# Patient Record
Sex: MALE | Race: WHITE | Employment: FULL TIME | URBAN - NONMETROPOLITAN AREA
[De-identification: names, ages, dates, MRNs, and addresses within clinical notes are randomized per-mention and may not be internally consistent; named-entity substitution may affect disease eponyms.]

---

## 2018-11-05 ENCOUNTER — HOSPITAL ENCOUNTER (OUTPATIENT)
Dept: GENERAL RADIOLOGY | Age: 58
Discharge: HOME | End: 2018-11-05
Payer: COMMERCIAL

## 2018-11-05 ENCOUNTER — HOSPITAL ENCOUNTER (OUTPATIENT)
Age: 58
Discharge: HOME | End: 2018-11-05
Payer: COMMERCIAL

## 2018-11-05 DIAGNOSIS — M79.671 RIGHT FOOT PAIN: ICD-10-CM

## 2018-11-05 PROCEDURE — 73630 X-RAY EXAM OF FOOT: CPT

## 2020-02-18 ENCOUNTER — HOSPITAL ENCOUNTER (OUTPATIENT)
Age: 60
Discharge: HOME | End: 2020-02-18
Payer: COMMERCIAL

## 2020-02-18 ENCOUNTER — NURSE ONLY (OUTPATIENT)
Dept: LAB | Age: 60
End: 2020-02-18

## 2020-02-18 ENCOUNTER — HOSPITAL ENCOUNTER (OUTPATIENT)
Dept: GENERAL RADIOLOGY | Age: 60
Discharge: HOME | End: 2020-02-18
Payer: COMMERCIAL

## 2020-02-18 ENCOUNTER — OFFICE VISIT (OUTPATIENT)
Dept: RHEUMATOLOGY | Age: 60
End: 2020-02-18
Payer: COMMERCIAL

## 2020-02-18 VITALS
OXYGEN SATURATION: 93 % | HEART RATE: 93 BPM | DIASTOLIC BLOOD PRESSURE: 72 MMHG | SYSTOLIC BLOOD PRESSURE: 130 MMHG | HEIGHT: 67 IN | WEIGHT: 205.4 LBS | BODY MASS INDEX: 32.24 KG/M2

## 2020-02-18 LAB
ALBUMIN SERPL-MCNC: 4 G/DL (ref 3.5–5.1)
ALP BLD-CCNC: 85 U/L (ref 38–126)
ALT SERPL-CCNC: 30 U/L (ref 11–66)
ANION GAP SERPL CALCULATED.3IONS-SCNC: 14 MEQ/L (ref 8–16)
AST SERPL-CCNC: 25 U/L (ref 5–40)
BASOPHILS # BLD: 0.7 %
BASOPHILS ABSOLUTE: 0.1 THOU/MM3 (ref 0–0.1)
BILIRUB SERPL-MCNC: 0.2 MG/DL (ref 0.3–1.2)
BUN BLDV-MCNC: 17 MG/DL (ref 7–22)
C-REACTIVE PROTEIN: 0.3 MG/DL (ref 0–1)
CALCIUM SERPL-MCNC: 9 MG/DL (ref 8.5–10.5)
CHLORIDE BLD-SCNC: 103 MEQ/L (ref 98–111)
CO2: 25 MEQ/L (ref 23–33)
CREAT SERPL-MCNC: 1.2 MG/DL (ref 0.4–1.2)
EOSINOPHIL # BLD: 2.9 %
EOSINOPHILS ABSOLUTE: 0.2 THOU/MM3 (ref 0–0.4)
ERYTHROCYTE [DISTWIDTH] IN BLOOD BY AUTOMATED COUNT: 12.7 % (ref 11.5–14.5)
ERYTHROCYTE [DISTWIDTH] IN BLOOD BY AUTOMATED COUNT: 42.9 FL (ref 35–45)
FERRITIN: 137 NG/ML (ref 22–322)
GFR SERPL CREATININE-BSD FRML MDRD: 62 ML/MIN/1.73M2
GLUCOSE BLD-MCNC: 132 MG/DL (ref 70–108)
HCT VFR BLD CALC: 44.3 % (ref 42–52)
HEMOGLOBIN: 14.6 GM/DL (ref 14–18)
IMMATURE GRANS (ABS): 0.03 THOU/MM3 (ref 0–0.07)
IMMATURE GRANULOCYTES: 0.4 %
IRON SATURATION: 22 % (ref 20–50)
IRON: 55 UG/DL (ref 65–195)
LYMPHOCYTES # BLD: 23.5 %
LYMPHOCYTES ABSOLUTE: 1.7 THOU/MM3 (ref 1–4.8)
MCH RBC QN AUTO: 30.5 PG (ref 26–33)
MCHC RBC AUTO-ENTMCNC: 33 GM/DL (ref 32.2–35.5)
MCV RBC AUTO: 92.5 FL (ref 80–94)
MONOCYTES # BLD: 6.9 %
MONOCYTES ABSOLUTE: 0.5 THOU/MM3 (ref 0.4–1.3)
NUCLEATED RED BLOOD CELLS: 0 /100 WBC
PLATELET # BLD: 348 THOU/MM3 (ref 130–400)
PMV BLD AUTO: 10.2 FL (ref 9.4–12.4)
POTASSIUM SERPL-SCNC: 3.8 MEQ/L (ref 3.5–5.2)
RBC # BLD: 4.79 MILL/MM3 (ref 4.7–6.1)
RHEUMATOID FACTOR: < 10 IU/ML (ref 0–13)
SEDIMENTATION RATE, ERYTHROCYTE: 5 MM/HR (ref 0–10)
SEG NEUTROPHILS: 65.6 %
SEGMENTED NEUTROPHILS ABSOLUTE COUNT: 4.7 THOU/MM3 (ref 1.8–7.7)
SODIUM BLD-SCNC: 142 MEQ/L (ref 135–145)
TOTAL CK: 341 U/L (ref 55–170)
TOTAL IRON BINDING CAPACITY: 253 UG/DL (ref 171–450)
TOTAL PROTEIN: 7 G/DL (ref 6.1–8)
URIC ACID: 6.3 MG/DL (ref 3.7–7)
WBC # BLD: 7.2 THOU/MM3 (ref 4.8–10.8)

## 2020-02-18 PROCEDURE — 73030 X-RAY EXAM OF SHOULDER: CPT

## 2020-02-18 PROCEDURE — 99244 OFF/OP CNSLTJ NEW/EST MOD 40: CPT | Performed by: INTERNAL MEDICINE

## 2020-02-18 PROCEDURE — 73130 X-RAY EXAM OF HAND: CPT

## 2020-02-18 PROCEDURE — 72100 X-RAY EXAM L-S SPINE 2/3 VWS: CPT

## 2020-02-18 RX ORDER — TAMSULOSIN HYDROCHLORIDE 0.4 MG/1
CAPSULE ORAL
COMMUNITY
Start: 2020-01-19 | End: 2021-02-03

## 2020-02-18 RX ORDER — MELOXICAM 7.5 MG/1
TABLET ORAL
COMMUNITY
Start: 2019-12-04 | End: 2020-07-01 | Stop reason: ALTCHOICE

## 2020-02-18 RX ORDER — AMLODIPINE BESYLATE 5 MG/1
TABLET ORAL EVERY EVENING
COMMUNITY
Start: 2020-02-06 | End: 2021-06-08 | Stop reason: SDUPTHER

## 2020-02-18 SDOH — HEALTH STABILITY: MENTAL HEALTH: HOW OFTEN DO YOU HAVE A DRINK CONTAINING ALCOHOL?: NEVER

## 2020-02-18 ASSESSMENT — ENCOUNTER SYMPTOMS
COUGH: 0
EYE PAIN: 0
DIARRHEA: 0
CONSTIPATION: 0
SHORTNESS OF BREATH: 1
VOMITING: 0
PHOTOPHOBIA: 0
EYE REDNESS: 0
EYES NEGATIVE: 1
WHEEZING: 1
NAUSEA: 0

## 2020-02-18 NOTE — PROGRESS NOTES
Allergen Reactions    Acetaminophen Other (See Comments)     OCCULAR HEADACHES       CURRENT MEDICATIONS      Current Outpatient Medications:     amLODIPine (NORVASC) 5 MG tablet, TAKE 1 TABLET BY MOUTH ONCE DAILY, Disp: , Rfl:     meloxicam (MOBIC) 7.5 MG tablet, TAKE 1 TO 2 TABLETS BY MOUTH ONCE DAILY WITH FOOD, Disp: , Rfl:     tamsulosin (FLOMAX) 0.4 MG capsule, TAKE 1 CAPSULE BY MOUTH ONCE DAILY, Disp: , Rfl:     lisinopril (PRINIVIL;ZESTRIL) 20 MG tablet, Take 20 mg by mouth daily, Disp: , Rfl:     PHYSICAL EXAMINATION / OBJECTIVE     Objective:  /72 (Site: Left Upper Arm, Position: Sitting, Cuff Size: Large Adult)   Pulse 93   Ht 5' 7.01\" (1.702 m)   Wt 205 lb 6.4 oz (93.2 kg)   SpO2 93%   BMI 32.16 kg/m²     Physical Exam  Vitals signs reviewed. Constitutional:       General: He is not in acute distress. Appearance: He is well-developed. He is not diaphoretic. HENT:      Head: Normocephalic and atraumatic. Right Ear: External ear normal.      Left Ear: External ear normal.   Eyes:      Conjunctiva/sclera: Conjunctivae normal.      Pupils: Pupils are equal, round, and reactive to light. Neck:      Musculoskeletal: Neck supple. Cardiovascular:      Rate and Rhythm: Normal rate and regular rhythm. Heart sounds: Normal heart sounds. Pulmonary:      Effort: Pulmonary effort is normal.      Breath sounds: Normal breath sounds. Musculoskeletal: Normal range of motion. Lymphadenopathy:      Cervical: No cervical adenopathy. Skin:     General: Skin is warm and dry. Neurological:      Mental Status: He is alert and oriented to person, place, and time. Psychiatric:         Mood and Affect: Affect normal.         Musculoskeletal:     Upper extremities:   Shoulders: Tender bilat ,  NO swelling ,  B/l scarf, hawking, gagan, left speed.     Elbows:     Tender Rt troch notch, left - lat epicondyle.  ,  NO swelling ,     Wrists        Non-tender ,  NO swelling ,  + tinel bilat. Hands:      Tender MCPS bilat, left 2nd PIP, Rt 2nd PIP. + nodules bilat. PIPs. Triggering b/l thumbs , A1 pulley noted 3rd finger & thumb bilat. Inability to make a composite fist.  + PIP enlartment. Indistinct MCPs b/l hands     Lower extremities:   Strength 5/5   ROM intact   Hips:    Tender lateral hip w/ rotation    Knees :Non-tender ,  NO swelling ,  No draw, compression, + wanda  Left   Ankles:Non-tender ,  NO swelling ,     Feet:   Tender Rt 1st IP joint  ,  NO swelling ,   Deformities: non      Spine:     C-spine: tender muscle and spinous process of neck.  Negative spurling compression ,   T-spine , L-spine, S-spine: non tender , negative jody, occiput to wall testing  , shober testing      RAPID3 Composite Score MDHAQ (0-10) + Patient pain VAS (0-10): + Patient global assessment VAS (0-10):     2/18/2020 --- RAPID 3: 5 + 7.5 + 7.5 = 17     Remission: <3  Low Disease Activity: <6  Moderate Disease Activity: >=6 and <=12  High Disease Activity: >12    LABS        CBC  Lab Results   Component Value Date    WBC 9.0 05/30/2017    RBC 4.40 05/30/2017    HGB 13.2 05/30/2017    HCT 40.6 05/30/2017    MCV 92.3 05/30/2017    MCH 30.1 05/30/2017    MCHC 32.6 05/30/2017    RDW 11.9 05/30/2017     05/30/2017       CMP  Lab Results   Component Value Date    LABALBU 3.4 05/30/2017    PROT 6.5 05/30/2017     05/30/2017    K 3.8 05/30/2017    CO2 25 05/30/2017     05/30/2017    BUN 20 05/30/2017    CREATININE 1.0 05/30/2017    ALKPHOS 60 05/30/2017    ALT 29 05/30/2017    AST 25 05/30/2017       HgBA1c: No components found for: HGBA1C    No results found for: TSHFT4, TSH  No results found for: VITD25      No results found for: ANASCRN  No results found for: SSA  No results found for: SSB  No results found for: ANTI-SMITH  No results found for: DSDNAAB   No results found for: ANTIRNP  No results found for: C3, C4  No results found for: CCPAB  No results found for: RF    No components found for: Ana M Hung  No results found for: SEDRATE  No results found for: CRP    RADIOLOGY:     Xr right foot:      FINDINGS: There is no fracture or dislocation. Joint spaces are preserved. A small osteophyte is seen at the plantar surface of the calcaneus. There is a small adjacent well corticated soft tissue calcification measuring approximately 1 mm. ASSESSMENT/PLAN    Assessment   Plan   1. Polyarthralgia   - neck back s/p inury years ago with persistent back pain and left sided radiculopathy and intermittent instability of the leg leg. Neck pain - constant, prior cervical fusions. Hand / foot pain for the past several years with progressive nodules development intermittent swelling. Father with similar hands  H/o plantar fasciitis    - evaluatino for crystalline arthritis, rheumatoid arthritis and other inflammatory conditions. VS Inflam. OA   - mobic 7.5mg bid prn.     - Comprehensive Metabolic Panel; Future  - Sedimentation Rate; Future  - CBC Auto Differential; Future  - C-Reactive Protein; Future  - Uric Acid; Future  - CK; Future  - Aldolase; Future  - Anti SSB; Future  - Rheumatoid Factor; Future  - Cyclic Citrul Peptide Antibody, IgG; Future  - MAX Screen with Reflex; Future  - XR HAND LEFT (MIN 3 VIEWS); Future  - XR HAND RIGHT (MIN 3 VIEWS); Future  - XR LUMBAR SPINE (2-3 VIEWS); Future    2. Osteoarthritis of fingers of hands, bilateral   - continue mobic 7.5mg bid prn.   - Comprehensive Metabolic Panel; Future  - Sedimentation Rate; Future  - CBC Auto Differential; Future  - C-Reactive Protein; Future  - Uric Acid; Future  - CK; Future  - Aldolase; Future  - Anti SSB; Future  - Rheumatoid Factor; Future  - Cyclic Citrul Peptide Antibody, IgG; Future  - MAX Screen with Reflex; Future  - XR HAND LEFT (MIN 3 VIEWS); Future  - XR HAND RIGHT (MIN 3 VIEWS); Future  - XR LUMBAR SPINE (2-3 VIEWS); Future    3.  Chronic bilateral low back pain with left-sided sciatica  XR lumbar spine

## 2020-02-19 ENCOUNTER — TELEPHONE (OUTPATIENT)
Dept: RHEUMATOLOGY | Age: 60
End: 2020-02-19

## 2020-02-20 LAB
ALDOLASE: 6.8 U/L (ref 1.5–8.1)
ANTI SSB: 0 AU/ML (ref 0–40)
CYCLIC CITRULLINATED PEPTIDE ANTIBODY IGG: < 1.5 U/ML

## 2020-02-21 LAB — ANA SCREEN: NORMAL

## 2020-02-24 ENCOUNTER — TELEPHONE (OUTPATIENT)
Dept: PHYSICAL MEDICINE AND REHAB | Age: 60
End: 2020-02-24

## 2020-02-25 RX ORDER — DULOXETIN HYDROCHLORIDE 30 MG/1
30 CAPSULE, DELAYED RELEASE ORAL DAILY
Qty: 30 CAPSULE | Refills: 3 | Status: SHIPPED | OUTPATIENT
Start: 2020-02-25 | End: 2020-04-09 | Stop reason: ALTCHOICE

## 2020-04-06 ENCOUNTER — TELEPHONE (OUTPATIENT)
Dept: RHEUMATOLOGY | Age: 60
End: 2020-04-06

## 2020-04-06 NOTE — TELEPHONE ENCOUNTER
Patient stated Cymbalta is not helping.  He stated if Celebrex is prescribed he will need trazodone prescribed also to help with sleeping   [address]

## 2020-04-08 ENCOUNTER — TELEPHONE (OUTPATIENT)
Dept: RHEUMATOLOGY | Age: 60
End: 2020-04-08

## 2020-04-08 NOTE — TELEPHONE ENCOUNTER
Patient called in stating that he is out of the Mobic which does help but upsets his stomach, he is taking with food. Was wondering it there is something else to try. Cymbalta is not helping. Wants to know if you could prescribe him Trazodone to help him sleep at night with the pain? He has had this before and it helps. He is only sleeping 3 to 4 hours a night. Please advise.

## 2020-04-09 RX ORDER — AMITRIPTYLINE HYDROCHLORIDE 10 MG/1
10 TABLET, FILM COATED ORAL NIGHTLY PRN
Qty: 30 TABLET | Refills: 2 | Status: SHIPPED | OUTPATIENT
Start: 2020-04-09 | End: 2020-04-23 | Stop reason: ALTCHOICE

## 2020-04-09 RX ORDER — CELECOXIB 100 MG/1
100 CAPSULE ORAL DAILY
Qty: 60 CAPSULE | Refills: 3 | Status: SHIPPED
Start: 2020-04-09 | End: 2020-04-23 | Stop reason: SINTOL

## 2020-04-23 ENCOUNTER — TELEPHONE (OUTPATIENT)
Dept: PHYSICAL MEDICINE AND REHAB | Age: 60
End: 2020-04-23

## 2020-04-23 RX ORDER — TRAZODONE HYDROCHLORIDE 50 MG/1
100 TABLET ORAL NIGHTLY
Qty: 60 TABLET | Refills: 1 | Status: SHIPPED | OUTPATIENT
Start: 2020-04-23 | End: 2021-02-03

## 2020-04-23 RX ORDER — TRAZODONE HYDROCHLORIDE 50 MG/1
50 TABLET ORAL NIGHTLY
Qty: 30 TABLET | Refills: 5 | Status: SHIPPED | OUTPATIENT
Start: 2020-04-23 | End: 2020-04-23 | Stop reason: SDUPTHER

## 2020-04-23 RX ORDER — CELECOXIB 100 MG/1
100 CAPSULE ORAL 2 TIMES DAILY
Qty: 60 CAPSULE | Refills: 3 | Status: SHIPPED | OUTPATIENT
Start: 2020-04-23 | End: 2020-09-02

## 2020-04-23 NOTE — TELEPHONE ENCOUNTER
Nose bleeds --- stop celebrex. Insomnia - stop elavil. Start trazadone 50mg qhs prn     Polyarthrlagia:    - negative CTD evaluation, normal ESR/CRP    - prior relief with prednisone    - could repeat a steroid taper and if significant relief could try a DMARD based upon x-ray revealing DJD, erosion.    - if pain medications needed pt will need to speak with PCP as pt has been seen only on one occasion in the office and opiods are not precribed to new patient at least for the few 2-3 visits. Pt called but there was no answer. A message was left on the patients voicemail asking them to call back.

## 2020-05-28 ENCOUNTER — OFFICE VISIT (OUTPATIENT)
Dept: RHEUMATOLOGY | Age: 60
End: 2020-05-28
Payer: COMMERCIAL

## 2020-05-28 VITALS
TEMPERATURE: 98.6 F | OXYGEN SATURATION: 96 % | SYSTOLIC BLOOD PRESSURE: 162 MMHG | HEART RATE: 82 BPM | WEIGHT: 200 LBS | BODY MASS INDEX: 31.39 KG/M2 | HEIGHT: 67 IN | DIASTOLIC BLOOD PRESSURE: 98 MMHG

## 2020-05-28 PROCEDURE — 99214 OFFICE O/P EST MOD 30 MIN: CPT | Performed by: INTERNAL MEDICINE

## 2020-05-28 ASSESSMENT — ENCOUNTER SYMPTOMS
WHEEZING: 1
COUGH: 0
DIARRHEA: 0
EYE REDNESS: 0
VOMITING: 0
EYE PAIN: 0
CONSTIPATION: 0
EYES NEGATIVE: 1
SHORTNESS OF BREATH: 1
PHOTOPHOBIA: 0
NAUSEA: 0

## 2020-05-28 NOTE — PROGRESS NOTES
[facility]       Date Of Service: 5/28/2020  Provider: Elvia Champion DO    Name: Jing Thomason   MRN: [id number]    Chief Complaint(s)      Chief Complaint   Patient presents with    3 Month Follow-Up     Polyarthralgia        History of Present illness (HPI)    Jing Thomason   is a(n)59 y.o. male  has a past medical history of Hypertension. Hyperlipidemia  , carpal tunnel bilat, h/o b/l plantar fasciitis referred here for the evaluation of his polyarthralgia, osteoarthritis of multiple joints       - cymbalta stopped. - elavil started - caused daytime solmnolence. - trazadone - helping with sleep. - celebrex not proding long term relief. Continued bilatearl upper and lower pain, constant fingers, toes, and lower back. Pain intermittent in the other joints. Up to 7/10 over the past week. Timing: hands/lower back -- morning. Other joints throughout the dya. Aggravating factors: cold , inactivity , bumping joints. Neck - lifting. Back bending, lifting, tying shoes. Alleviating factors: movement,  celebrex , hot shower. + AM stiffness lsating a couple hours. Dry mouth, dry nose, fatigue, weakness --left leg. Previous therapy: prednisone -  Relief, wt gain. Etodolac celebre, naproxen, ibuprofen, tramado -- sig relief. Cancer screening: up to date. Review of Systems    Review of Systems   Constitutional: Positive for fatigue. Negative for diaphoresis and fever. HENT: Negative for congestion, hearing loss and nosebleeds. Eyes: Negative. Negative for photophobia, pain and redness. Respiratory: Positive for shortness of breath and wheezing (intermittent w/ asthma -- worse wtih exertion or dust). Negative for cough. Cardiovascular: Positive for palpitations. Negative for chest pain. Gastrointestinal: Negative for constipation, diarrhea, nausea and vomiting. Endocrine: Negative for polydipsia, polyphagia and polyuria.    Genitourinary: Negative for related vs other. 6. Left shoulder pain   - - impingement testing.    -- xray shoulder --- Moderate AC joint arthritis. 7. Med monitoring    - labs q 4 weeks. With MTX start. No follow-ups on file. Electronically signed by Iesha Cannon DO on 5/28/2020 at 3:37 PM    New Prescriptions    No medications on file         The risks and benefits of my recommendations, as well as other treatment options, benefits and side effects were discussed with the patient today. Questions were answered. Thank you for allowing me to participate in the care of this patient. Please call if there are any questions.

## 2020-05-30 ENCOUNTER — HOSPITAL ENCOUNTER (OUTPATIENT)
Dept: GENERAL RADIOLOGY | Age: 60
Discharge: HOME | End: 2020-05-30
Payer: COMMERCIAL

## 2020-05-30 ENCOUNTER — HOSPITAL ENCOUNTER (OUTPATIENT)
Age: 60
Discharge: HOME | End: 2020-05-30
Payer: COMMERCIAL

## 2020-05-30 DIAGNOSIS — M15.9 OSTEOARTHRITIS OF MULTIPLE JOINTS, UNSPECIFIED OSTEOARTHRITIS TYPE: ICD-10-CM

## 2020-05-30 DIAGNOSIS — M19.90 INFLAMMATORY ARTHRITIS: ICD-10-CM

## 2020-05-30 LAB
HAV IGM SER IA-ACNC: NEGATIVE
HEPATITIS B CORE IGM ANTIBODY: NEGATIVE
HEPATITIS B SURFACE ANTIGEN: NEGATIVE
HEPATITIS C ANTIBODY: NEGATIVE

## 2020-05-30 PROCEDURE — 71046 X-RAY EXAM CHEST 2 VIEWS: CPT

## 2020-05-30 PROCEDURE — 80074 ACUTE HEPATITIS PANEL: CPT

## 2020-05-30 PROCEDURE — 36415 COLL VENOUS BLD VENIPUNCTURE: CPT

## 2020-05-30 PROCEDURE — 72202 X-RAY EXAM SI JOINTS 3/> VWS: CPT

## 2020-06-01 ENCOUNTER — TELEPHONE (OUTPATIENT)
Dept: RHEUMATOLOGY | Age: 60
End: 2020-06-01

## 2020-06-01 RX ORDER — FOLIC ACID 1 MG/1
1 TABLET ORAL DAILY
Qty: 90 TABLET | Refills: 1 | Status: SHIPPED | OUTPATIENT
Start: 2020-06-01 | End: 2020-12-16

## 2020-06-19 ENCOUNTER — HOSPITAL ENCOUNTER (OUTPATIENT)
Dept: MRI IMAGING | Age: 60
Discharge: HOME | End: 2020-06-19
Payer: COMMERCIAL

## 2020-06-19 PROCEDURE — 72148 MRI LUMBAR SPINE W/O DYE: CPT

## 2020-06-25 NOTE — TELEPHONE ENCOUNTER
Richi Proctor called requesting a refill on the following medications:  Requested Prescriptions     Pending Prescriptions Disp Refills    methotrexate (RHEUMATREX) 2.5 MG chemo tablet 24 tablet 0     Sig: Take 6 tablets by mouth once a week     Pharmacy verified: Stephanie Arechiga  ++++++ pt his having labs done 6'26 at [facility], will need this med for Gaurang evening      Date of last visit: 5/28/20  Date of next visit (if applicable): 9/69/7567

## 2020-06-26 ENCOUNTER — HOSPITAL ENCOUNTER (OUTPATIENT)
Age: 60
Discharge: HOME | End: 2020-06-26
Payer: COMMERCIAL

## 2020-06-26 DIAGNOSIS — Z51.81 MEDICATION MONITORING ENCOUNTER: ICD-10-CM

## 2020-06-26 LAB
ALBUMIN SERPL-MCNC: 3.9 G/DL (ref 3.5–5.1)
ALP BLD-CCNC: 77 U/L (ref 38–126)
ALT SERPL-CCNC: 21 U/L (ref 11–66)
ANION GAP SERPL CALCULATED.3IONS-SCNC: 8 MEQ/L (ref 8–16)
AST SERPL-CCNC: 16 U/L (ref 5–40)
BASOPHILS # BLD: 0.6 % (ref 0–3)
BILIRUB SERPL-MCNC: 0.3 MG/DL (ref 0.3–1.2)
BILIRUBIN DIRECT: < 0.2 MG/DL (ref 0–0.3)
BUN BLDV-MCNC: 18 MG/DL (ref 7–22)
C-REACTIVE PROTEIN: 0.33 MG/DL (ref 0–1)
CALCIUM SERPL-MCNC: 8.7 MG/DL (ref 8.5–10.5)
CHLORIDE BLD-SCNC: 104 MEQ/L (ref 98–111)
CHOLESTEROL, FASTING: 206 MG/DL (ref 100–199)
CO2: 26 MEQ/L (ref 23–33)
CREAT SERPL-MCNC: 0.9 MG/DL (ref 0.4–1.2)
EOSINOPHILS RELATIVE PERCENT: 3.7 % (ref 0–4)
GFR SERPL CREATININE-BSD FRML MDRD: 86 ML/MIN/1.73M2
GLUCOSE FASTING: 90 MG/DL (ref 70–108)
HCT VFR BLD CALC: 43 % (ref 42–52)
HDLC SERPL-MCNC: 35 MG/DL
HEMOGLOBIN: 14.3 GM/DL (ref 14–18)
LDL CHOLESTEROL CALCULATED: 136 MG/DL
LYMPHOCYTES # BLD: 29.7 % (ref 15–47)
MCH RBC QN AUTO: 30.1 PG (ref 27–31)
MCHC RBC AUTO-ENTMCNC: 33.2 GM/DL (ref 33–37)
MCV RBC AUTO: 90.7 FL (ref 80–94)
MONOCYTES: 9 % (ref 0–12)
PDW BLD-RTO: 13.9 % (ref 11.5–14.5)
PLATELET # BLD: 324 THOU/MM3 (ref 130–400)
PMV BLD AUTO: 7.6 FL (ref 7.4–10.4)
POTASSIUM SERPL-SCNC: 4.1 MEQ/L (ref 3.5–5.2)
PROSTATE SPECIFIC ANTIGEN: 6.19 NG/ML (ref 0–1)
RBC # BLD: 4.74 MILL/MM3 (ref 4.7–6.1)
SEDIMENTATION RATE, ERYTHROCYTE: 6 MM/HR (ref 0–10)
SEGS: 57 % (ref 43–75)
SODIUM BLD-SCNC: 138 MEQ/L (ref 135–145)
TOTAL PROTEIN: 6.6 G/DL (ref 6.1–8)
TRIGLYCERIDE, FASTING: 173 MG/DL (ref 0–199)
WBC # BLD: 5.9 THOU/MM3 (ref 4.8–10.8)

## 2020-06-26 PROCEDURE — 85025 COMPLETE CBC W/AUTO DIFF WBC: CPT

## 2020-06-26 PROCEDURE — 80053 COMPREHEN METABOLIC PANEL: CPT

## 2020-06-26 PROCEDURE — 80061 LIPID PANEL: CPT

## 2020-06-26 PROCEDURE — 85651 RBC SED RATE NONAUTOMATED: CPT

## 2020-06-26 PROCEDURE — 36415 COLL VENOUS BLD VENIPUNCTURE: CPT

## 2020-06-26 PROCEDURE — 86140 C-REACTIVE PROTEIN: CPT

## 2020-06-26 PROCEDURE — G0103 PSA SCREENING: HCPCS

## 2020-06-29 NOTE — RESULT ENCOUNTER NOTE
Liver, kidneys and blood counts were within normal limits. Prostate-specific antigen (PSA) was elevated. Please follow-up with your PCP in your urologist.    Please fax PSA results to patient's PCP as these were not ordered by me    Repeat labs in 4 weeks.  X 2

## 2020-07-01 ENCOUNTER — TELEPHONE (OUTPATIENT)
Dept: RHEUMATOLOGY | Age: 60
End: 2020-07-01

## 2020-07-01 RX ORDER — PREDNISONE 1 MG/1
5 TABLET ORAL DAILY
Qty: 30 TABLET | Refills: 1 | Status: SHIPPED | OUTPATIENT
Start: 2020-07-01 | End: 2020-09-02

## 2020-07-01 NOTE — TELEPHONE ENCOUNTER
Inflammatory arthritis. - cont. Methotrexate 15mg q wk -- some mild relief noted (started 5 weeks ago)   - start prednisone 5mg daily (bridge while waiting for methotrexate to take effect)     Side effects of prednisone including osteoporosis, avascular necrosis, weight gain, MOOD CHANGES, trouble sleeping, easy bruising cataract, adrenal insufficiency, glucose intolerance were explained in detail    he reported understanding and had no further questions.

## 2020-07-01 NOTE — TELEPHONE ENCOUNTER
----- Message from Tiffanie Rivers DO sent at 6/29/2020  8:45 AM EDT -----  Liver, kidneys and blood counts were within normal limits. Prostate-specific antigen (PSA) was elevated. Please follow-up with your PCP in your urologist.    Please fax PSA results to patient's PCP as these were not ordered by me    Repeat labs in 4 weeks.  X 2

## 2020-07-15 ENCOUNTER — PROCEDURE VISIT (OUTPATIENT)
Dept: NEUROLOGY | Age: 60
End: 2020-07-15
Payer: COMMERCIAL

## 2020-07-15 PROCEDURE — 95910 NRV CNDJ TEST 7-8 STUDIES: CPT | Performed by: PSYCHIATRY & NEUROLOGY

## 2020-07-15 PROCEDURE — 95886 MUSC TEST DONE W/N TEST COMP: CPT | Performed by: PSYCHIATRY & NEUROLOGY

## 2020-07-23 ENCOUNTER — HOSPITAL ENCOUNTER (OUTPATIENT)
Age: 60
Discharge: HOME | End: 2020-07-23
Payer: COMMERCIAL

## 2020-07-23 DIAGNOSIS — Z51.81 MEDICATION MONITORING ENCOUNTER: ICD-10-CM

## 2020-07-23 LAB
ALBUMIN SERPL-MCNC: 3.9 G/DL (ref 3.5–5.1)
ALP BLD-CCNC: 72 U/L (ref 38–126)
ALT SERPL-CCNC: 23 U/L (ref 11–66)
ANION GAP SERPL CALCULATED.3IONS-SCNC: 9 MEQ/L (ref 8–16)
AST SERPL-CCNC: 25 U/L (ref 5–40)
BASOPHILS # BLD: 0.7 %
BASOPHILS ABSOLUTE: 0 THOU/MM3 (ref 0–0.1)
BILIRUB SERPL-MCNC: 0.4 MG/DL (ref 0.3–1.2)
BUN BLDV-MCNC: 16 MG/DL (ref 7–22)
C-REACTIVE PROTEIN: 0.66 MG/DL (ref 0–1)
CALCIUM SERPL-MCNC: 8.9 MG/DL (ref 8.5–10.5)
CHLORIDE BLD-SCNC: 106 MEQ/L (ref 98–111)
CO2: 27 MEQ/L (ref 23–33)
CREAT SERPL-MCNC: 0.8 MG/DL (ref 0.4–1.2)
EOSINOPHIL # BLD: 2.7 %
EOSINOPHILS ABSOLUTE: 0.2 THOU/MM3 (ref 0–0.4)
ERYTHROCYTE [DISTWIDTH] IN BLOOD BY AUTOMATED COUNT: 13.2 % (ref 11.5–14.5)
ERYTHROCYTE [DISTWIDTH] IN BLOOD BY AUTOMATED COUNT: 47.3 FL (ref 35–45)
GFR SERPL CREATININE-BSD FRML MDRD: > 90 ML/MIN/1.73M2
GLUCOSE BLD-MCNC: 98 MG/DL (ref 70–108)
HCT VFR BLD CALC: 43.7 % (ref 42–52)
HEMOGLOBIN: 13.6 GM/DL (ref 14–18)
IMMATURE GRANS (ABS): 0.01 THOU/MM3 (ref 0–0.07)
IMMATURE GRANULOCYTES: 0.2 %
LYMPHOCYTES # BLD: 26.2 %
LYMPHOCYTES ABSOLUTE: 1.5 THOU/MM3 (ref 1–4.8)
MCH RBC QN AUTO: 30.4 PG (ref 26–33)
MCHC RBC AUTO-ENTMCNC: 31.1 GM/DL (ref 32.2–35.5)
MCV RBC AUTO: 97.8 FL (ref 80–94)
MONOCYTES # BLD: 7.8 %
MONOCYTES ABSOLUTE: 0.4 THOU/MM3 (ref 0.4–1.3)
NUCLEATED RED BLOOD CELLS: 0 /100 WBC
PLATELET # BLD: 310 THOU/MM3 (ref 130–400)
PMV BLD AUTO: 9.9 FL (ref 9.4–12.4)
POTASSIUM SERPL-SCNC: 4.3 MEQ/L (ref 3.5–5.2)
RBC # BLD: 4.47 MILL/MM3 (ref 4.7–6.1)
SEDIMENTATION RATE, ERYTHROCYTE: 6 MM/HR (ref 0–10)
SEG NEUTROPHILS: 62.4 %
SEGMENTED NEUTROPHILS ABSOLUTE COUNT: 3.5 THOU/MM3 (ref 1.8–7.7)
SODIUM BLD-SCNC: 142 MEQ/L (ref 135–145)
TOTAL PROTEIN: 6.4 G/DL (ref 6.1–8)
WBC # BLD: 5.6 THOU/MM3 (ref 4.8–10.8)

## 2020-07-23 PROCEDURE — 85025 COMPLETE CBC W/AUTO DIFF WBC: CPT

## 2020-07-23 PROCEDURE — 86140 C-REACTIVE PROTEIN: CPT

## 2020-07-23 PROCEDURE — 80053 COMPREHEN METABOLIC PANEL: CPT

## 2020-07-23 PROCEDURE — 85651 RBC SED RATE NONAUTOMATED: CPT

## 2020-07-23 PROCEDURE — 36415 COLL VENOUS BLD VENIPUNCTURE: CPT

## 2020-08-13 ENCOUNTER — HOSPITAL ENCOUNTER (OUTPATIENT)
Age: 60
Discharge: HOME | End: 2020-08-13
Payer: COMMERCIAL

## 2020-08-13 ENCOUNTER — HOSPITAL ENCOUNTER (OUTPATIENT)
Dept: GENERAL RADIOLOGY | Age: 60
Discharge: HOME | End: 2020-08-13
Payer: COMMERCIAL

## 2020-08-13 DIAGNOSIS — M25.50 POLYARTHRALGIA: ICD-10-CM

## 2020-08-13 DIAGNOSIS — Z51.81 MEDICATION MONITORING ENCOUNTER: ICD-10-CM

## 2020-08-13 LAB
ALBUMIN SERPL-MCNC: 3.9 G/DL (ref 3.5–5.1)
ALP BLD-CCNC: 79 U/L (ref 38–126)
ALT SERPL-CCNC: 35 U/L (ref 11–66)
ANION GAP SERPL CALCULATED.3IONS-SCNC: 10 MEQ/L (ref 8–16)
APTT: 31.9 SECONDS (ref 22–38)
AST SERPL-CCNC: 24 U/L (ref 5–40)
BASOPHILS # BLD: 0.7 %
BASOPHILS ABSOLUTE: 0 THOU/MM3 (ref 0–0.1)
BILIRUB SERPL-MCNC: 0.2 MG/DL (ref 0.3–1.2)
BUN BLDV-MCNC: 16 MG/DL (ref 7–22)
C-REACTIVE PROTEIN: 0.43 MG/DL (ref 0–1)
CALCIUM SERPL-MCNC: 8.9 MG/DL (ref 8.5–10.5)
CHLORIDE BLD-SCNC: 104 MEQ/L (ref 98–111)
CO2: 26 MEQ/L (ref 23–33)
CREAT SERPL-MCNC: 0.9 MG/DL (ref 0.4–1.2)
EKG ATRIAL RATE: 62 BPM
EKG P AXIS: 62 DEGREES
EKG P-R INTERVAL: 136 MS
EKG Q-T INTERVAL: 408 MS
EKG QRS DURATION: 84 MS
EKG QTC CALCULATION (BAZETT): 414 MS
EKG R AXIS: 74 DEGREES
EKG T AXIS: 52 DEGREES
EKG VENTRICULAR RATE: 62 BPM
EOSINOPHIL # BLD: 3.4 %
EOSINOPHILS ABSOLUTE: 0.2 THOU/MM3 (ref 0–0.4)
ERYTHROCYTE [DISTWIDTH] IN BLOOD BY AUTOMATED COUNT: 13.3 % (ref 11.5–14.5)
ERYTHROCYTE [DISTWIDTH] IN BLOOD BY AUTOMATED COUNT: 47.6 FL (ref 35–45)
GFR SERPL CREATININE-BSD FRML MDRD: 86 ML/MIN/1.73M2
GLUCOSE BLD-MCNC: 95 MG/DL (ref 70–108)
HCT VFR BLD CALC: 43.5 % (ref 42–52)
HEMOGLOBIN: 13.8 GM/DL (ref 14–18)
IMMATURE GRANS (ABS): 0.01 THOU/MM3 (ref 0–0.07)
IMMATURE GRANULOCYTES: 0.1 %
INR BLD: 0.99 (ref 0.85–1.13)
LYMPHOCYTES # BLD: 26.8 %
LYMPHOCYTES ABSOLUTE: 1.8 THOU/MM3 (ref 1–4.8)
MCH RBC QN AUTO: 30.8 PG (ref 26–33)
MCHC RBC AUTO-ENTMCNC: 31.7 GM/DL (ref 32.2–35.5)
MCV RBC AUTO: 97.1 FL (ref 80–94)
MONOCYTES # BLD: 8.7 %
MONOCYTES ABSOLUTE: 0.6 THOU/MM3 (ref 0.4–1.3)
NUCLEATED RED BLOOD CELLS: 0 /100 WBC
PLATELET # BLD: 338 THOU/MM3 (ref 130–400)
PMV BLD AUTO: 9.8 FL (ref 9.4–12.4)
POTASSIUM SERPL-SCNC: 3.8 MEQ/L (ref 3.5–5.2)
RBC # BLD: 4.48 MILL/MM3 (ref 4.7–6.1)
SEDIMENTATION RATE, ERYTHROCYTE: 6 MM/HR (ref 0–10)
SEG NEUTROPHILS: 60.3 %
SEGMENTED NEUTROPHILS ABSOLUTE COUNT: 4 THOU/MM3 (ref 1.8–7.7)
SODIUM BLD-SCNC: 140 MEQ/L (ref 135–145)
TOTAL PROTEIN: 6.3 G/DL (ref 6.1–8)
WBC # BLD: 6.7 THOU/MM3 (ref 4.8–10.8)

## 2020-08-13 PROCEDURE — 93010 ELECTROCARDIOGRAM REPORT: CPT | Performed by: INTERNAL MEDICINE

## 2020-08-13 PROCEDURE — 86140 C-REACTIVE PROTEIN: CPT

## 2020-08-13 PROCEDURE — 71046 X-RAY EXAM CHEST 2 VIEWS: CPT

## 2020-08-13 PROCEDURE — 85651 RBC SED RATE NONAUTOMATED: CPT

## 2020-08-13 PROCEDURE — 36415 COLL VENOUS BLD VENIPUNCTURE: CPT

## 2020-08-13 PROCEDURE — 80053 COMPREHEN METABOLIC PANEL: CPT

## 2020-08-13 PROCEDURE — 85025 COMPLETE CBC W/AUTO DIFF WBC: CPT

## 2020-08-13 PROCEDURE — 93005 ELECTROCARDIOGRAM TRACING: CPT | Performed by: FAMILY MEDICINE

## 2020-08-13 PROCEDURE — 85610 PROTHROMBIN TIME: CPT

## 2020-08-13 PROCEDURE — 85730 THROMBOPLASTIN TIME PARTIAL: CPT

## 2020-08-14 ENCOUNTER — TELEPHONE (OUTPATIENT)
Dept: RHEUMATOLOGY | Age: 60
End: 2020-08-14

## 2020-08-17 LAB
BILIRUBIN URINE: NORMAL
BLOOD, URINE: NEGATIVE
CLARITY: NORMAL
COLOR: NORMAL
GLUCOSE URINE: NEGATIVE
KETONES, URINE: NEGATIVE
LEUKOCYTE ESTERASE, URINE: NEGATIVE
NITRITE, URINE: NEGATIVE
PH UA: 7 (ref 4.5–8)
PROTEIN UA: NEGATIVE
SPECIFIC GRAVITY UA: 1.02 (ref 1–1.03)
UROBILINOGEN, URINE: NORMAL

## 2020-09-02 ENCOUNTER — OFFICE VISIT (OUTPATIENT)
Dept: UROLOGY | Age: 60
End: 2020-09-02
Payer: COMMERCIAL

## 2020-09-02 ENCOUNTER — TELEPHONE (OUTPATIENT)
Dept: UROLOGY | Age: 60
End: 2020-09-02

## 2020-09-02 VITALS — WEIGHT: 185 LBS | BODY MASS INDEX: 29.03 KG/M2 | HEIGHT: 67 IN | TEMPERATURE: 97.7 F

## 2020-09-02 LAB
BILIRUBIN URINE: NEGATIVE
BLOOD URINE, POC: NEGATIVE
CHARACTER, URINE: CLEAR
COLOR, URINE: YELLOW
GLUCOSE URINE: NEGATIVE MG/DL
KETONES, URINE: NEGATIVE
LEUKOCYTE CLUMPS, URINE: NEGATIVE
NITRITE, URINE: NEGATIVE
PH, URINE: 5.5 (ref 5–9)
POST VOID RESIDUAL (PVR): 37 ML
PROTEIN, URINE: NEGATIVE MG/DL
SPECIFIC GRAVITY, URINE: 1.02 (ref 1–1.03)
UROBILINOGEN, URINE: 0.2 EU/DL (ref 0–1)

## 2020-09-02 PROCEDURE — 99204 OFFICE O/P NEW MOD 45 MIN: CPT | Performed by: UROLOGY

## 2020-09-02 PROCEDURE — 51798 US URINE CAPACITY MEASURE: CPT | Performed by: UROLOGY

## 2020-09-02 PROCEDURE — 81003 URINALYSIS AUTO W/O SCOPE: CPT | Performed by: UROLOGY

## 2020-09-02 RX ORDER — HYDROCODONE BITARTRATE AND ACETAMINOPHEN 5; 325 MG/1; MG/1
TABLET ORAL
COMMUNITY
Start: 2020-08-25 | End: 2020-09-16

## 2020-09-02 NOTE — PROGRESS NOTES
Dr. Ashley Contreras MD  [facility])  Urology Clinic  New Patient Visit      Patient:  Bisi Garcia  YOB: 1960  Date: 9/2/2020    HISTORY OF PRESENT ILLNESS:   The patient is a 62 y.o. male who presents today for evaluation of the following problem(s): Elevated PSA. No family history of prostate cancer. Severe LUTS from BPH. Worsening. 1 year. No hematuria. Dribbling of urine. Very slow stream.   Overall the problem(s) : are worsening. Associated Symptoms: No dysuria, gross hematuria. Pain Severity: 0/10    Summary of old records:   None    Imaging/Labs reviewed during today's visit:  I have independently reviewed and verified the following films during today's visit.   PSA    Last several PSA's:  Lab Results   Component Value Date    PSA 6.19 (H) 06/26/2020       Last total testosterone:  No results found for: TESTOSTERONE    Urinalysis today:  Results for POC orders placed in visit on 09/02/20   POCT Urinalysis No Micro (Auto)   Result Value Ref Range    Glucose, Ur Negative NEGATIVE mg/dl    Bilirubin Urine Negative     Ketones, Urine Negative NEGATIVE    Specific Gravity, Urine 1.020 1.002 - 1.030    Blood, UA POC Negative NEGATIVE    pH, Urine 5.50 5.0 - 9.0    Protein, Urine Negative NEGATIVE mg/dl    Urobilinogen, Urine 0.20 0.0 - 1.0 eu/dl    Nitrite, Urine Negative NEGATIVE    Leukocyte Clumps, Urine Negative NEGATIVE    Color, Urine Yellow YELLOW-STRAW    Character, Urine Clear CLR-SL.CLOUD   poct post void residual   Result Value Ref Range    post void residual 37 ml         Last BUN and creatinine:  Lab Results   Component Value Date    BUN 16 08/13/2020     Lab Results   Component Value Date    CREATININE 0.9 08/13/2020       Imaging Reviewed during this Office Visit:   (results were independently reviewed by physician and radiology report verified)    PAST MEDICAL, FAMILY AND SOCIAL HISTORY:  Past Medical History:   Diagnosis Date    Asthma     Hypertension     Stomach ulcer normal.  Skin: Normal  Lungs: Respiratory effort normal  Cardiovascular:  Normal peripheral pulses  Abdomen: Soft, non-tender, non-distended with no CVA, flank pain, hepatosplenomegaly or hernia. Kidneys normal.  Bladder non-tender and not distended. Lymphatics: no palpable lymphadenopathy       Assessment and Plan      1. Lower urinary tract symptoms (LUTS)    2. Elevated PSA           Plan:      No follow-ups on file. Severe LUTS resistant to medical management with flomax. Schedule for cystoscopy and TRUS prostate biopsy.          Dr. Rudolph Schwarz MD

## 2020-09-02 NOTE — TELEPHONE ENCOUNTER
DO NOT TAKE ASPIRIN, IBUPROFEN, MOTRIN-LIKE DRUGS AND ANY MULTIVITAMINS OR OVER THE COUNTER SUPPLEMENTS 5 DAYS PRIOR TO SURGERY AND 3 DAYS FOLLOWING     Richi Proctor 1960 Diagnosis: Lower tract urologic symptoms/ Elevated PSA    Surgical Physician: Dr. Skyla Huang have been scheduled for the procedure marked below:      Surgery: Cystoscopy, transrectal ultrasound guided prostate biopsy           Date: 09-     Anesthesia: Anesthesiologist (General/Spinal)     Place of Service: [address] [place] [address] Second Floor Same Day Surgery       Arrive to same day surgery by:  11:30am  (Surgery time is subject to change)      INSTRUCTIONS AS MARKED BELOW:    1. DO NOT eat or drink anything after midnight before surgery. 2. We prefer you shower or bathe with an antibacterial soap (Dial) the morning of surgery. 3.  Please ensure to have a  with you to transport you home. 4.  Please bring a current medication list, photo ID and insurance card(s) with you  5. Okay to take Tylenol  6. If you take Glucophage, Metformin or Janumet, hold 48-hours prior to surgery  7. Take blood pressure or heart medication as directed, if taken in the morning take with a small sip of water  8. You should receive a follow up appointment upon discharge from the hospital.  If you do not the office will call in 1-2 days to schedule. On 09- have your Covid-19 screening and urinalysis done.     (Covid-19 screening is date sensitive and can only be done 5 to 7 days prior to your procedure)    Date: 9/2/2020

## 2020-09-09 NOTE — TELEPHONE ENCOUNTER
Patient scheduled for surgery with Dr. Jesus Chapa on 09- at 1:30pm with an arrival of 11:30am.  Preop orders and instructions given to patient. Surgery consent signed.

## 2020-09-09 NOTE — TELEPHONE ENCOUNTER
SURGERY [address]  [address] [place] [address] [address] [place], [address]      Phone *[phone] *[phone]   Surgical Scheduling Direct Line Phone *[phone] Fax *[phone]      Jyoti Mack 1960 male    [address] [facility]   Marital Status:          Home Phone: [phone]      Cell Phone:    Telephone Information:   Mobile [phone]          Surgeon: Dr. Rick Martins  Surgery Date: 09-   Time: Demetrius Costa    Procedure: Cystoscopy and transrectal ultrasound guided prostate biopsy    Diagnosis: Elevated PSA     Important Medical History: In [facility]    Special Inst/Equip: MATTHEW Saba at Ultrasound notified     CPT Codes:    66972, 60022  Latex Allergy:  No     Cardiac Device:  No     Anesthesia:  MAC          Admission Type:  Same Day                             Admit Prior to Day of Surgery: No    Case Location:  Main OR           Preadmission Testing:Phone Call              PAT Date and Time:______________________________________________________    PAT Confirmation #: ______________________________________________________    Post Op Visit: ___________________________________________________________    Need Preop Cardiac Clearance: No    Does Patient have Cardiologist/physician?      None    Surgery Confirmation #: __________________________________________________    : ________________________   Date: __________________________     [employer] Name: Jerry Sheth

## 2020-09-16 RX ORDER — TRAMADOL HYDROCHLORIDE 50 MG/1
50 TABLET ORAL EVERY 6 HOURS PRN
COMMUNITY
End: 2021-02-03

## 2020-09-16 NOTE — PROGRESS NOTES
Following instructions given to patient, who states understanding:     NPO after midnight  [employer] and 's license  Wear comfortable clean clothing  Do not bring jewelry   Shower night before and morning of surgery with a liquid antibacterial soap  Bring medications in original bottles  Follow all instructions given by your physician   needed at discharge  Call [phone] for any questions  Report to [facility] on 2nd floor  If you would become ill prior to surgery, please call the surgeon  May have only 1 visitor accompany you for surgery  Please bring and wear mask  You will be receiving a phone call one or two days before surgery to review covid screening exposure    Patient states he will have covid test on 9/17 at [facility] oropharyngeal exudate. Neck: Neck supple. No thyromegaly present. Cardiovascular: Normal rate, regular rhythm and normal heart sounds. Exam reveals no gallop and no friction rub. No murmur heard. Pulmonary/Chest: Effort normal and breath sounds normal. No respiratory distress. She has no wheezes. She has no rales. Abdominal: Soft. She exhibits no distension. There is no tenderness. There is no rebound and no guarding. Musculoskeletal: She exhibits no edema. Neurological: She is alert. Oriented to person and place only   Skin: She is not diaphoretic. Vitals:    09/18/19 1158   BP: (!) 140/80   Pulse:    SpO2:          ASSESSMENT/PLAN:  1. Memory loss  Restart aricept. Encouraged placement in assisted living. Order home  Health services  - donepezil (ARICEPT) 5 MG tablet; Take 1 tablet by mouth nightly  Dispense: 90 tablet; Refill: 1    2. Screen for colon cancer  - Cologuard (For External Results Only); Future          Vaccine Information Sheet, \"Influenza - Inactivated\"  given to Santi Dangelo, or parent/legal guardian of  Santi Dangelo and verbalized understanding. Patient responses:    Have you ever had a reaction to a flu vaccine? No  Are you able to eat eggs without adverse effects? Yes  Do you have any current illness? No  Have you ever had Guillian Houston Syndrome? No    Flu vaccine given per order. Please see immunization tab.

## 2020-09-16 NOTE — PROGRESS NOTES
In preparation for their surgical procedure above patient was screened for Obstructive Sleep Apnea (ARIES) using the STOP-Bang Questionnaire by the Pre-Admission Testing department. This is a pre-surgical screening tool for patient safety and serves as a recommendation, this WILL NOT cause cancellation of surgery. STOP-Bang Questionnaire  * Do you currently see a pulmonologist?  No     If yes STOP, do not complete. Patient follows with  .    1. Do you snore loudly (able to be heard in the next room)? No    2. Do you often feel tired or sleepy during the daytime? No       3. Has anyone ever told you that you stop breathing during your sleep? No    4. Do you have or are you being treated for high blood pressure? Yes      5. BMI more than 35? BMI (Calculated): 29        No    6. Age over 48 years? 62 y.o. Yes    7. Neck Circumference greater than 17 inches for male or 16 inches for female? Measured           (visits only)            Not Applicable    8. Gender Male? Yes      TOTAL SCORE: 3    ARIES - Low Risk : Yes to 0 - 2 questions  ARIES - Intermediate Risk : Yes to 3 - 4 questions  ARIES - High Risk : Yes to 5 - 8 questions    Adapted from:   STOP Questionnaire: A Tool to Screen Patients for Obstructive Sleep Apnea   ALLIE Saldaña.P.C., Pito Powers M.B.B.S., Barbara Machado M.D., Kayla Ortiz. Jennifer Olson, Ph.D., JAGDISH Mejia.B.B.S., Renata Simpson, M.Sc., Lilia Carvajal M.D., Nevin Back. [place], F.R.C.P.C.    Anesthesiology 2008; [phone] Copyright 2008, the [employer] of Anesthesiologists, [facility] 37.   ----------------------------------------------------------------------------------------------------------------

## 2020-09-17 ENCOUNTER — HOSPITAL ENCOUNTER (OUTPATIENT)
Age: 60
Discharge: HOME | End: 2020-09-17
Payer: COMMERCIAL

## 2020-09-17 DIAGNOSIS — R97.20 ELEVATED PSA: ICD-10-CM

## 2020-09-17 DIAGNOSIS — R39.9 LOWER URINARY TRACT SYMPTOMS (LUTS): ICD-10-CM

## 2020-09-17 LAB
ALBUMIN SERPL-MCNC: 4.2 G/DL (ref 3.5–5.1)
ALP BLD-CCNC: 71 U/L (ref 38–126)
ALT SERPL-CCNC: 27 U/L (ref 11–66)
ANION GAP SERPL CALCULATED.3IONS-SCNC: 10 MEQ/L (ref 8–16)
AST SERPL-CCNC: 17 U/L (ref 5–40)
BASOPHILS # BLD: 0.3 %
BASOPHILS ABSOLUTE: 0 THOU/MM3 (ref 0–0.1)
BILIRUB SERPL-MCNC: 0.3 MG/DL (ref 0.3–1.2)
BILIRUBIN URINE: NEGATIVE
BLOOD, URINE: NEGATIVE
BUN BLDV-MCNC: 18 MG/DL (ref 7–22)
C-REACTIVE PROTEIN: 0.91 MG/DL (ref 0–1)
CALCIUM SERPL-MCNC: 9 MG/DL (ref 8.5–10.5)
CHARACTER, URINE: CLEAR
CHLORIDE BLD-SCNC: 104 MEQ/L (ref 98–111)
CO2: 26 MEQ/L (ref 23–33)
COLOR: YELLOW
CREAT SERPL-MCNC: 0.9 MG/DL (ref 0.4–1.2)
EOSINOPHIL # BLD: 1.9 %
EOSINOPHILS ABSOLUTE: 0.1 THOU/MM3 (ref 0–0.4)
ERYTHROCYTE [DISTWIDTH] IN BLOOD BY AUTOMATED COUNT: 13.9 % (ref 11.5–14.5)
ERYTHROCYTE [DISTWIDTH] IN BLOOD BY AUTOMATED COUNT: 50.5 FL (ref 35–45)
GFR SERPL CREATININE-BSD FRML MDRD: 86 ML/MIN/1.73M2
GLUCOSE BLD-MCNC: 100 MG/DL (ref 70–108)
GLUCOSE, URINE: NEGATIVE MG/DL
HCT VFR BLD CALC: 42.3 % (ref 42–52)
HEMOGLOBIN: 13.3 GM/DL (ref 14–18)
IMMATURE GRANS (ABS): 0.03 THOU/MM3 (ref 0–0.07)
IMMATURE GRANULOCYTES: 0.4 %
KETONES, URINE: NEGATIVE
LEUKOCYTE ESTERASE, URINE: NEGATIVE
LYMPHOCYTES # BLD: 25.5 %
LYMPHOCYTES ABSOLUTE: 1.9 THOU/MM3 (ref 1–4.8)
MCH RBC QN AUTO: 31.5 PG (ref 26–33)
MCHC RBC AUTO-ENTMCNC: 31.4 GM/DL (ref 32.2–35.5)
MCV RBC AUTO: 100.2 FL (ref 80–94)
MONOCYTES # BLD: 7.8 %
MONOCYTES ABSOLUTE: 0.6 THOU/MM3 (ref 0.4–1.3)
NITRITE, URINE: NEGATIVE
NUCLEATED RED BLOOD CELLS: 0 /100 WBC
PH UA: 5.5 (ref 5–9)
PLATELET # BLD: 342 THOU/MM3 (ref 130–400)
PMV BLD AUTO: 9.6 FL (ref 9.4–12.4)
POTASSIUM SERPL-SCNC: 4.6 MEQ/L (ref 3.5–5.2)
PROTEIN UA: NEGATIVE MG/DL
RBC # BLD: 4.22 MILL/MM3 (ref 4.7–6.1)
REFLEX TO URINE C & S: NORMAL
SEDIMENTATION RATE, ERYTHROCYTE: 8 MM/HR (ref 0–10)
SEG NEUTROPHILS: 64.1 %
SEGMENTED NEUTROPHILS ABSOLUTE COUNT: 4.7 THOU/MM3 (ref 1.8–7.7)
SODIUM BLD-SCNC: 140 MEQ/L (ref 135–145)
SPECIFIC GRAVITY UA: 1.02 (ref 1–1.03)
TOTAL PROTEIN: 6.7 G/DL (ref 6.1–8)
UROBILINOGEN, URINE: 0.2 EU/DL (ref 0–1)
WBC # BLD: 7.4 THOU/MM3 (ref 4.8–10.8)

## 2020-09-17 PROCEDURE — 36415 COLL VENOUS BLD VENIPUNCTURE: CPT

## 2020-09-17 PROCEDURE — 86140 C-REACTIVE PROTEIN: CPT

## 2020-09-17 PROCEDURE — 85651 RBC SED RATE NONAUTOMATED: CPT

## 2020-09-17 PROCEDURE — 81001 URINALYSIS AUTO W/SCOPE: CPT

## 2020-09-17 PROCEDURE — 80053 COMPREHEN METABOLIC PANEL: CPT

## 2020-09-17 PROCEDURE — U0003 INFECTIOUS AGENT DETECTION BY NUCLEIC ACID (DNA OR RNA); SEVERE ACUTE RESPIRATORY SYNDROME CORONAVIRUS 2 (SARS-COV-2) (CORONAVIRUS DISEASE [COVID-19]), AMPLIFIED PROBE TECHNIQUE, MAKING USE OF HIGH THROUGHPUT TECHNOLOGIES AS DESCRIBED BY CMS-2020-01-R: HCPCS

## 2020-09-17 PROCEDURE — 85025 COMPLETE CBC W/AUTO DIFF WBC: CPT

## 2020-09-18 ENCOUNTER — PREP FOR PROCEDURE (OUTPATIENT)
Dept: UROLOGY | Age: 60
End: 2020-09-18

## 2020-09-18 RX ORDER — SODIUM CHLORIDE 9 MG/ML
INJECTION, SOLUTION INTRAVENOUS CONTINUOUS
Status: CANCELLED | OUTPATIENT
Start: 2020-09-23

## 2020-09-19 LAB — SARS-COV-2: NOT DETECTED

## 2020-09-21 ENCOUNTER — TELEPHONE (OUTPATIENT)
Dept: UROLOGY | Age: 60
End: 2020-09-21

## 2020-09-23 ENCOUNTER — ANESTHESIA EVENT (OUTPATIENT)
Dept: OPERATING ROOM | Age: 60
End: 2020-09-23
Payer: COMMERCIAL

## 2020-09-23 ENCOUNTER — HOSPITAL ENCOUNTER (OUTPATIENT)
Age: 60
Setting detail: OUTPATIENT SURGERY
Discharge: HOME | End: 2020-09-23
Attending: UROLOGY | Admitting: UROLOGY
Payer: COMMERCIAL

## 2020-09-23 ENCOUNTER — ANESTHESIA (OUTPATIENT)
Dept: OPERATING ROOM | Age: 60
End: 2020-09-23
Payer: COMMERCIAL

## 2020-09-23 ENCOUNTER — HOSPITAL ENCOUNTER (OUTPATIENT)
Dept: ULTRASOUND IMAGING | Age: 60
Discharge: HOME | End: 2020-09-23
Payer: COMMERCIAL

## 2020-09-23 VITALS
HEART RATE: 67 BPM | HEIGHT: 67 IN | WEIGHT: 187.4 LBS | SYSTOLIC BLOOD PRESSURE: 115 MMHG | TEMPERATURE: 98.3 F | OXYGEN SATURATION: 98 % | DIASTOLIC BLOOD PRESSURE: 76 MMHG | BODY MASS INDEX: 29.41 KG/M2 | RESPIRATION RATE: 18 BRPM

## 2020-09-23 VITALS
SYSTOLIC BLOOD PRESSURE: 85 MMHG | OXYGEN SATURATION: 100 % | RESPIRATION RATE: 22 BRPM | TEMPERATURE: 96.8 F | DIASTOLIC BLOOD PRESSURE: 51 MMHG

## 2020-09-23 PROCEDURE — 3700000001 HC ADD 15 MINUTES (ANESTHESIA): Performed by: UROLOGY

## 2020-09-23 PROCEDURE — 7100000010 HC PHASE II RECOVERY - FIRST 15 MIN: Performed by: UROLOGY

## 2020-09-23 PROCEDURE — 7100000001 HC PACU RECOVERY - ADDTL 15 MIN: Performed by: UROLOGY

## 2020-09-23 PROCEDURE — 6360000002 HC RX W HCPCS

## 2020-09-23 PROCEDURE — 2580000003 HC RX 258

## 2020-09-23 PROCEDURE — 3700000000 HC ANESTHESIA ATTENDED CARE: Performed by: UROLOGY

## 2020-09-23 PROCEDURE — 3600000013 HC SURGERY LEVEL 3 ADDTL 15MIN: Performed by: UROLOGY

## 2020-09-23 PROCEDURE — 7100000000 HC PACU RECOVERY - FIRST 15 MIN: Performed by: UROLOGY

## 2020-09-23 PROCEDURE — 3600000003 HC SURGERY LEVEL 3 BASE: Performed by: UROLOGY

## 2020-09-23 PROCEDURE — 76998 US GUIDE INTRAOP: CPT

## 2020-09-23 PROCEDURE — 7100000011 HC PHASE II RECOVERY - ADDTL 15 MIN: Performed by: UROLOGY

## 2020-09-23 PROCEDURE — 2500000003 HC RX 250 WO HCPCS: Performed by: NURSE ANESTHETIST, CERTIFIED REGISTERED

## 2020-09-23 PROCEDURE — 6360000002 HC RX W HCPCS: Performed by: NURSE ANESTHETIST, CERTIFIED REGISTERED

## 2020-09-23 PROCEDURE — 2709999900 HC NON-CHARGEABLE SUPPLY: Performed by: UROLOGY

## 2020-09-23 PROCEDURE — 88305 TISSUE EXAM BY PATHOLOGIST: CPT

## 2020-09-23 RX ORDER — LIDOCAINE HYDROCHLORIDE 20 MG/ML
INJECTION, SOLUTION INFILTRATION; PERINEURAL PRN
Status: DISCONTINUED | OUTPATIENT
Start: 2020-09-23 | End: 2020-09-23 | Stop reason: SDUPTHER

## 2020-09-23 RX ORDER — PROPOFOL 10 MG/ML
INJECTION, EMULSION INTRAVENOUS PRN
Status: DISCONTINUED | OUTPATIENT
Start: 2020-09-23 | End: 2020-09-23 | Stop reason: SDUPTHER

## 2020-09-23 RX ORDER — GENTAMICIN SULFATE 40 MG/ML
INJECTION, SOLUTION INTRAMUSCULAR; INTRAVENOUS PRN
Status: DISCONTINUED | OUTPATIENT
Start: 2020-09-23 | End: 2020-09-23 | Stop reason: SDUPTHER

## 2020-09-23 RX ORDER — SODIUM CHLORIDE 9 MG/ML
INJECTION, SOLUTION INTRAVENOUS CONTINUOUS
Status: DISCONTINUED | OUTPATIENT
Start: 2020-09-23 | End: 2020-09-23 | Stop reason: HOSPADM

## 2020-09-23 RX ORDER — CIPROFLOXACIN 500 MG/1
500 TABLET, FILM COATED ORAL 2 TIMES DAILY
Qty: 6 TABLET | Refills: 0 | Status: SHIPPED | OUTPATIENT
Start: 2020-09-23 | End: 2020-09-26

## 2020-09-23 RX ADMIN — PROPOFOL 20 MG: 10 INJECTION, EMULSION INTRAVENOUS at 13:33

## 2020-09-23 RX ADMIN — PROPOFOL 20 MG: 10 INJECTION, EMULSION INTRAVENOUS at 13:34

## 2020-09-23 RX ADMIN — PROPOFOL 20 MG: 10 INJECTION, EMULSION INTRAVENOUS at 13:36

## 2020-09-23 RX ADMIN — LIDOCAINE HYDROCHLORIDE 80 MG: 20 INJECTION, SOLUTION INFILTRATION; PERINEURAL at 13:32

## 2020-09-23 RX ADMIN — SODIUM CHLORIDE: 9 INJECTION, SOLUTION INTRAVENOUS at 12:24

## 2020-09-23 RX ADMIN — SODIUM CHLORIDE: 9 INJECTION, SOLUTION INTRAVENOUS at 12:21

## 2020-09-23 RX ADMIN — GENTAMICIN SULFATE 240 MG: 40 INJECTION, SOLUTION INTRAMUSCULAR; INTRAVENOUS at 13:40

## 2020-09-23 RX ADMIN — PROPOFOL 20 MG: 10 INJECTION, EMULSION INTRAVENOUS at 13:32

## 2020-09-23 RX ADMIN — CEFAZOLIN 2 G: 10 INJECTION, POWDER, FOR SOLUTION INTRAVENOUS at 13:32

## 2020-09-23 ASSESSMENT — PULMONARY FUNCTION TESTS
PIF_VALUE: 0
PIF_VALUE: 1
PIF_VALUE: 0

## 2020-09-23 ASSESSMENT — PAIN SCALES - GENERAL
PAINLEVEL_OUTOF10: 3
PAINLEVEL_OUTOF10: 3
PAINLEVEL_OUTOF10: 4
PAINLEVEL_OUTOF10: 5

## 2020-09-23 ASSESSMENT — PAIN - FUNCTIONAL ASSESSMENT: PAIN_FUNCTIONAL_ASSESSMENT: 0-10

## 2020-09-23 NOTE — ANESTHESIA POSTPROCEDURE EVALUATION
Department of Anesthesiology  Postprocedure Note    Patient: Karan Burch  MRN: [id number]  YOB: 1960  Date of evaluation: 9/23/2020  Time:  3:07 PM     Procedure Summary     Date:  09/23/20 Room / Location:  [address]    Anesthesia Start:  1322 Anesthesia Stop:  1359    Procedure:  CYSTOSCOPY AND TRANSRECTAL ULTRASOUND GUIDED PROSTATE BIOPSY (N/A ) Diagnosis:  (ELEVATED PSA)    Surgeon:  Luis Alvares MD Responsible Provider:  Jimi Oleary MD    Anesthesia Type:  MAC ASA Status:  3          Anesthesia Type: MAC    Juvenal Phase I: Juvenal Score: 10    Juvenal Phase II:      Last vitals: Reviewed and per EMR flowsheets.        Anesthesia Post Evaluation    Patient location during evaluation: PACU  Patient participation: complete - patient participated  Level of consciousness: awake  Airway patency: patent  Nausea & Vomiting: no vomiting and no nausea  Complications: no  Cardiovascular status: hemodynamically stable  Respiratory status: acceptable  Hydration status: stable

## 2020-09-23 NOTE — OP NOTE
Dr. Shital Adkins MD  Urologic Surgery      [address] [place] [address]. [place]  09/23/20    Patient:  Checo Ovalle  MRN: [id number]  YOB: 1960    Surgeon: Dr. Shital Adkins MD  Assistant: None    Pre-op Diagnosis: Elevated PSA and Severe BPH  Post-op Diagnosis: Same. Procedure:   1. Trans-Rectal Ultrasound Guided Biopsy of the Prostate  2. Cystoscopy . Findings:        --Gland Size: 68mL       --Prostate Lesions: None    Anesthesia: Monitor Anesthesia Care  Complications: None  OR Blood Loss: Minimal  Fluids: Cystalloids  Specimens:  ID Type Source Tests Collected by Time Destination   A : RIGHT LATERAL BASE- RLB Tissue Prostate SURGICAL PATHOLOGY Shital Adkins MD 9/23/2020 1315    B : LEFT APEX - LA Tissue Prostate SURGICAL PATHOLOGY Shital Adkins MD 9/23/2020 1315    C : RIGHT BASE- RB Tissue Prostate SURGICAL PATHOLOGY Shital Adkins MD 9/23/2020 1315    D : RIGHT LATERAL MID- RLM Tissue Prostate SURGICAL PATHOLOGY Shital Adkins MD 9/23/2020 1315    E : RIGHT MID - RM Tissue Prostate SURGICAL PATHOLOGY Shital Adkins MD 9/23/2020 1315    F : RIGHT LATERAL APE - RLA Tissue Prostate SURGICAL PATHOLOGY Shital Adkins MD 9/23/2020 1315    G : RIGHT APEX - RA Tissue Prostate SURGICAL PATHOLOGY Shital Adkins MD 9/23/2020 1315    H : LEFT LATERAL BASE - LLB Tissue Prostate SURGICAL PATHOLOGY Shital Adkins MD 9/23/2020 1315    I : LEFT MEDIAL BASE - LB Tissue Prostate SURGICAL PATHOLOGY Shital Adkins MD 9/23/2020 1315    J : LEFT LATERAL MID - LLM Tissue Prostate SURGICAL PATHOLOGY Shital Adkins MD 9/23/2020 1315    K : LEFT MID - LM Tissue Prostate SURGICAL PATHOLOGY Shital Adkins MD 9/23/2020 1315    L : LEFT LATERAL APEX - LLA Tissue Prostate SURGICAL PATHOLOGY Shital Adkins MD 9/23/2020 1315        Indication:  Patient is a pleasant 60-year-old male with elevated PSA. He also was noted to have severe lower urinary tract symptoms.   Because this we recommended both a prostate biopsy and cystoscopy at the same time. After risks and benefits were explained he elected to proceed with today's procedure. Narrative of the Procedure:    After informed consent was obtain, the patient was taken to the operative suite. He remained on the [facility]. He was sterilely prepped and draped in a standard fashion after anesthesia was induced. A timeout occurred in which 2 patient identifiers were used. At this time I entered the urethra with a flexible cystoscope. Urethra was normal.  No stricture or stenosis was seen. Within the prostate he was noted to have severe bilateral BPH. I then entered the bladder and did a cystoscopy. He had no mucosal lesions. Mild trabeculation. No cellule or diverticular formation. Retroflexion demonstrated a moderate sized median lobe with a ball valving. The scope was then removed. He was rolled onto the side. The legs were brought toward the chest. The rectal ultrasound probe was inserted and volumetric measurements were taken. The prostate volume was 68 grams. The prostate was assessed in its entirety and no hypoechoic or otherwise abnormal lesions were noted. Starting at the base of the gland and worked apically, sampling was completed. A standard sextant template was employed bilaterally. A total of 1 core was taken within each quadrant for a total of 12 biopsies. Once completed, the rectal ultrasound probe was removed. Inspection of the rectum demonstrated no active bleeding. The patient was rolled back into the supine position. He was then discharged back to the PACU in good and stable condition. Follow-Up: The patient will be discharged home today. Once resulted, the pathology report will be discussed with the patient.     Luis Alvares  Electronically signed on 9/23/2020 at 2:35 PM

## 2020-09-23 NOTE — PROGRESS NOTES
1349 Pt transferred to PACU,  See flowsheet for assessment. 1415 Pt unable to void in urinal and would like to get up and use the bathroom. Pt denies SOB/CP. Tele: NSR.   1420 Pt transferred to [facility], report given to Giovany Robles, visitor already in room.

## 2020-09-23 NOTE — PROGRESS NOTES
Pt returned to [facility] room 15. Vitals and assessment as charted. 0.9 infusing, @550ml to count from PACU. Pt has crackers and water. Family at the bedside. Pt and family verbalized understanding of discharge criteria and call light use. Call light in reach.

## 2020-09-23 NOTE — H&P
difficult to intubate when he had cervical sx    Hypertension     Stomach ulcer      Past Surgical History:   Procedure Laterality Date    [place]     CARPAL TUNNEL RELEASE Left 2020    CERVICAL DISC SURGERY      fusion in 2005    NECK SURGERY  2006    fusion C3 C4 C5     Family History   Problem Relation Age of Onset    Hypertension Mother     Esophageal Cancer Father         METS TO LIVER CA    Cancer Father         esophageal    Arthritis Son      Outpatient Medications Marked as Taking for the 20 encounter [facility] Encounter)   Medication Sig Dispense Refill    Celecoxib (CELEBREX PO) Take by mouth Dose unknown   q tab daily  Has not taken in the past month      methotrexate (RHEUMATREX) 2.5 MG chemo tablet Take 6 tablets by mouth once a week 24 tablet 1    traMADol (ULTRAM) 50 MG tablet Take 50 mg by mouth every 6 hours as needed for Pain.       [DISCONTINUED] methotrexate (RHEUMATREX) 2.5 MG chemo tablet Take 6 tablets by mouth once a week (Patient taking differently: Take 15 mg by mouth once a week sundays) 24 tablet 0    folic acid (FOLVITE) 1 MG tablet Take 1 tablet by mouth daily 90 tablet 1    traZODone (DESYREL) 50 MG tablet Take 2 tablets by mouth nightly (Patient taking differently: Take 50 mg by mouth nightly ) 60 tablet 1    amLODIPine (NORVASC) 5 MG tablet every evening       tamsulosin (FLOMAX) 0.4 MG capsule TAKE 1 CAPSULE BY MOUTH ONCE DAILY      lisinopril (PRINIVIL;ZESTRIL) 20 MG tablet Take 20 mg by mouth daily         Acetaminophen and Codeine  Social History     Tobacco Use   Smoking Status Former Smoker    Years: 15.00    Last attempt to quit: 2008    Years since quittin.6   Smokeless Tobacco Never Used       Social History     Substance and Sexual Activity   Alcohol Use Never    Frequency: Never       REVIEW OF SYSTEMS:  Constitutional: negative  Eyes: negative  Respiratory: negative  Cardiovascular: negative  Gastrointestinal: negative  Musculoskeletal: negative  Genitourinary: negative except for what is in HPI  Skin: negative   Neurological: negative  Hematological/Lymphatic: negative  Psychological: negative    Physical Exam:    This a 62 y.o. male   Vitals:    09/23/20 1150   BP: (!) 156/100   Pulse: 80   Resp: 12   Temp: 99.1 °F (37.3 °C)   SpO2: 95%     Constitutional: Patient in no acute distress; Neuro: alert and oriented to person place and time. Psych: Mood and affect normal.  Skin: Normal  Lungs: Respiratory effort normal  Cardiovascular:  Normal peripheral pulses  Abdomen: Soft, non-tender, non-distended with no CVA, flank pain, hepatosplenomegaly or hernia. Kidneys normal.  Bladder non-tender and not distended. Lymphatics: no palpable lymphadenopathy       Assessment and Plan         Elevated PSA. Severe BPH with LUTS. Plan:      No follow-ups on file. Severe LUTS resistant to medical management with flomax. Cystoscopy and TRUS prostate biopsy.          Dr. Ro Veliz MD

## 2020-09-23 NOTE — PROGRESS NOTES
ADMITTED TO [facility] AND ORIENTED TO UNIT. SCDS ON. FALL AND ALLERGY BANDS ON. PT VERBALIZED APPROVAL FOR FIRST NAME, LAST INITIAL AND PHYSICIAN NAME ON UNIT WHITEBOARD. Wife, Isaiah oMon with the patient.

## 2020-09-23 NOTE — ANESTHESIA PRE PROCEDURE
Department of Anesthesiology  Preprocedure Note       Name:  Gabriela Cantu   Age:  62 y.o.  :  1960                                          MRN:  [id number]         Date:  2020      Surgeon: Blane Estrada):  Amelie Oliver MD    Procedure: Procedure(s):  CYSTOSCOPY AND TRANSRECTAL ULTRASOUND GUIDED PROSTATE BIOPSY    Medications prior to admission:   Prior to Admission medications    Medication Sig Start Date End Date Taking? Authorizing Provider   Albuterol (PROVENTIL IN) Inhale into the lungs as needed   Yes Historical Provider, MD   traMADol (ULTRAM) 50 MG tablet Take 50 mg by mouth every 6 hours as needed for Pain. Yes Historical Provider, MD   folic acid (FOLVITE) 1 MG tablet Take 1 tablet by mouth daily 20  Yes Rosibel Cheatham DO   traZODone (DESYREL) 50 MG tablet Take 2 tablets by mouth nightly  Patient taking differently: Take 50 mg by mouth nightly  20 Yes Rosibel Cheatham DO   amLODIPine (NORVASC) 5 MG tablet every evening  20  Yes Historical Provider, MD   tamsulosin (FLOMAX) 0.4 MG capsule TAKE 1 CAPSULE BY MOUTH ONCE DAILY 20  Yes Historical Provider, MD   lisinopril (PRINIVIL;ZESTRIL) 20 MG tablet Take 20 mg by mouth daily   Yes Historical Provider, MD   methotrexate (RHEUMATREX) 2.5 MG chemo tablet Take 6 tablets by mouth once a week 20   Fuentes Quiroga DO       Current medications:    Current Facility-Administered Medications   Medication Dose Route Frequency Provider Last Rate Last Dose    0.9 % sodium chloride infusion   Intravenous Continuous Kelley Manuel        ceFAZolin (ANCEF) 2 g in dextrose 5 % 50 mL IVPB  2 g Intravenous 30 Min Pre-Op Kelley Manuel           Allergies: Allergies   Allergen Reactions    Acetaminophen Other (See Comments)     OCCULAR HEADACHES    Codeine      Causes headaches       Problem List:  There is no problem list on file for this patient.       Past Medical History:        Diagnosis Date    Asthma     Difficult intubation     in 2020 difficult to intubate when he had cervical sx    Hypertension     Stomach ulcer        Past Surgical History:        Procedure Laterality Date    [place]     CARPAL TUNNEL RELEASE Left 2020    CERVICAL DISC SURGERY      fusion in    [address] [place] [address]  2006    fusion C3 C4 C5       Social History:    Social History     Tobacco Use    Smoking status: Former Smoker     Years: 15.00     Last attempt to quit: 2008     Years since quittin.6    Smokeless tobacco: Never Used   Substance Use Topics    Alcohol use: Never     Frequency: Never                                Counseling given: Not Answered      Vital Signs (Current):   Vitals:    20 1411 20 1150   BP:  (!) 156/100   Pulse:  80   Resp:  12   Temp:  99.1 °F (37.3 °C)   TempSrc:  Temporal   SpO2:  95%   Weight: 185 lb (83.9 kg) 187 lb 6.4 oz (85 kg)   Height: 5' 7\" (1.702 m) 5' 7\" (1.702 m)                                              BP Readings from Last 3 Encounters:   20 (!) 156/100   20 (!) 162/98   20 130/72       NPO Status:                                                                                 BMI:   Wt Readings from Last 3 Encounters:   20 187 lb 6.4 oz (85 kg)   20 185 lb (83.9 kg)   20 200 lb (90.7 kg)     Body mass index is 29.35 kg/m².     CBC:   Lab Results   Component Value Date    WBC 7.4 2020    RBC 4.22 2020    HGB 13.3 2020    HCT 42.3 2020    .2 2020    RDW 13.9 2020     2020       CMP:   Lab Results   Component Value Date     2020    K 4.6 2020     2020    CO2 26 2020    BUN 18 2020    CREATININE 0.9 2020    LABGLOM 86 2020    GLUCOSE 100 2020    PROT 6.7 2020    CALCIUM 9.0 2020    BILITOT 0.3 2020    ALKPHOS 71 2020    AST 17 2020    ALT 27 2020       POC

## 2020-09-28 ENCOUNTER — TELEPHONE (OUTPATIENT)
Dept: UROLOGY | Age: 60
End: 2020-09-28

## 2020-09-29 ENCOUNTER — OFFICE VISIT (OUTPATIENT)
Dept: RHEUMATOLOGY | Age: 60
End: 2020-09-29
Payer: COMMERCIAL

## 2020-09-29 VITALS
BODY MASS INDEX: 29.79 KG/M2 | DIASTOLIC BLOOD PRESSURE: 58 MMHG | OXYGEN SATURATION: 96 % | TEMPERATURE: 97.8 F | WEIGHT: 190.2 LBS | HEART RATE: 83 BPM | SYSTOLIC BLOOD PRESSURE: 130 MMHG

## 2020-09-29 PROCEDURE — 99214 OFFICE O/P EST MOD 30 MIN: CPT | Performed by: INTERNAL MEDICINE

## 2020-09-29 ASSESSMENT — ENCOUNTER SYMPTOMS
EYES NEGATIVE: 1
WHEEZING: 0
DIARRHEA: 1
ABDOMINAL PAIN: 1
SHORTNESS OF BREATH: 0
VOMITING: 0
BLOOD IN STOOL: 0
COUGH: 0
EYE PAIN: 0
NAUSEA: 0
EYE REDNESS: 0
CONSTIPATION: 0

## 2020-09-29 NOTE — PROGRESS NOTES
[facility] RHEUMATOLOGY FOLLOW UP NOTE       Date Of Service: 9/29/2020  Provider: Sandra Ceja DO    Name: Manisha Law   MRN: [id number]    Chief Complaint(s)     Chief Complaint   Patient presents with    Follow-up     4 month inflammatory arthritis        History of Present Illness (HPI)     Manisha Law  is a(n)60 y.o. male with a hx of  Hypertension. Hyperlipidemia  , carpal tunnel bilat, h/o b/l plantar fasciitis referred here for the evaluation of his polyarthralgia, osteoarthritis of multiple joints         S/p lumbar spine surgery --- with improvement of the bilateral sciatica     worsening pain while off celebrex for back and prostate surgery. Ongoing pain in the finger, toes, neck, back, and left shoulder. Pain up to 4-5/10 over the past ewek. Timing: hands nad lower back - morning. Aggravating factors: cold , inactivity , bumping joints. Weather changes. Alleviating factors: movement,  celebrex , hot shower  Reported swelling of toes and hands. AM stiffness lasting a couple hours   Denies weakness,     Dry mouth, dry nose, fatigue, weakness --left leg.       Previous therapy: prednisone -  Relief, wt gain. Etodolac celebre, naproxen, ibuprofen, tramado -- sig relief. REVIEW OF SYSTEMS: (ROS)    Review of Systems   Constitutional: Negative for diaphoresis, fatigue and fever. HENT: Negative for congestion, hearing loss and nosebleeds. Eyes: Negative. Negative for pain and redness. Respiratory: Negative for cough, shortness of breath and wheezing. Cardiovascular: Negative. Negative for chest pain. Gastrointestinal: Positive for abdominal pain (intermittent) and diarrhea (intermittent). Negative for blood in stool, constipation, nausea and vomiting. Genitourinary: Negative for difficulty urinating, frequency and hematuria. Musculoskeletal: Positive for arthralgias. Negative for myalgias. Skin: Negative for rash.    Neurological: Negative for dizziness, mouth nightly (Patient taking differently: Take 50 mg by mouth nightly ), Disp: 60 tablet, Rfl: 1    PHYSICAL EXAMINATION / OBJECTIVE     Objective:  BP (!) 130/58 (Site: Left Upper Arm, Position: Sitting, Cuff Size: Large Adult)   Pulse 83   Temp 97.8 °F (36.6 °C)   Wt 190 lb 3.2 oz (86.3 kg)   SpO2 96%   BMI 29.79 kg/m²     General Appearance: General appearance:  AAO x 3 ,  well-developed and well nourished  Head: normocephalic and atraumatic  Eyes: No gross abnormalities. ,  Sclera non-icteric  Ears / nose:  normal external appearance of left and right ear and nose. No active drainage from nose. mouth:  MMM, ears w/o deformities  Neck: No jugular venous distention, appears symmetric, good ROM    Pulmonary/Chest: CTA bilateral ,  symmetric chest expansion. Cardiovascular: Normal S1 and S2, NO murmur, rub, gallop  : Deferred   Abd/GI: Deferred   Neurologic:  speech normal,   no facial drooping, no asterixis  Skin:  No rash on exposed extremities. Musculoskeletal:  Upper extremities:  Hands:      Tender MCPS bilat, left 2nd PIP, Rt 2nd PIP. + nodules bilat. PIPs. Triggering b/l thumbs , A1 pulley noted 3rd finger & thumb bilat. Inability to make a composite fist.  + PIP enlartment. Indistinct Right 2-3 MCPs. Lower extremities:  Tender left 2nd MTP. Exam KEY:   Tender :  T    Swelling: S,   Deformities: D,   Non-tender : NT  ,  No swelling: NS        LABS      CBC  Lab Results   Component Value Date    WBC 7.4 09/17/2020    WBC 6.7 08/13/2020    WBC 5.6 07/23/2020    RBC 4.22 09/17/2020    HGB 13.3 09/17/2020    HGB 13.8 08/13/2020    HGB 13.6 07/23/2020    HCT 42.3 09/17/2020    .2 09/17/2020    RDW 13.9 06/26/2020     09/17/2020     08/13/2020     07/23/2020       CMP  Lab Results   Component Value Date    CALCIUM 9.0 09/17/2020    LABALBU 4.2 09/17/2020    PROT 6.7 09/17/2020     09/17/2020    K 4.6 09/17/2020    CO2 26 09/17/2020     09/17/2020 BUN 18 09/17/2020    CREATININE 0.9 09/17/2020    CREATININE 0.9 08/13/2020    CREATININE 0.8 07/23/2020    ALKPHOS 71 09/17/2020    ALT 27 09/17/2020    ALT 35 08/13/2020    ALT 23 07/23/2020    AST 17 09/17/2020    AST 24 08/13/2020    AST 25 07/23/2020       HgBA1c: No components found for: HGBA1C    No results found for: VITD25    Lab Results   Component Value Date    ANASCRN None Detected 02/18/2020     No results found for: SSA  Lab Results   Component Value Date    SSB 0 02/18/2020     No results found for: ANTI-SMITH  No results found for: DSDNAAB   No results found for: ANTIRNP  No results found for: C3, C4  No results found for: CCPAB  Lab Results   Component Value Date    RF < 10 02/18/2020       No components found for: CANCASCRN, APANCASCRN  Lab Results   Component Value Date    SEDRATE 8 09/17/2020     Lab Results   Component Value Date    CRP 0.91 09/17/2020         RADIOLOGY / PROCEDURES:         ASSESSMENT/PLAN    Assessment   Plan     No diagnosis found. 1. Polyarthralgia --- ? Seronegative inflammatory arthritis. - neck back s/p inury years ago with persistent back pain and left sided radiculopathy and intermittent instability of the leg leg. Neck pain - constant, prior cervical fusions. Hand / foot pain for the past several years with progressive nodules development intermittent swelling. Father with similar hands  H/o plantar fasciitis                   - prior relief with prednisone. - cont celebrex 200mg daily                - Methotrexate 15 mg po  weekly   + folic acid-  (may 7562)                     2. Osteoarthritis of fingers of hands, bilateral               - celebrex 100mg bid.      3. Chronic bilateral low back pain with left-sided sciatica  4. Moderate severe spinal stenosis  -- s/p lumbar spine surgery Aug 2020. XR lumbar spine with scoliosis, DJD and facet arthritis.   ? SI joint change on right per my read.        5. Bilateral carpal tunnel syndrome  -- intermittent - right. tinel bilat wrist and elbows, right left CTS release.                - request [facility] records     6. Med monitoring:    q 12 weeks. No follow-ups on file. Electronically signed by Jony Daly DO on 9/29/2020 at 3:45 PM    New Prescriptions    No medications on file       Thank you for allowing me to participate in the care of this patient. Please call if there are any questions.

## 2020-09-30 ENCOUNTER — OFFICE VISIT (OUTPATIENT)
Dept: UROLOGY | Age: 60
End: 2020-09-30
Payer: COMMERCIAL

## 2020-09-30 ENCOUNTER — TELEPHONE (OUTPATIENT)
Dept: UROLOGY | Age: 60
End: 2020-09-30

## 2020-09-30 VITALS — BODY MASS INDEX: 29.82 KG/M2 | HEIGHT: 67 IN | WEIGHT: 190 LBS | TEMPERATURE: 98.1 F

## 2020-09-30 PROCEDURE — 99214 OFFICE O/P EST MOD 30 MIN: CPT | Performed by: UROLOGY

## 2020-09-30 NOTE — PROGRESS NOTES
Dr. Glendy Cobb MD  [facility])  Urology Clinic      Patient:  Jyoti Mack  YOB: 1960  Date: 9/30/2020    HISTORY OF PRESENT ILLNESS:   The patient is a 62 y.o. male who presents today for evaluation of the following problem(s): Elevated PSA. No family history of prostate cancer. Severe LUTS from BPH. 1 year. No hematuria. Dribbling of urine. Very slow stream. New today is [place] prostate cancer. We discussed all options with him today. Prostate was 68g on biopsy. Cysto showed severe BPH with median lobe. Overall the problem(s) : are worsening. Associated Symptoms: No dysuria, gross hematuria. Pain Severity: 0/10    Summary of old records:   None    Imaging/Labs reviewed during today's visit:  I have independently reviewed and verified the following films during today's visit. PSA    Last several PSA's:  Lab Results   Component Value Date    PSA 6.19 (H) 06/26/2020       Last total testosterone:  No results found for: TESTOSTERONE    Urinalysis today:  No results found for this visit on 09/30/20.       Last BUN and creatinine:  Lab Results   Component Value Date    BUN 18 09/17/2020     Lab Results   Component Value Date    CREATININE 0.9 09/17/2020       Imaging Reviewed during this Office Visit:   (results were independently reviewed by physician and radiology report verified)    PAST MEDICAL, FAMILY AND SOCIAL HISTORY:  Past Medical History:   Diagnosis Date    Asthma     Difficult intubation     in 8/25/2020 difficult to intubate when he had cervical sx    Hypertension     Stomach ulcer      Past Surgical History:   Procedure Laterality Date    [place]    [place]     CARPAL TUNNEL RELEASE Left 03/05/2020    CERVICAL [address] SURGERY      fusion in 2005   [address] [address]  2006    fusion C3 C4 C5    ULTRASOUND PROSTATE/TRANSRECTAL N/A 9/23/2020    CYSTOSCOPY AND TRANSRECTAL ULTRASOUND GUIDED PROSTATE BIOPSY performed by Glendy Cobb MD at [facility] History   Problem Relation Age of Onset    Hypertension Mother     Esophageal Cancer Father         METS TO LIVER CA    Cancer Father         esophageal    Arthritis Son      Outpatient Medications Marked as Taking for the 20 encounter (Office Visit) with Ashley Contreras MD   Medication Sig Dispense Refill    Celecoxib (CELEBREX PO) Take by mouth Dose unknown   q tab daily  Has not taken in the past month      methotrexate (RHEUMATREX) 2.5 MG chemo tablet Take 6 tablets by mouth once a week 24 tablet 1    Albuterol (PROVENTIL IN) Inhale into the lungs as needed      traMADol (ULTRAM) 50 MG tablet Take 50 mg by mouth every 6 hours as needed for Pain.  folic acid (FOLVITE) 1 MG tablet Take 1 tablet by mouth daily 90 tablet 1    amLODIPine (NORVASC) 5 MG tablet every evening       tamsulosin (FLOMAX) 0.4 MG capsule TAKE 1 CAPSULE BY MOUTH ONCE DAILY      lisinopril (PRINIVIL;ZESTRIL) 20 MG tablet Take 20 mg by mouth daily         Acetaminophen and Codeine  Social History     Tobacco Use   Smoking Status Former Smoker    Years: 15.00    Last attempt to quit: 2008    Years since quittin.6   Smokeless Tobacco Never Used       Social History     Substance and Sexual Activity   Alcohol Use Never    Frequency: Never       REVIEW OF SYSTEMS:  Constitutional: negative  Eyes: negative  Respiratory: negative  Cardiovascular: negative  Gastrointestinal: negative  Musculoskeletal: negative  Genitourinary: negative except for what is in HPI  Skin: negative   Neurological: negative  Hematological/Lymphatic: negative  Psychological: negative    Physical Exam:    This a 62 y.o. male   Vitals:    20 0937   Temp: 98.1 °F (36.7 °C)     Constitutional: Patient in no acute distress; Neuro: alert and oriented to person place and time.     Psych: Mood and affect normal.  Skin: Normal  Lungs: Respiratory effort normal  Cardiovascular:  Normal peripheral pulses  Abdomen: Soft, non-tender, non-distended with no CVA, flank pain, hepatosplenomegaly or hernia. Kidneys normal.  Bladder non-tender and not distended. Lymphatics: no palpable lymphadenopathy       Assessment and Plan      1. Elevated PSA    2. Lower urinary tract symptoms (LUTS)    3. Prostate cancer ([facility] [address]           Plan:      No follow-ups on file. Severe LUTS resistant to medical management with flomax. Cysto showed severe BPH with median lobe. Will schedule for TURP. Will send tissue for oncotype Dx.           Dr. Lorena Martinez MD

## 2020-09-30 NOTE — TELEPHONE ENCOUNTER
DO NOT TAKE ASPIRIN, CELEBREX, IBUPROFEN, MOTRIN-LIKE DRUGS AND ANY MULTIVITAMINS OR OVER THE COUNTER SUPPLEMENTS 5 DAYS PRIOR TO SURGERY AND 3 DAYS FOLLOWING     Lori Proctor 1960 Diagnosis: BPH     Surgical Physician: Dr. Elle García have been scheduled for the procedure marked below:      Surgery: Transurethral resection of prostate           Date: 12-    Anesthesia: Anesthesiologist (General/Spinal)     Place of Service: [facility] Second Floor Same Day Surgery           Arrive to same day surgery by:  9:30am  (Surgery time is subject to change)      INSTRUCTIONS AS MARKED BELOW:    1. DO NOT eat or drink anything after midnight before surgery. 2. We prefer you shower or bathe with an antibacterial soap (Dial) the morning of surgery. 3.  Please ensure to have a  with you to transport you home. 4.  Please bring a current medication list, photo ID and insurance card(s) with you  5. Okay to take Tylenol  6. If you take Glucophage, Metformin or Janumet, hold 48-hours prior to surgery  7. Take blood pressure or heart medication as directed, if taken in the morning take with a small sip of water  8. You should receive a follow up appointment upon discharge from the hospital.  If you do not the office will call in 1-2 days to schedule.     On 12- have your Covid-19 screening and urinalysis    (Covid-19 screening is date sensitive and can only be done 5 to 7 days prior to your procedure)    Date: 9/30/2020

## 2020-10-19 ENCOUNTER — TELEPHONE (OUTPATIENT)
Dept: UROLOGY | Age: 60
End: 2020-10-19

## 2020-10-26 NOTE — TELEPHONE ENCOUNTER
Called Oncotype DX spoke to Fernando and she stated that they faxed the results on 10/12/20. She verified the fax # and they had it down as [phone]. I told Fernando that it is [phone]. She stated that they will refax it.

## 2020-11-18 ENCOUNTER — HOSPITAL ENCOUNTER (OUTPATIENT)
Age: 60
Discharge: HOME | End: 2020-11-18
Payer: COMMERCIAL

## 2020-11-18 DIAGNOSIS — Z51.81 MEDICATION MONITORING ENCOUNTER: ICD-10-CM

## 2020-11-18 LAB
ALBUMIN SERPL-MCNC: 3.7 G/DL (ref 3.5–5.1)
ALP BLD-CCNC: 79 U/L (ref 38–126)
ALT SERPL-CCNC: 27 U/L (ref 11–66)
ANION GAP SERPL CALCULATED.3IONS-SCNC: 7 MEQ/L (ref 8–16)
AST SERPL-CCNC: 20 U/L (ref 5–40)
BASOPHILS # BLD: 0.7 %
BASOPHILS ABSOLUTE: 0 THOU/MM3 (ref 0–0.1)
BILIRUB SERPL-MCNC: < 0.2 MG/DL (ref 0.3–1.2)
BUN BLDV-MCNC: 19 MG/DL (ref 7–22)
C-REACTIVE PROTEIN: 0.53 MG/DL (ref 0–1)
CALCIUM SERPL-MCNC: 9 MG/DL (ref 8.5–10.5)
CHLORIDE BLD-SCNC: 105 MEQ/L (ref 98–111)
CO2: 27 MEQ/L (ref 23–33)
CREAT SERPL-MCNC: 0.9 MG/DL (ref 0.4–1.2)
EOSINOPHIL # BLD: 3 %
EOSINOPHILS ABSOLUTE: 0.2 THOU/MM3 (ref 0–0.4)
ERYTHROCYTE [DISTWIDTH] IN BLOOD BY AUTOMATED COUNT: 12.9 % (ref 11.5–14.5)
ERYTHROCYTE [DISTWIDTH] IN BLOOD BY AUTOMATED COUNT: 45.8 FL (ref 35–45)
GFR SERPL CREATININE-BSD FRML MDRD: 86 ML/MIN/1.73M2
GLUCOSE BLD-MCNC: 105 MG/DL (ref 70–108)
HCT VFR BLD CALC: 43.2 % (ref 42–52)
HEMOGLOBIN: 13.9 GM/DL (ref 14–18)
IMMATURE GRANS (ABS): 0.02 THOU/MM3 (ref 0–0.07)
IMMATURE GRANULOCYTES: 0.3 %
LYMPHOCYTES # BLD: 21.7 %
LYMPHOCYTES ABSOLUTE: 1.3 THOU/MM3 (ref 1–4.8)
MCH RBC QN AUTO: 31.7 PG (ref 26–33)
MCHC RBC AUTO-ENTMCNC: 32.2 GM/DL (ref 32.2–35.5)
MCV RBC AUTO: 98.6 FL (ref 80–94)
MONOCYTES # BLD: 10.9 %
MONOCYTES ABSOLUTE: 0.7 THOU/MM3 (ref 0.4–1.3)
NUCLEATED RED BLOOD CELLS: 0 /100 WBC
PLATELET # BLD: 302 THOU/MM3 (ref 130–400)
PMV BLD AUTO: 10.5 FL (ref 9.4–12.4)
POTASSIUM SERPL-SCNC: 4.1 MEQ/L (ref 3.5–5.2)
RBC # BLD: 4.38 MILL/MM3 (ref 4.7–6.1)
SEDIMENTATION RATE, ERYTHROCYTE: 6 MM/HR (ref 0–10)
SEG NEUTROPHILS: 63.4 %
SEGMENTED NEUTROPHILS ABSOLUTE COUNT: 3.8 THOU/MM3 (ref 1.8–7.7)
SODIUM BLD-SCNC: 139 MEQ/L (ref 135–145)
TOTAL PROTEIN: 6 G/DL (ref 6.1–8)
WBC # BLD: 6 THOU/MM3 (ref 4.8–10.8)

## 2020-11-18 PROCEDURE — 86140 C-REACTIVE PROTEIN: CPT

## 2020-11-18 PROCEDURE — 36415 COLL VENOUS BLD VENIPUNCTURE: CPT

## 2020-11-18 PROCEDURE — 85025 COMPLETE CBC W/AUTO DIFF WBC: CPT

## 2020-11-18 PROCEDURE — 80053 COMPREHEN METABOLIC PANEL: CPT

## 2020-11-18 PROCEDURE — 85651 RBC SED RATE NONAUTOMATED: CPT

## 2020-11-18 NOTE — PROGRESS NOTES
Diagnosis Orders   1. Inflammatory arthritis  methotrexate (RHEUMATREX) 2.5 MG chemo tablet     2. Med monitoring - repeat labs in 12 weeks.

## 2020-11-19 ENCOUNTER — TELEPHONE (OUTPATIENT)
Dept: RHEUMATOLOGY | Age: 60
End: 2020-11-19

## 2020-11-19 NOTE — TELEPHONE ENCOUNTER
----- Message from Luz Arguelles DO sent at 11/18/2020  5:56 PM EST -----  No significant lab abnormalities. Continue current medications. Repeat labs in 12 weeks.

## 2020-11-19 NOTE — TELEPHONE ENCOUNTER
Spoke to pt with results. States understanding. States that pain in the hands is still severe and wanting any suggestions.

## 2020-11-19 NOTE — TELEPHONE ENCOUNTER
Please asked the patient if she would like to have follow-up appointment next Monday at 10:40 AM for the evaluation of ongoing pain in the hands.

## 2020-11-20 NOTE — TELEPHONE ENCOUNTER
Phoned and offered appt. He accepted but unsure if he will be home Monday. He will call and cancel if he is not home.

## 2020-11-23 ENCOUNTER — OFFICE VISIT (OUTPATIENT)
Dept: RHEUMATOLOGY | Age: 60
End: 2020-11-23
Payer: COMMERCIAL

## 2020-11-23 VITALS
TEMPERATURE: 97.9 F | SYSTOLIC BLOOD PRESSURE: 140 MMHG | HEART RATE: 81 BPM | OXYGEN SATURATION: 97 % | BODY MASS INDEX: 29.75 KG/M2 | HEIGHT: 67 IN | DIASTOLIC BLOOD PRESSURE: 78 MMHG

## 2020-11-23 PROCEDURE — 99214 OFFICE O/P EST MOD 30 MIN: CPT | Performed by: INTERNAL MEDICINE

## 2020-11-23 RX ORDER — PREDNISONE 1 MG/1
5 TABLET ORAL DAILY
Qty: 30 TABLET | Refills: 1 | Status: SHIPPED | OUTPATIENT
Start: 2020-11-23 | End: 2021-02-03

## 2020-11-23 RX ORDER — LEFLUNOMIDE 10 MG/1
10 TABLET ORAL DAILY
Qty: 30 TABLET | Refills: 0 | Status: SHIPPED | OUTPATIENT
Start: 2020-11-23 | End: 2021-02-03

## 2020-11-23 ASSESSMENT — ENCOUNTER SYMPTOMS
EYE PAIN: 0
EYE REDNESS: 0
EYES NEGATIVE: 1
DIARRHEA: 1
ABDOMINAL PAIN: 1
CONSTIPATION: 0
COUGH: 0
SHORTNESS OF BREATH: 0
BLOOD IN STOOL: 0
VOMITING: 0
WHEEZING: 0
NAUSEA: 0

## 2020-11-23 NOTE — PROGRESS NOTES
Genitourinary: Negative for difficulty urinating, frequency and hematuria. Musculoskeletal: Positive for arthralgias, joint swelling and neck pain. Negative for myalgias. Skin: Negative for rash. Neurological: Negative for dizziness, weakness and headaches. Hematological: Does not bruise/bleed easily. Psychiatric/Behavioral: Negative for sleep disturbance. The patient is not nervous/anxious. PAST MEDICAL HISTORY     has a past medical history of Asthma, Difficult intubation, Hypertension, and Stomach ulcer. PAST SURGICAL HISTORY     has a past surgical history that includes Neck surgery (2006); Carpal tunnel release (Left, 03/05/2020); back surgery (08/2020); Cervical disc surgery; and Ultrasound Prostate/Transrectal (N/A, 9/23/2020). Aline Bonilla FAMILY HISTORY      Family History   Problem Relation Age of Onset    Hypertension Mother     Esophageal Cancer Father         METS TO LIVER CA    Cancer Father         esophageal    Arthritis Son        SOCIAL HISTORY     reports that he quit smoking about 12 years ago. He quit after 15.00 years of use. He has never used smokeless tobacco. He reports that he does not drink alcohol or use drugs.     ALLERGIES     Allergies   Allergen Reactions    Acetaminophen Other (See Comments)     OCCULAR HEADACHES    Codeine      Causes headaches       CURRENT MEDICATIONS      Current Outpatient Medications:     methotrexate (RHEUMATREX) 2.5 MG chemo tablet, Take 6 tablets by mouth once a week, Disp: 24 tablet, Rfl: 2    celecoxib (CELEBREX) 100 MG capsule, Take 1 capsule by mouth twice daily, Disp: 60 capsule, Rfl: 2    Albuterol (PROVENTIL IN), Inhale into the lungs as needed, Disp: , Rfl:     traMADol (ULTRAM) 50 MG tablet, Take 50 mg by mouth every 6 hours as needed for Pain., Disp: , Rfl:     folic acid (FOLVITE) 1 MG tablet, Take 1 tablet by mouth daily, Disp: 90 tablet, Rfl: 1    amLODIPine (NORVASC) 5 MG tablet, every evening , Disp: , Rfl:    tamsulosin (FLOMAX) 0.4 MG capsule, TAKE 1 CAPSULE BY MOUTH ONCE DAILY, Disp: , Rfl:     lisinopril (PRINIVIL;ZESTRIL) 20 MG tablet, Take 20 mg by mouth daily, Disp: , Rfl:     traZODone (DESYREL) 50 MG tablet, Take 2 tablets by mouth nightly (Patient taking differently: Take 50 mg by mouth nightly ), Disp: 60 tablet, Rfl: 1    PHYSICAL EXAMINATION / OBJECTIVE     Objective:  BP (!) 140/78 (Site: Left Upper Arm, Position: Sitting, Cuff Size: Large Adult)   Pulse 81   Temp 97.9 °F (36.6 °C)   Ht 5' 7.01\" (1.702 m)   SpO2 97%   BMI 29.75 kg/m²     General Appearance: General appearance:  AAO x 3 ,  well-developed and well nourished  Head: normocephalic and atraumatic  Eyes: No gross abnormalities. ,  Sclera non-icteric  Ears / nose:  normal external appearance of left and right ear and nose. No active drainage from nose. mouth:  MMM, ears w/o deformities  Neck: No jugular venous distention, appears symmetric, good ROM    Pulmonary/Chest: CTA bilateral ,  symmetric chest expansion. Cardiovascular: Normal S1 and S2, NO murmur, rub, gallop  : Deferred   Abd/GI: Deferred   Neurologic:  speech normal,   no facial drooping, no asterixis  Skin:  No rash on exposed extremities. Musculoskeletal:  Upper extremities:  Hands:      Tender MCPS 1-3 left 1-5 right,  PIPs - bilat 2-5, DIPs # 1 bilat,. Indistinct 2-3 MCP bilat. Triggering b/l thumbs , A1 pulley noted 3rd finger & thumb bilat. Inability to make a composite fist.  + PIP enlartment. Lower extremities:  Tender MTPs, mid foot, ankles bilat. Right 1st IP        Exam KEY:   Tender :  T    Swelling: S,   Deformities: D,   Non-tender : NT  ,  No swelling: NS        LABS      CBC  Lab Results   Component Value Date    WBC 6.0 11/18/2020    WBC 7.4 09/17/2020    WBC 6.7 08/13/2020    RBC 4.38 11/18/2020    HGB 13.9 11/18/2020    HGB 13.3 09/17/2020    HGB 13.8 08/13/2020    HCT 43.2 11/18/2020    MCV 98.6 11/18/2020    RDW 13.9 06/26/2020     nausea, GI upset. -  Start prednisone 5mg daily x 14 days then 2.5mg daily x 14 days then stops. -  Start arava 10mg daily b/c worsening joint pains. --- ? SSZ or anti-TNF. . (11/23/2020)   --- typical onset of action 4-12 weeks. - Side effects: GI intolerance, diarrhea, alopecia, infection; weight loss, low blood counts, liver injury, ILD, oral sores, rash; RARE: neuropathy, interstitial lung disease. Known teratogen (causes birth defects) - hold medication with infections, avoid Alcohol consumption while on the medication. 2. Osteoarthritis of fingers of hands, bilateral               - celebrex 100mg bid.      3. Chronic bilateral low back pain with left-sided sciatica  4. Moderate severe spinal stenosis  -- s/p lumbar spine surgery Aug 2020. XR lumbar spine with scoliosis, DJD and facet arthritis. ? SI joint change on right per my read. - laminectomy Aug 2020 w/ resoluation of radicular symptoms.        5. Bilateral carpal tunnel syndrome  -- intermittent - right. tinel bilat wrist and elbows, right left CTS release.                - request [facility] records     6. Med monitoring: cbc, cmp q 4 weeks. X 3 with arava started 11/23/2020         No follow-ups on file. Electronically signed by Montrell Lawrence DO on 11/23/2020 at 10:55 AM    New Prescriptions    No medications on file       Thank you for allowing me to participate in the care of this patient. Please call if there are any questions.

## 2020-11-23 NOTE — PATIENT INSTRUCTIONS
Continue celebrex  Stop folic acid and metothrexate  Start arava 10mg daily. Start prednisone 5mg daily x 14 days then 2.5mg daily x 14 days then stops.

## 2020-12-07 NOTE — TELEPHONE ENCOUNTER
SURGERY [address]  [address] [place] [address] [address] DAWIT MCKINNEY AM [place], [address]      Phone *[phone] *[phone]   Surgical Scheduling Direct Line Phone *[phone] Fax *[phone]      Daryl  1960 male    [address]   Marital Status:          Home Phone: [phone]      Cell Phone:    Telephone Information:   Mobile [phone]          Surgeon: Dr. Saundra Garcia  Surgery Date: 12-   Time: Amy Barrera    Procedure: Cystoscopy, transurethral resection of prostate    Diagnosis: BPH with LUTS/ Prostate Cancer     Important Medical History: In Epic    Special Inst/Equip:     CPT Codes:    86318  Latex Allergy:  No     Cardiac Device:  No     Anesthesia:  Anesthesiologist (General/Spinal)          Admission Type:  Same Day                             Admit Prior to Day of Surgery: No    Case Location:  Main OR           Preadmission Testing:Phone Call              PAT Date and Time:______________________________________________________    PAT Confirmation #: ______________________________________________________    Post Op Visit: ___________________________________________________________    Need Preop Cardiac Clearance: No    Does Patient have Cardiologist/physician?      None    Surgery Confirmation #: __________________________________________________    : ________________________   Date: __________________________     [employer] Name: Simone guevara

## 2020-12-16 ENCOUNTER — HOSPITAL ENCOUNTER (OUTPATIENT)
Age: 60
Discharge: HOME | End: 2020-12-16
Payer: COMMERCIAL

## 2020-12-16 ENCOUNTER — HOSPITAL ENCOUNTER (OUTPATIENT)
Age: 60
End: 2020-12-16

## 2020-12-16 DIAGNOSIS — N40.1 BENIGN PROSTATIC HYPERPLASIA WITH URINARY OBSTRUCTION: ICD-10-CM

## 2020-12-16 DIAGNOSIS — Z01.818 PREOPERATIVE TESTING: ICD-10-CM

## 2020-12-16 DIAGNOSIS — N13.8 BENIGN PROSTATIC HYPERPLASIA WITH URINARY OBSTRUCTION: ICD-10-CM

## 2020-12-16 PROCEDURE — 81001 URINALYSIS AUTO W/SCOPE: CPT

## 2020-12-16 PROCEDURE — U0003 INFECTIOUS AGENT DETECTION BY NUCLEIC ACID (DNA OR RNA); SEVERE ACUTE RESPIRATORY SYNDROME CORONAVIRUS 2 (SARS-COV-2) (CORONAVIRUS DISEASE [COVID-19]), AMPLIFIED PROBE TECHNIQUE, MAKING USE OF HIGH THROUGHPUT TECHNOLOGIES AS DESCRIBED BY CMS-2020-01-R: HCPCS

## 2020-12-16 NOTE — PROGRESS NOTES
NPO after midnight  Bring insurance info and 's license  Wear comfortable clean clothing  Do not bring jewelry   Shower night before and morning of surgery with a liquid antibacterial soap  Bring medications in original bottles  Follow all instructions given by your physician   needed at discharge  Call [phone] for any questions  Report to hospitals on 2nd floor  If you would become ill prior to surgery, please call the surgeon  May have only 1 visitor accompany you for surgery  Please bring and wear mask  You will be receiving a phone call one or two days before surgery to review covid screening exposure    covid test will be done today at [facility]

## 2020-12-16 NOTE — PROGRESS NOTES
PAT call attempted, patient unavailable, left message to please call us back at your earliest convenience; [phone]

## 2020-12-17 LAB
BACTERIA: ABNORMAL /HPF
BILIRUBIN URINE: NEGATIVE
BLOOD, URINE: ABNORMAL
CASTS 2: ABNORMAL /LPF
CASTS UA: ABNORMAL /LPF
CHARACTER, URINE: CLEAR
COLOR: YELLOW
CRYSTALS, UA: ABNORMAL
EPITHELIAL CELLS, UA: ABNORMAL /HPF
GLUCOSE URINE: NEGATIVE MG/DL
KETONES, URINE: NEGATIVE
LEUKOCYTE ESTERASE, URINE: NEGATIVE
MISCELLANEOUS 2: ABNORMAL
NITRITE, URINE: NEGATIVE
PH UA: 6 (ref 5–9)
PROTEIN UA: NEGATIVE
RBC URINE: ABNORMAL /HPF
RENAL EPITHELIAL, UA: ABNORMAL
SPECIFIC GRAVITY, URINE: 1.01 (ref 1–1.03)
UROBILINOGEN, URINE: 0.2 EU/DL (ref 0–1)
WBC UA: ABNORMAL /HPF
YEAST: ABNORMAL

## 2020-12-18 ENCOUNTER — TELEPHONE (OUTPATIENT)
Dept: UROLOGY | Age: 60
End: 2020-12-18

## 2020-12-18 ENCOUNTER — PREP FOR PROCEDURE (OUTPATIENT)
Dept: UROLOGY | Age: 60
End: 2020-12-18

## 2020-12-18 LAB — SARS-COV-2: NOT DETECTED

## 2020-12-18 RX ORDER — SODIUM CHLORIDE 9 MG/ML
INJECTION, SOLUTION INTRAVENOUS CONTINUOUS
Status: CANCELLED | OUTPATIENT
Start: 2020-12-23

## 2020-12-18 NOTE — TELEPHONE ENCOUNTER
Per [place] Saavedra at [facility] no prior authorization for CPT code 77362.   Call Ref# [id number]

## 2020-12-18 NOTE — TELEPHONE ENCOUNTER
[address] [facility] Surgery Preop Check Off    Preop testing- done 2 weeks prior and covid testing 1 week prior to surgery date. Continue to give arrival times and inform patients time is subject to change that day as it gets closer to the day of surgery. PREOP check list      Surgeon Dr. Natalie Alicia       Patient Name  Henrique Moseley     1960   MRN   [id number]     DOS   2020  Diagnosis/Indication for Surgery  BPH w/ Luts and prostate cancer   Procedure Cystoscopy, TURP    Allergies     Allergies   Allergen Reactions    Acetaminophen Other (See Comments)     OCCULAR HEADACHES    Codeine      Causes headaches      UA  2020 Blood trace  Urine Culture  Not indicated  Blood thinners  N/A     EKG. 2020  Normal sinus rhythm  Normal ECG  When compared with ECG of 30-MAY-2017 16:47,  No significant change was found    CXR- 2020  1.  Negative exam for acute pathology of the chest    COVID  2020 In Process    Clearance:  Cardiac-   Medical -       Pre-Admission to surgery-

## 2020-12-21 ENCOUNTER — TELEPHONE (OUTPATIENT)
Dept: UROLOGY | Age: 60
End: 2020-12-21

## 2020-12-21 NOTE — TELEPHONE ENCOUNTER
I dont have any suggestions, unless PCP does.    Needs to have motrin, and celebrex on hold due to surgery

## 2020-12-21 NOTE — TELEPHONE ENCOUNTER
Patient has some prednisone 5 mg left. He has couple doses left. Can he take those? Please advise. Thank you.

## 2020-12-21 NOTE — TELEPHONE ENCOUNTER
Patient stated he stopped the celebrex for surgery. He has a lot of pain in his hands from RA. Tylenol is not helping and he does not like to take to much of the ultram because it causes his blood pressure to raise. Is there anything else he can take? Please advise. Thank you.

## 2020-12-23 ENCOUNTER — ANESTHESIA (OUTPATIENT)
Dept: OPERATING ROOM | Age: 60
End: 2020-12-23
Payer: COMMERCIAL

## 2020-12-23 ENCOUNTER — ANESTHESIA EVENT (OUTPATIENT)
Dept: OPERATING ROOM | Age: 60
End: 2020-12-23
Payer: COMMERCIAL

## 2020-12-23 ENCOUNTER — HOSPITAL ENCOUNTER (OUTPATIENT)
Age: 60
Setting detail: OUTPATIENT SURGERY
Discharge: HOME | End: 2020-12-23
Attending: UROLOGY | Admitting: UROLOGY
Payer: COMMERCIAL

## 2020-12-23 VITALS
SYSTOLIC BLOOD PRESSURE: 136 MMHG | HEART RATE: 72 BPM | HEIGHT: 67 IN | RESPIRATION RATE: 16 BRPM | DIASTOLIC BLOOD PRESSURE: 80 MMHG | TEMPERATURE: 98 F | BODY MASS INDEX: 29.91 KG/M2 | WEIGHT: 190.6 LBS | OXYGEN SATURATION: 94 %

## 2020-12-23 VITALS
OXYGEN SATURATION: 86 % | RESPIRATION RATE: 2 BRPM | SYSTOLIC BLOOD PRESSURE: 115 MMHG | DIASTOLIC BLOOD PRESSURE: 68 MMHG | TEMPERATURE: 89.2 F

## 2020-12-23 PROCEDURE — 2580000003 HC RX 258

## 2020-12-23 PROCEDURE — 2500000003 HC RX 250 WO HCPCS: Performed by: NURSE ANESTHETIST, CERTIFIED REGISTERED

## 2020-12-23 PROCEDURE — 6360000002 HC RX W HCPCS

## 2020-12-23 PROCEDURE — 7100000010 HC PHASE II RECOVERY - FIRST 15 MIN: Performed by: UROLOGY

## 2020-12-23 PROCEDURE — 7100000000 HC PACU RECOVERY - FIRST 15 MIN: Performed by: UROLOGY

## 2020-12-23 PROCEDURE — 3700000001 HC ADD 15 MINUTES (ANESTHESIA): Performed by: UROLOGY

## 2020-12-23 PROCEDURE — 2709999900 HC NON-CHARGEABLE SUPPLY: Performed by: UROLOGY

## 2020-12-23 PROCEDURE — 7100000011 HC PHASE II RECOVERY - ADDTL 15 MIN: Performed by: UROLOGY

## 2020-12-23 PROCEDURE — 6360000002 HC RX W HCPCS: Performed by: NURSE ANESTHETIST, CERTIFIED REGISTERED

## 2020-12-23 PROCEDURE — 6370000000 HC RX 637 (ALT 250 FOR IP)

## 2020-12-23 PROCEDURE — 3700000000 HC ANESTHESIA ATTENDED CARE: Performed by: UROLOGY

## 2020-12-23 PROCEDURE — 7100000001 HC PACU RECOVERY - ADDTL 15 MIN: Performed by: UROLOGY

## 2020-12-23 PROCEDURE — 88305 TISSUE EXAM BY PATHOLOGIST: CPT

## 2020-12-23 PROCEDURE — 3600000003 HC SURGERY LEVEL 3 BASE: Performed by: UROLOGY

## 2020-12-23 PROCEDURE — 3600000013 HC SURGERY LEVEL 3 ADDTL 15MIN: Performed by: UROLOGY

## 2020-12-23 RX ORDER — LIDOCAINE HCL/PF 100 MG/5ML
SYRINGE (ML) INJECTION PRN
Status: DISCONTINUED | OUTPATIENT
Start: 2020-12-23 | End: 2020-12-23 | Stop reason: SDUPTHER

## 2020-12-23 RX ORDER — FENTANYL CITRATE 50 UG/ML
INJECTION, SOLUTION INTRAMUSCULAR; INTRAVENOUS PRN
Status: DISCONTINUED | OUTPATIENT
Start: 2020-12-23 | End: 2020-12-23 | Stop reason: SDUPTHER

## 2020-12-23 RX ORDER — FENTANYL CITRATE 50 UG/ML
50 INJECTION, SOLUTION INTRAMUSCULAR; INTRAVENOUS EVERY 5 MIN PRN
Status: DISCONTINUED | OUTPATIENT
Start: 2020-12-23 | End: 2020-12-23 | Stop reason: HOSPADM

## 2020-12-23 RX ORDER — TRAMADOL HYDROCHLORIDE 50 MG/1
50 TABLET ORAL EVERY 6 HOURS PRN
Status: DISCONTINUED | OUTPATIENT
Start: 2020-12-23 | End: 2020-12-23 | Stop reason: HOSPADM

## 2020-12-23 RX ORDER — ATROPA BELLADONNA AND OPIUM 16.2; 3 MG/1; MG/1
30 SUPPOSITORY RECTAL ONCE
Status: COMPLETED | OUTPATIENT
Start: 2020-12-23 | End: 2020-12-23

## 2020-12-23 RX ORDER — PROMETHAZINE HYDROCHLORIDE 25 MG/ML
12.5 INJECTION, SOLUTION INTRAMUSCULAR; INTRAVENOUS
Status: DISCONTINUED | OUTPATIENT
Start: 2020-12-23 | End: 2020-12-23 | Stop reason: HOSPADM

## 2020-12-23 RX ORDER — LABETALOL 20 MG/4 ML (5 MG/ML) INTRAVENOUS SYRINGE
10 EVERY 10 MIN PRN
Status: DISCONTINUED | OUTPATIENT
Start: 2020-12-23 | End: 2020-12-23 | Stop reason: HOSPADM

## 2020-12-23 RX ORDER — FENTANYL CITRATE 50 UG/ML
25 INJECTION, SOLUTION INTRAMUSCULAR; INTRAVENOUS EVERY 5 MIN PRN
Status: DISCONTINUED | OUTPATIENT
Start: 2020-12-23 | End: 2020-12-23 | Stop reason: HOSPADM

## 2020-12-23 RX ORDER — SODIUM CHLORIDE 9 MG/ML
INJECTION, SOLUTION INTRAVENOUS CONTINUOUS
Status: DISCONTINUED | OUTPATIENT
Start: 2020-12-23 | End: 2020-12-23 | Stop reason: HOSPADM

## 2020-12-23 RX ORDER — ATROPA BELLADONNA AND OPIUM 16.2; 3 MG/1; MG/1
SUPPOSITORY RECTAL
Status: COMPLETED
Start: 2020-12-23 | End: 2020-12-23

## 2020-12-23 RX ORDER — CEPHALEXIN 500 MG/1
500 CAPSULE ORAL 3 TIMES DAILY
Qty: 9 CAPSULE | Refills: 0 | Status: SHIPPED | OUTPATIENT
Start: 2020-12-23 | End: 2020-12-26

## 2020-12-23 RX ORDER — TRAMADOL HYDROCHLORIDE 50 MG/1
50 TABLET ORAL EVERY 6 HOURS PRN
Qty: 10 TABLET | Refills: 0 | Status: SHIPPED | OUTPATIENT
Start: 2020-12-23 | End: 2021-01-02

## 2020-12-23 RX ORDER — PROPOFOL 10 MG/ML
INJECTION, EMULSION INTRAVENOUS PRN
Status: DISCONTINUED | OUTPATIENT
Start: 2020-12-23 | End: 2020-12-23 | Stop reason: SDUPTHER

## 2020-12-23 RX ORDER — ONDANSETRON 2 MG/ML
INJECTION INTRAMUSCULAR; INTRAVENOUS PRN
Status: DISCONTINUED | OUTPATIENT
Start: 2020-12-23 | End: 2020-12-23 | Stop reason: SDUPTHER

## 2020-12-23 RX ORDER — DEXAMETHASONE SODIUM PHOSPHATE 10 MG/ML
INJECTION, EMULSION INTRAMUSCULAR; INTRAVENOUS PRN
Status: DISCONTINUED | OUTPATIENT
Start: 2020-12-23 | End: 2020-12-23 | Stop reason: SDUPTHER

## 2020-12-23 RX ORDER — MIDAZOLAM HYDROCHLORIDE 1 MG/ML
INJECTION INTRAMUSCULAR; INTRAVENOUS PRN
Status: DISCONTINUED | OUTPATIENT
Start: 2020-12-23 | End: 2020-12-23 | Stop reason: SDUPTHER

## 2020-12-23 RX ADMIN — ONDANSETRON HYDROCHLORIDE 4 MG: 4 INJECTION, SOLUTION INTRAMUSCULAR; INTRAVENOUS at 11:46

## 2020-12-23 RX ADMIN — MIDAZOLAM HYDROCHLORIDE 2 MG: 1 INJECTION, SOLUTION INTRAMUSCULAR; INTRAVENOUS at 11:34

## 2020-12-23 RX ADMIN — Medication 80 MG: at 11:36

## 2020-12-23 RX ADMIN — CEFAZOLIN 2 G: 10 INJECTION, POWDER, FOR SOLUTION INTRAVENOUS at 11:43

## 2020-12-23 RX ADMIN — SODIUM CHLORIDE: 9 INJECTION, SOLUTION INTRAVENOUS at 10:11

## 2020-12-23 RX ADMIN — PROPOFOL 200 MG: 10 INJECTION, EMULSION INTRAVENOUS at 11:37

## 2020-12-23 RX ADMIN — DEXAMETHASONE SODIUM PHOSPHATE 10 MG: 10 INJECTION, EMULSION INTRAMUSCULAR; INTRAVENOUS at 11:45

## 2020-12-23 RX ADMIN — FENTANYL CITRATE 100 MCG: 50 INJECTION, SOLUTION INTRAMUSCULAR; INTRAVENOUS at 11:36

## 2020-12-23 RX ADMIN — ATROPA BELLADONNA AND OPIUM 30 MG: 16.2; 3 SUPPOSITORY RECTAL at 12:40

## 2020-12-23 ASSESSMENT — PULMONARY FUNCTION TESTS
PIF_VALUE: 19
PIF_VALUE: 19
PIF_VALUE: 16
PIF_VALUE: 16
PIF_VALUE: 19
PIF_VALUE: 16
PIF_VALUE: 19
PIF_VALUE: 1
PIF_VALUE: 16
PIF_VALUE: 5
PIF_VALUE: 19
PIF_VALUE: 16
PIF_VALUE: 16
PIF_VALUE: 19
PIF_VALUE: 16
PIF_VALUE: 19
PIF_VALUE: 16
PIF_VALUE: 19
PIF_VALUE: 19
PIF_VALUE: 16
PIF_VALUE: 19
PIF_VALUE: 1
PIF_VALUE: 16
PIF_VALUE: 16
PIF_VALUE: 19
PIF_VALUE: 17
PIF_VALUE: 16
PIF_VALUE: 1
PIF_VALUE: 16
PIF_VALUE: 19
PIF_VALUE: 1
PIF_VALUE: 2
PIF_VALUE: 16
PIF_VALUE: 19
PIF_VALUE: 16
PIF_VALUE: 3
PIF_VALUE: 16
PIF_VALUE: 19
PIF_VALUE: 16
PIF_VALUE: 19
PIF_VALUE: 1
PIF_VALUE: 16
PIF_VALUE: 16
PIF_VALUE: 1
PIF_VALUE: 2
PIF_VALUE: 19
PIF_VALUE: 16
PIF_VALUE: 1
PIF_VALUE: 16
PIF_VALUE: 19
PIF_VALUE: 16
PIF_VALUE: 1

## 2020-12-23 ASSESSMENT — PAIN SCALES - GENERAL
PAINLEVEL_OUTOF10: 0
PAINLEVEL_OUTOF10: 0
PAINLEVEL_OUTOF10: 4
PAINLEVEL_OUTOF10: 0

## 2020-12-23 NOTE — ANESTHESIA POSTPROCEDURE EVALUATION
Department of Anesthesiology  Postprocedure Note    Patient: June Kim  MRN: [id number]  YOB: 1960  Date of evaluation: 12/23/2020  Time:  3:42 PM     Procedure Summary     Date: 12/23/20 Room / Location: [address] [place] [address] / [facility]    Anesthesia Start: 8525 Anesthesia Stop: 1232    Procedure: CYSTOSCOPY TRANSURETHRAL RESECTION PROSTATE (N/A ) Diagnosis: (BPH WITH LUTS/PROSTATE CANCER)    Surgeons: Aurelio Dickey MD Responsible Provider: Terri Sutherland DO    Anesthesia Type: general ASA Status: 2          Anesthesia Type: general    Juvenal Phase I: Juvenal Score: 10    Juvenal Phase II: Juvenal Score: 10    Last vitals: Reviewed and per EMR flowsheets. Anesthesia Post Evaluation    Comments: Wayne Prince 61  POST-ANESTHESIA NOTE       Name:  June Kim                                         Age:  62 y.o.   MRN:  [id number]      Last Vitals:  /80   Pulse 72   Temp 98 °F (36.7 °C) (Temporal)   Resp 16   Ht 5' 7\" (1.702 m)   Wt 190 lb 9.6 oz (86.5 kg)   SpO2 94%   BMI 29.85 kg/m²   Patient Vitals in the past 4 hrs:  12/23/20 1421, BP:136/80, Pulse:72, Resp:16, SpO2:94 %  12/23/20 1353, BP:113/63, Pulse:68, Resp:16, SpO2:95 %  12/23/20 1315, BP:115/71, Pulse:71, Resp:16, SpO2:94 %  12/23/20 1305, BP:130/78, Pulse:77, Resp:19, SpO2:94 %  12/23/20 1300, BP:127/74, Pulse:71, Resp:16, SpO2:94 %  12/23/20 1255, BP:117/76, Pulse:72, Resp:13, SpO2:93 %  12/23/20 1250, BP:118/75, Pulse:70, Resp:13, SpO2:95 %  12/23/20 1245, BP:134/83, Pulse:71, Resp:14, SpO2:94 %  12/23/20 1240, BP:132/78, Pulse:77, Resp:16, SpO2:96 %  12/23/20 1235, BP:135/78, Pulse:69, Resp:16, SpO2:93 %  12/23/20 1230, BP:132/79, Temp:98 °F (36.7 °C), Temp src:Temporal, Pulse:71, Resp:11, SpO2:96 %    Level of Consciousness:  Awake    Respiratory:  Stable    Oxygen Saturation:  Stable    Cardiovascular:  Stable    Hydration:  Adequate    PONV:  Stable    Post-op Pain:  Adequate analgesia Post-op Assessment:  No apparent anesthetic complications    Additional Follow-Up / Treatment / Comment:  None    Melvi Zelaya.  DO Shahnaz  December 23, 2020   3:42 PM

## 2020-12-23 NOTE — PROGRESS NOTES
Pt in bed with  family by side. No drainage from incision site. Pt has no color in urine. continuous bladder irrigation is on going slowed down.

## 2020-12-23 NOTE — PROGRESS NOTES
Pt and wife was instructed how to empty and wash sullivan. Pt and wife stated understanding how to empty ans wash sullivan. Discharge criteria was met. Patient stated understanding discharge instructions. prescriptions given to patient. Left via Wheelchair.  Assessment of Surgical site no drainage

## 2020-12-23 NOTE — PROGRESS NOTES
1230 Pt transferred to PACU, see flow sheet for assessment. 1240 B&O ok'd per ZBIGNIEW Mondragon -see MAR.   1104 ZBIGNIEW Mondragon at bedside talking to pt. 1254 Urine colorless/clear.    1306 Pt [facility] to [facility] [place] [facility], report given to James Larry, visitor in room

## 2020-12-23 NOTE — H&P
Dr. Alla Monge MD  [facility])  Urology Clinic      Patient:  Yuki Ann  YOB: 1960  Date: 12/23/2020    HISTORY OF PRESENT ILLNESS:   The patient is a 62 y.o. male who presents today for evaluation of the following problem(s): Elevated PSA. No family history of prostate cancer. Severe LUTS from BPH. 1 year. No hematuria. Dribbling of urine. Very slow stream. New today is Cochrane 6 prostate cancer. We discussed all options with him today. Prostate was 68g on biopsy. Cysto showed severe BPH with median lobe. Overall the problem(s) : are worsening. Associated Symptoms: No dysuria, gross hematuria. Pain Severity: 0/10    Summary of old records:   None    Imaging/Labs reviewed during today's visit:  I have independently reviewed and verified the following films during today's visit. PSA    Last several PSA's:  Lab Results   Component Value Date    PSA 6.19 (H) 06/26/2020       Last total testosterone:  No results found for: TESTOSTERONE    Urinalysis today:  No results found for this visit on 09/30/20.       Last BUN and creatinine:  Lab Results   Component Value Date    BUN 19 11/18/2020     Lab Results   Component Value Date    CREATININE 0.9 11/18/2020       Imaging Reviewed during this Office Visit:   (results were independently reviewed by physician and radiology report verified)    PAST MEDICAL, FAMILY AND SOCIAL HISTORY:  Past Medical History:   Diagnosis Date    Asthma     Difficult intubation     in 8/25/2020 difficult to intubate when he had cervical sx    Hypertension     Rheumatoid arthritis ([address]     Stomach ulcer      Past Surgical History:   Procedure Laterality Date    [place]     CARPAL TUNNEL RELEASE Left 03/05/2020    CERVICAL [address] SURGERY      fusion in 2005   [address] [address]  2006    fusion C3 C4 C5    ULTRASOUND PROSTATE/TRANSRECTAL N/A 9/23/2020    CYSTOSCOPY AND TRANSRECTAL ULTRASOUND GUIDED PROSTATE BIOPSY performed by Pawel Larios Abimbola Abbasi MD at [facility] History   Problem Relation Age of Onset    Hypertension Mother     Esophageal Cancer Father         METS TO LIVER CA    Cancer Father         esophageal    Arthritis Son      Outpatient Medications Marked as Taking for the 20 encounter [facility] Encounter)   Medication Sig Dispense Refill    leflunomide (ARAVA) 10 MG tablet Take 1 tablet by mouth daily 30 tablet 0    predniSONE (DELTASONE) 5 MG tablet Take 1 tablet by mouth daily 30 tablet 1    celecoxib (CELEBREX) 100 MG capsule Take 1 capsule by mouth twice daily 60 capsule 2    traMADol (ULTRAM) 50 MG tablet Take 50 mg by mouth every 6 hours as needed for Pain.  traZODone (DESYREL) 50 MG tablet Take 2 tablets by mouth nightly (Patient taking differently: Take 50 mg by mouth nightly ) 60 tablet 1    amLODIPine (NORVASC) 5 MG tablet every evening       tamsulosin (FLOMAX) 0.4 MG capsule TAKE 1 CAPSULE BY MOUTH ONCE DAILY      lisinopril (PRINIVIL;ZESTRIL) 20 MG tablet Take 20 mg by mouth daily         Acetaminophen and Codeine  Social History     Tobacco Use   Smoking Status Former Smoker    Years: 15.00    Quit date: 2008    Years since quittin.8   Smokeless Tobacco Never Used       Social History     Substance and Sexual Activity   Alcohol Use Never    Frequency: Never       REVIEW OF SYSTEMS:  Constitutional: negative  Eyes: negative  Respiratory: negative  Cardiovascular: negative  Gastrointestinal: negative  Musculoskeletal: negative  Genitourinary: negative except for what is in HPI  Skin: negative   Neurological: negative  Hematological/Lymphatic: negative  Psychological: negative    Physical Exam:    This a 62 y.o. male   Vitals:    20 0932   BP: (!) 135/95   Pulse: 78   Resp: 18   Temp: 98.5 °F (36.9 °C)   SpO2: 96%     Constitutional: Patient in no acute distress; Neuro: alert and oriented to person place and time.     Psych: Mood and affect normal.  Skin: Normal  Lungs: Respiratory effort normal  Cardiovascular:  Normal peripheral pulses  Abdomen: Soft, non-tender, non-distended with no CVA, flank pain, hepatosplenomegaly or hernia. Kidneys normal.  Bladder non-tender and not distended. Lymphatics: no palpable lymphadenopathy       Assessment and Plan       BPH without obstruction. Low risk [place]il[place] 6 prostate cancer. Plan:      No follow-ups on file. Severe LUTS resistant to medical management with flomax. Cysto showed severe BPH with median lobe. Will schedule for TURP.           Dr. Ruben Orozco MD

## 2020-12-23 NOTE — ANESTHESIA PRE PROCEDURE
Department of Anesthesiology  Preprocedure Note       Name:  Porfirio Bryson   Age:  62 y.o.  :  1960                                          MRN:  [id number]         Date:  2020      Surgeon: Roel Chong):  Sofia Wu MD    Procedure: Procedure(s):  CYSTOSCOPY TRANSURETHRAL RESECTION PROSTATE    Medications prior to admission:   Prior to Admission medications    Medication Sig Start Date End Date Taking? Authorizing Provider   leflunomide (ARAVA) 10 MG tablet Take 1 tablet by mouth daily 20  Yes Trayton RUDY Cheatham, DO   predniSONE (DELTASONE) 5 MG tablet Take 1 tablet by mouth daily 20  Yes Trayton B Linden, DO   celecoxib (CELEBREX) 100 MG capsule Take 1 capsule by mouth twice daily 10/6/20  Yes RENATO Angel CNP   traMADol (ULTRAM) 50 MG tablet Take 50 mg by mouth every 6 hours as needed for Pain. Yes Historical Provider, MD   traZODone (DESYREL) 50 MG tablet Take 2 tablets by mouth nightly  Patient taking differently: Take 50 mg by mouth nightly  20 Yes Rosibel Cheatham, DO   amLODIPine (NORVASC) 5 MG tablet every evening  20  Yes Historical Provider, MD   tamsulosin (FLOMAX) 0.4 MG capsule TAKE 1 CAPSULE BY MOUTH ONCE DAILY 20  Yes Historical Provider, MD   lisinopril (PRINIVIL;ZESTRIL) 20 MG tablet Take 20 mg by mouth daily   Yes Historical Provider, MD   Albuterol (PROVENTIL IN) Inhale into the lungs as needed    Historical Provider, MD       Current medications:    Current Facility-Administered Medications   Medication Dose Route Frequency Provider Last Rate Last Admin    0.9 % sodium chloride infusion   Intravenous Continuous Kelley Long 100 mL/hr at 20 1011 New Bag at 20 1011    ceFAZolin (ANCEF) 2 g in dextrose 5 % 50 mL IVPB  2 g Intravenous 30 Min Pre-Op Kelley Manuel           Allergies:     Allergies   Allergen Reactions    Acetaminophen Other (See Comments)     OCCULAR HEADACHES    Codeine      Causes headaches Problem List:  There is no problem list on file for this patient. Past Medical History:        Diagnosis Date    Asthma     Difficult intubation     in 2020 difficult to intubate when he had cervical sx    Hypertension     Rheumatoid arthritis ([address]     Stomach ulcer        Past Surgical History:        Procedure Laterality Date    [place]     CARPAL TUNNEL RELEASE Left 2020    CERVICAL [address] SURGERY      fusion in    [address] [address]  2006    fusion C3 C4 C5    ULTRASOUND PROSTATE/TRANSRECTAL N/A 2020    CYSTOSCOPY AND TRANSRECTAL ULTRASOUND GUIDED PROSTATE BIOPSY performed by Cruz Lloyd MD at [place] [address] History:    Social History     Tobacco Use    Smoking status: Former Smoker     Years: 15.00     Quit date: 2008     Years since quittin.8    Smokeless tobacco: Never Used   Substance Use Topics    Alcohol use: Never     Frequency: Never                                Counseling given: Not Answered      Vital Signs (Current):   Vitals:    20 1408 20 0932   BP:  (!) 135/95   Pulse:  78   Resp:  18   Temp:  98.5 °F (36.9 °C)   TempSrc:  Temporal   SpO2:  96%   Weight: 185 lb (83.9 kg) 190 lb 9.6 oz (86.5 kg)   Height: 5' 7\" (1.702 m)                                               BP Readings from Last 3 Encounters:   20 (!) 135/95   20 (!) 140/78   20 (!) 130/58       NPO Status: Time of last liquid consumption:                         Time of last solid consumption:                         Date of last liquid consumption: 20                        Date of last solid food consumption: 20    BMI:   Wt Readings from Last 3 Encounters:   20 190 lb 9.6 oz (86.5 kg)   20 190 lb (86.2 kg)   20 190 lb 3.2 oz (86.3 kg)     Body mass index is 29.85 kg/m².     CBC:   Lab Results   Component Value Date    WBC 6.0 2020    RBC 4.38 2020    HGB 13.9 2020

## 2020-12-23 NOTE — PROGRESS NOTES
Oriented to [facility]     17      Refuses flu and pneumonia vaccine. Family updated to stay in room. Informed family to take belonging with them if they leave. Keep nothing of value in room unattended. Mer Mora was asked and agreed to first name and last initial being put on white boards. Allergy and fall risks applied. SCD Applied to patient. Warming blanket applied to patient. Pt denies any abuse or thoughts of suicide.

## 2020-12-24 ENCOUNTER — NURSE ONLY (OUTPATIENT)
Dept: UROLOGY | Age: 60
End: 2020-12-24

## 2020-12-24 PROCEDURE — 99999 PR OFFICE/OUTPT VISIT,PROCEDURE ONLY: CPT | Performed by: UROLOGY

## 2020-12-24 NOTE — PROGRESS NOTES
Patient has given me verbal consent to perform sullivan removal yes    30 cc of water deflated from sullivan balloon. 22 Fr sullivan removed without difficulty. He will drink fluids and call in 4-6 hours if patient has not urinated. F/u with Dr. Shawanda Mondragon as scheduled.

## 2020-12-30 ENCOUNTER — HOSPITAL ENCOUNTER (OUTPATIENT)
Age: 60
Discharge: HOME | End: 2020-12-30
Payer: COMMERCIAL

## 2020-12-30 DIAGNOSIS — Z51.81 MEDICATION MONITORING ENCOUNTER: ICD-10-CM

## 2020-12-30 LAB
ALBUMIN SERPL-MCNC: 3.8 G/DL (ref 3.5–5.1)
ALP BLD-CCNC: 84 U/L (ref 38–126)
ALT SERPL-CCNC: 27 U/L (ref 11–66)
ANION GAP SERPL CALCULATED.3IONS-SCNC: 8 MEQ/L (ref 8–16)
AST SERPL-CCNC: 17 U/L (ref 5–40)
BASOPHILS # BLD: 0.7 %
BASOPHILS ABSOLUTE: 0.1 THOU/MM3 (ref 0–0.1)
BILIRUB SERPL-MCNC: 0.2 MG/DL (ref 0.3–1.2)
BUN BLDV-MCNC: 21 MG/DL (ref 7–22)
C-REACTIVE PROTEIN: 0.23 MG/DL (ref 0–1)
CALCIUM SERPL-MCNC: 9 MG/DL (ref 8.5–10.5)
CHLORIDE BLD-SCNC: 104 MEQ/L (ref 98–111)
CO2: 25 MEQ/L (ref 23–33)
CREAT SERPL-MCNC: 0.9 MG/DL (ref 0.4–1.2)
EOSINOPHIL # BLD: 2.6 %
EOSINOPHILS ABSOLUTE: 0.2 THOU/MM3 (ref 0–0.4)
ERYTHROCYTE [DISTWIDTH] IN BLOOD BY AUTOMATED COUNT: 12.2 % (ref 11.5–14.5)
ERYTHROCYTE [DISTWIDTH] IN BLOOD BY AUTOMATED COUNT: 42.3 FL (ref 35–45)
GFR SERPL CREATININE-BSD FRML MDRD: 86 ML/MIN/1.73M2
GLUCOSE BLD-MCNC: 109 MG/DL (ref 70–108)
HCT VFR BLD CALC: 46 % (ref 42–52)
HEMOGLOBIN: 15.2 GM/DL (ref 14–18)
IMMATURE GRANS (ABS): 0.04 THOU/MM3 (ref 0–0.07)
IMMATURE GRANULOCYTES: 0.5 %
LYMPHOCYTES # BLD: 25.4 %
LYMPHOCYTES ABSOLUTE: 1.9 THOU/MM3 (ref 1–4.8)
MCH RBC QN AUTO: 31.4 PG (ref 26–33)
MCHC RBC AUTO-ENTMCNC: 33 GM/DL (ref 32.2–35.5)
MCV RBC AUTO: 95 FL (ref 80–94)
MONOCYTES # BLD: 10.5 %
MONOCYTES ABSOLUTE: 0.8 THOU/MM3 (ref 0.4–1.3)
NUCLEATED RED BLOOD CELLS: 0 /100 WBC
PLATELET # BLD: 340 THOU/MM3 (ref 130–400)
PMV BLD AUTO: 10.1 FL (ref 9.4–12.4)
POTASSIUM SERPL-SCNC: 4.1 MEQ/L (ref 3.5–5.2)
RBC # BLD: 4.84 MILL/MM3 (ref 4.7–6.1)
SEDIMENTATION RATE, ERYTHROCYTE: 7 MM/HR (ref 0–10)
SEG NEUTROPHILS: 60.3 %
SEGMENTED NEUTROPHILS ABSOLUTE COUNT: 4.4 THOU/MM3 (ref 1.8–7.7)
SODIUM BLD-SCNC: 137 MEQ/L (ref 135–145)
TOTAL PROTEIN: 6.4 G/DL (ref 6.1–8)
WBC # BLD: 7.3 THOU/MM3 (ref 4.8–10.8)

## 2020-12-30 PROCEDURE — 36415 COLL VENOUS BLD VENIPUNCTURE: CPT

## 2020-12-30 PROCEDURE — 85025 COMPLETE CBC W/AUTO DIFF WBC: CPT

## 2020-12-30 PROCEDURE — 85651 RBC SED RATE NONAUTOMATED: CPT

## 2020-12-30 PROCEDURE — 86140 C-REACTIVE PROTEIN: CPT

## 2020-12-30 PROCEDURE — 80053 COMPREHEN METABOLIC PANEL: CPT

## 2021-01-18 ENCOUNTER — HOSPITAL ENCOUNTER (OUTPATIENT)
Age: 61
Discharge: HOME | End: 2021-01-18
Payer: COMMERCIAL

## 2021-01-18 ENCOUNTER — TELEPHONE (OUTPATIENT)
Dept: UROLOGY | Age: 61
End: 2021-01-18

## 2021-01-18 DIAGNOSIS — R31.9 HEMATURIA, UNSPECIFIED TYPE: Primary | ICD-10-CM

## 2021-01-18 DIAGNOSIS — R31.9 HEMATURIA, UNSPECIFIED TYPE: ICD-10-CM

## 2021-01-18 LAB
AMORPHOUS: ABNORMAL
BACTERIA: ABNORMAL
BILIRUBIN URINE: NEGATIVE
BLOOD, URINE: ABNORMAL
CASTS UA: ABNORMAL /LPF
CHARACTER, URINE: ABNORMAL
COLOR: YELLOW
CRYSTALS, UA: ABNORMAL
EPITHELIAL CELLS, UA: ABNORMAL /HPF
GLUCOSE, URINE: NEGATIVE MG/DL
KETONES, URINE: NEGATIVE
LEUKOCYTE ESTERASE, URINE: ABNORMAL
MUCUS: ABNORMAL
NITRITE, URINE: NEGATIVE
PH UA: 6 (ref 5–9)
PROTEIN UA: 100 MG/DL
RBC URINE: ABNORMAL /HPF
SPECIFIC GRAVITY UA: 1.02 (ref 1–1.03)
UROBILINOGEN, URINE: 0.2 EU/DL (ref 0.2–1)
WBC UA: ABNORMAL /HPF

## 2021-01-18 PROCEDURE — 87086 URINE CULTURE/COLONY COUNT: CPT

## 2021-01-18 PROCEDURE — 81001 URINALYSIS AUTO W/SCOPE: CPT

## 2021-01-18 NOTE — TELEPHONE ENCOUNTER
Can collect urine.    Ok to monitor for now if he is voiding clear urine and feels like he is emptying   Prevent constipation  No heavy lifting

## 2021-01-18 NOTE — TELEPHONE ENCOUNTER
Patient will give the urine sample to the lab. He will call the office if the hematuria gets worse or unable to urinate. He denies constipation and denies doing heavy lifting.

## 2021-01-18 NOTE — TELEPHONE ENCOUNTER
Patient underwent TURP on 12/23/2020 with Dr Arlette Blake. He had several episodes of passing clots over the weekend. Some of the clots were large. The urine is pink tinge at times. The urine cleared up 2 weeks after surgery. Friday he had a few clots, Saturday he passed larger ones and Sunday he had to forcefully to urinate and passed a large clot. Today the urine clear. He denies taking any blood thinners but takes celebrex. He has urgency and still trying to get the control of his stream since surgery. He denies fever, chills, burning, and frequency. He has tried to increase his fluid intake. He drinks several glasses of water a day. Please advise. Thank you.

## 2021-01-20 ENCOUNTER — TELEPHONE (OUTPATIENT)
Dept: UROLOGY | Age: 61
End: 2021-01-20

## 2021-01-20 LAB — URINE CULTURE, ROUTINE: NORMAL

## 2021-02-03 ENCOUNTER — OFFICE VISIT (OUTPATIENT)
Dept: UROLOGY | Age: 61
End: 2021-02-03
Payer: COMMERCIAL

## 2021-02-03 ENCOUNTER — TELEPHONE (OUTPATIENT)
Dept: RHEUMATOLOGY | Age: 61
End: 2021-02-03

## 2021-02-03 VITALS — HEIGHT: 67 IN | BODY MASS INDEX: 29.82 KG/M2 | WEIGHT: 190 LBS | TEMPERATURE: 97.5 F

## 2021-02-03 DIAGNOSIS — M25.50 POLYARTHRALGIA: ICD-10-CM

## 2021-02-03 DIAGNOSIS — C61 PROSTATE CANCER (HCC): ICD-10-CM

## 2021-02-03 DIAGNOSIS — N40.0 BENIGN PROSTATIC HYPERPLASIA, UNSPECIFIED WHETHER LOWER URINARY TRACT SYMPTOMS PRESENT: Primary | ICD-10-CM

## 2021-02-03 DIAGNOSIS — R97.20 ELEVATED PSA: ICD-10-CM

## 2021-02-03 DIAGNOSIS — M19.042 OSTEOARTHRITIS OF FINGERS OF HANDS, BILATERAL: ICD-10-CM

## 2021-02-03 DIAGNOSIS — M19.041 OSTEOARTHRITIS OF FINGERS OF HANDS, BILATERAL: ICD-10-CM

## 2021-02-03 LAB — POST VOID RESIDUAL (PVR): 0 ML

## 2021-02-03 PROCEDURE — 51798 US URINE CAPACITY MEASURE: CPT | Performed by: UROLOGY

## 2021-02-03 PROCEDURE — 99024 POSTOP FOLLOW-UP VISIT: CPT | Performed by: UROLOGY

## 2021-02-03 RX ORDER — CELECOXIB 100 MG/1
CAPSULE ORAL
Qty: 60 CAPSULE | Refills: 0 | Status: SHIPPED | OUTPATIENT
Start: 2021-02-03 | End: 2021-06-08 | Stop reason: SDUPTHER

## 2021-02-03 NOTE — TELEPHONE ENCOUNTER
Phoned him to schedule. He is not wanting to follow up anymore. He states he stopped taking his Leflunomide the end of the year due to cost and cost of labs. He is only using the Celebrex and states his PCP will take over the care of that.  He will call if he wants to come back

## 2021-02-03 NOTE — PROGRESS NOTES
TUNNEL RELEASE Left 2020    CERVICAL DISC SURGERY      fusion in    [address] [address]  2006    fusion C3 C4 C5    TURP N/A 2020    CYSTOSCOPY TRANSURETHRAL RESECTION PROSTATE performed by Bryan Logan MD at [address] [facility] PROSTATE/TRANSRECTAL N/A 2020    CYSTOSCOPY AND TRANSRECTAL ULTRASOUND GUIDED PROSTATE BIOPSY performed by Bryan Logan MD at [facility] History   Problem Relation Age of Onset    Hypertension Mother     Esophageal Cancer Father         METS TO LIVER CA    Cancer Father         esophageal    Arthritis Son      Outpatient Medications Marked as Taking for the 2/3/21 encounter (Office Visit) with Bryan Logan MD   Medication Sig Dispense Refill    amLODIPine (NORVASC) 5 MG tablet every evening       lisinopril (PRINIVIL;ZESTRIL) 20 MG tablet Take 20 mg by mouth daily         Acetaminophen and Codeine  Social History     Tobacco Use   Smoking Status Former Smoker    Years: 15.00    Quit date: 2008    Years since quittin.9   Smokeless Tobacco Never Used       Social History     Substance and Sexual Activity   Alcohol Use Never    Frequency: Never       REVIEW OF SYSTEMS:  Constitutional: negative  Eyes: negative  Respiratory: negative  Cardiovascular: negative  Gastrointestinal: negative  Musculoskeletal: negative  Genitourinary: negative except for what is in HPI  Skin: negative   Neurological: negative  Hematological/Lymphatic: negative  Psychological: negative    Physical Exam:    This a 62 y.o. male   Vitals:    21 1115   Temp: 97.5 °F (36.4 °C)     Constitutional: Patient in no acute distress; Neuro: alert and oriented to person place and time. Psych: Mood and affect normal.  Skin: Normal  Lungs: Respiratory effort normal  Cardiovascular:  Normal peripheral pulses  Abdomen: Soft, non-tender, non-distended with no CVA, flank pain, hepatosplenomegaly or hernia. Kidneys normal.  Bladder non-tender and not distended.   Lymphatics: no palpable lymphadenopathy       Assessment and Plan      1. Benign prostatic hyperplasia, unspecified whether lower urinary tract symptoms present    2. Elevated PSA           Plan:      No follow-ups on file. Follow up in 3 months  Very low risk prostate cancer.           Dr. Lorelei Potter MD

## 2021-06-07 ENCOUNTER — HOSPITAL ENCOUNTER (OUTPATIENT)
Age: 61
Discharge: HOME | End: 2021-06-07
Payer: COMMERCIAL

## 2021-06-07 DIAGNOSIS — C61 PROSTATE CANCER (HCC): ICD-10-CM

## 2021-06-07 PROBLEM — J45.909 ASTHMA: Status: ACTIVE | Noted: 2021-06-07

## 2021-06-07 PROBLEM — G47.9 SLEEP DISORDER: Status: ACTIVE | Noted: 2021-06-07

## 2021-06-07 PROBLEM — E78.2 MIXED HYPERLIPIDEMIA: Status: ACTIVE | Noted: 2021-06-07

## 2021-06-07 PROBLEM — M06.00 SERONEGATIVE RHEUMATOID ARTHRITIS (HCC): Status: ACTIVE | Noted: 2021-06-07

## 2021-06-07 PROBLEM — I10 ESSENTIAL HYPERTENSION: Status: ACTIVE | Noted: 2021-06-07

## 2021-06-07 PROBLEM — M06.9 RHEUMATOID ARTHRITIS INVOLVING MULTIPLE SITES (HCC): Status: ACTIVE | Noted: 2021-06-07

## 2021-06-07 LAB — PROSTATE SPECIFIC ANTIGEN: 4.31 NG/ML (ref 0–1)

## 2021-06-07 PROCEDURE — 84153 ASSAY OF PSA TOTAL: CPT

## 2021-06-07 PROCEDURE — 36415 COLL VENOUS BLD VENIPUNCTURE: CPT

## 2021-06-14 ENCOUNTER — HOSPITAL ENCOUNTER (OUTPATIENT)
Age: 61
Discharge: HOME | End: 2021-06-14
Payer: COMMERCIAL

## 2021-06-14 DIAGNOSIS — Z13.6 SCREENING FOR ISCHEMIC HEART DISEASE: ICD-10-CM

## 2021-06-14 DIAGNOSIS — Z00.00 WELLNESS EXAMINATION: ICD-10-CM

## 2021-06-14 DIAGNOSIS — Z12.5 PROSTATE CANCER SCREENING: ICD-10-CM

## 2021-06-14 DIAGNOSIS — I10 ESSENTIAL HYPERTENSION: ICD-10-CM

## 2021-06-14 LAB
ANION GAP SERPL CALCULATED.3IONS-SCNC: 13 MEQ/L (ref 8–16)
BASOPHILS # BLD: 0.9 %
BASOPHILS ABSOLUTE: 0.1 THOU/MM3 (ref 0–0.1)
BUN BLDV-MCNC: 17 MG/DL (ref 7–22)
CALCIUM SERPL-MCNC: 9.1 MG/DL (ref 8.5–10.5)
CHLORIDE BLD-SCNC: 105 MEQ/L (ref 98–111)
CHOLESTEROL, TOTAL: 233 MG/DL (ref 100–199)
CO2: 23 MEQ/L (ref 23–33)
CREAT SERPL-MCNC: 1 MG/DL (ref 0.4–1.2)
EOSINOPHIL # BLD: 3.3 %
EOSINOPHILS ABSOLUTE: 0.2 THOU/MM3 (ref 0–0.4)
ERYTHROCYTE [DISTWIDTH] IN BLOOD BY AUTOMATED COUNT: 12.5 % (ref 11.5–14.5)
ERYTHROCYTE [DISTWIDTH] IN BLOOD BY AUTOMATED COUNT: 43.3 FL (ref 35–45)
GFR SERPL CREATININE-BSD FRML MDRD: 76 ML/MIN/1.73M2
GLUCOSE BLD-MCNC: 106 MG/DL (ref 70–108)
HCT VFR BLD CALC: 45.2 % (ref 42–52)
HDLC SERPL-MCNC: 41 MG/DL
HEMOGLOBIN: 14.4 GM/DL (ref 14–18)
IMMATURE GRANS (ABS): 0.03 THOU/MM3 (ref 0–0.07)
IMMATURE GRANULOCYTES: 0.5 %
LDL CHOLESTEROL CALCULATED: 169 MG/DL
LYMPHOCYTES # BLD: 25.2 %
LYMPHOCYTES ABSOLUTE: 1.7 THOU/MM3 (ref 1–4.8)
MCH RBC QN AUTO: 29.9 PG (ref 26–33)
MCHC RBC AUTO-ENTMCNC: 31.9 GM/DL (ref 32.2–35.5)
MCV RBC AUTO: 94 FL (ref 80–94)
MONOCYTES # BLD: 9.9 %
MONOCYTES ABSOLUTE: 0.7 THOU/MM3 (ref 0.4–1.3)
NUCLEATED RED BLOOD CELLS: 0 /100 WBC
PLATELET # BLD: 343 THOU/MM3 (ref 130–400)
PMV BLD AUTO: 10 FL (ref 9.4–12.4)
POTASSIUM SERPL-SCNC: 3.9 MEQ/L (ref 3.5–5.2)
PROSTATE SPECIFIC ANTIGEN: 4.29 NG/ML (ref 0–1)
RBC # BLD: 4.81 MILL/MM3 (ref 4.7–6.1)
SEG NEUTROPHILS: 60.2 %
SEGMENTED NEUTROPHILS ABSOLUTE COUNT: 4 THOU/MM3 (ref 1.8–7.7)
SODIUM BLD-SCNC: 141 MEQ/L (ref 135–145)
TRIGL SERPL-MCNC: 113 MG/DL (ref 0–199)
WBC # BLD: 6.7 THOU/MM3 (ref 4.8–10.8)

## 2021-06-14 PROCEDURE — 80061 LIPID PANEL: CPT

## 2021-06-14 PROCEDURE — 85025 COMPLETE CBC W/AUTO DIFF WBC: CPT

## 2021-06-14 PROCEDURE — G0103 PSA SCREENING: HCPCS

## 2021-06-14 PROCEDURE — 80048 BASIC METABOLIC PNL TOTAL CA: CPT

## 2021-06-14 PROCEDURE — 36415 COLL VENOUS BLD VENIPUNCTURE: CPT

## 2021-06-14 PROCEDURE — 86769 SARS-COV-2 COVID-19 ANTIBODY: CPT

## 2021-06-15 LAB — SARS-COV-2 ANTIBODY, TOTAL: NEGATIVE

## 2021-06-16 ENCOUNTER — OFFICE VISIT (OUTPATIENT)
Dept: UROLOGY | Age: 61
End: 2021-06-16
Payer: COMMERCIAL

## 2021-06-16 VITALS
SYSTOLIC BLOOD PRESSURE: 160 MMHG | HEIGHT: 67 IN | BODY MASS INDEX: 29.66 KG/M2 | WEIGHT: 189 LBS | DIASTOLIC BLOOD PRESSURE: 88 MMHG

## 2021-06-16 DIAGNOSIS — N40.0 BENIGN PROSTATIC HYPERPLASIA, UNSPECIFIED WHETHER LOWER URINARY TRACT SYMPTOMS PRESENT: ICD-10-CM

## 2021-06-16 DIAGNOSIS — C61 PROSTATE CANCER (HCC): Primary | ICD-10-CM

## 2021-06-16 DIAGNOSIS — R97.20 ELEVATED PSA: ICD-10-CM

## 2021-06-16 LAB
BILIRUBIN URINE: NEGATIVE
BLOOD URINE, POC: NEGATIVE
CHARACTER, URINE: CLEAR
COLOR, URINE: YELLOW
GLUCOSE URINE: NEGATIVE MG/DL
KETONES, URINE: NEGATIVE
LEUKOCYTE CLUMPS, URINE: NEGATIVE
NITRITE, URINE: NEGATIVE
PH, URINE: 6.5 (ref 5–9)
PROTEIN, URINE: NEGATIVE MG/DL
SPECIFIC GRAVITY, URINE: 1.02 (ref 1–1.03)
UROBILINOGEN, URINE: 1 EU/DL (ref 0–1)

## 2021-06-16 PROCEDURE — 81003 URINALYSIS AUTO W/O SCOPE: CPT | Performed by: UROLOGY

## 2021-06-16 PROCEDURE — 99214 OFFICE O/P EST MOD 30 MIN: CPT | Performed by: UROLOGY

## 2021-06-16 NOTE — PROGRESS NOTES
Dr. June Carrillo MD  [facility])  Urology Clinic      Patient:  Shazia Lloyd  YOB: 1960  Date: 6/16/2021    HISTORY OF PRESENT ILLNESS:   The patient is a 62 y.o. male who presents today for evaluation of the following problem(s): Elevated PSA and BPH. No family history of prostate cancer. Severe LUTS from BPH. He had prostate biopsy in late 2020 and this showed [place] 6 prostate cancer. We discussed all options with him today. Prostate was 68g on biopsy. Cysto showed severe BPH with median lobe. We proceeded with TURP. All tissues were negative for cancer. Today he is doing very well. Very happy. Overall the problem(s) : are stable. Associated Symptoms: No dysuria, gross hematuria. Pain Severity: 0/10    Summary of old records:   [place] Late 2020 in 1 core. GPS oncotype showed 23 very low risk. Imaging/Labs reviewed during today's visit:  I have independently reviewed and verified the following films during today's visit. Prostate pathology.     Last several PSA's:  Lab Results   Component Value Date    PSA 4.29 (H) 06/14/2021    PSA 4.31 (H) 06/07/2021    PSA 6.19 (H) 06/26/2020       Last total testosterone:  No results found for: TESTOSTERONE    Urinalysis today:  Results for POC orders placed in visit on 06/16/21   POCT Urinalysis No Micro (Auto)   Result Value Ref Range    Glucose, Ur Negative NEGATIVE mg/dl    Bilirubin Urine Negative     Ketones, Urine Negative NEGATIVE    Specific Gravity, Urine 1.025 1.002 - 1.030    Blood, UA POC Negative NEGATIVE    pH, Urine 6.50 5.0 - 9.0    Protein, Urine Negative NEGATIVE mg/dl    Urobilinogen, Urine 1.00 0.0 - 1.0 eu/dl    Nitrite, Urine Negative NEGATIVE    Leukocyte Clumps, Urine Negative NEGATIVE    Color, Urine Yellow YELLOW-STRAW    Character, Urine Clear [id number].CLOUD         Last BUN and creatinine:  Lab Results   Component Value Date    BUN 17 06/14/2021     Lab Results   Component Value Date    CREATININE 1.0 06/14/2021 Imaging Reviewed during this Office Visit:   (results were independently reviewed by physician and radiology report verified)    PAST MEDICAL, FAMILY AND SOCIAL HISTORY:  Past Medical History:   Diagnosis Date    Asthma     Difficult intubation     in 8/25/2020 difficult to intubate when he had cervical sx    Hypertension     Rheumatoid arthritis ([address]     Stomach ulcer      Past Surgical History:   Procedure Laterality Date    [place]    [place]     CARPAL TUNNEL RELEASE Left 03/05/2020    CERVICAL [address] SURGERY      fusion in 2005   [address] [address]  2006    fusion C3 C4 C5    TURP N/A 12/23/2020    CYSTOSCOPY TRANSURETHRAL RESECTION PROSTATE performed by Perry Dumont MD at [address] [facility] PROSTATE/TRANSRECTAL N/A 9/23/2020    CYSTOSCOPY AND TRANSRECTAL ULTRASOUND GUIDED PROSTATE BIOPSY performed by Perry Dumont MD at [facility] History   Problem Relation Age of Onset    Hypertension Mother     Esophageal Cancer Father         METS TO LIVER CA    Cancer Father         esophageal    Arthritis Son      Outpatient Medications Marked as Taking for the 6/16/21 encounter (Office Visit) with Perry Dumont MD   Medication Sig Dispense Refill    lisinopril (PRINIVIL;ZESTRIL) 20 MG tablet Take 1 tablet by mouth daily 30 tablet 11    amLODIPine (NORVASC) 5 MG tablet Take 1 tablet by mouth every evening 30 tablet 11    DULoxetine (CYMBALTA) 30 MG extended release capsule Take 1 capsule by mouth daily 7 capsule 0    DULoxetine (CYMBALTA) 60 MG extended release capsule Take 1 capsule by mouth daily 30 capsule 5    traZODone (DESYREL) 50 MG tablet Take 1 tablet by mouth nightly as needed for Sleep 30 tablet 5    celecoxib (CELEBREX) 200 MG capsule Take 1 capsule by mouth 2 times daily 60 capsule 5       Acetaminophen and Codeine  Social History     Tobacco Use   Smoking Status Former Smoker    Packs/day: 0.25    Years: 15.00    Pack years: 3.75    Quit date: 2/18/2008    Years since quittin.3   Smokeless Tobacco Never Used       Social History     Substance and Sexual Activity   Alcohol Use Never       REVIEW OF SYSTEMS:  Constitutional: negative  Eyes: negative  Respiratory: negative  Cardiovascular: negative  Gastrointestinal: negative  Musculoskeletal: negative  Genitourinary: negative except for what is in HPI  Skin: negative   Neurological: negative  Hematological/Lymphatic: negative  Psychological: negative    Physical Exam:    This a 62 y.o. male   Vitals:    21 1458   BP: (!) 160/88     Constitutional: Patient in no acute distress; Neuro: alert and oriented to person place and time. Psych: Mood and affect normal.  Skin: Normal  Lungs: Respiratory effort normal  Cardiovascular:  Normal peripheral pulses  Abdomen: Soft, non-tender, non-distended with no CVA, flank pain, hepatosplenomegaly or hernia. Kidneys normal.  Bladder non-tender and not distended. Lymphatics: no palpable lymphadenopathy       Assessment and Plan      1. Prostate cancer ([facility] [address]    2. Benign prostatic hyperplasia, unspecified whether lower urinary tract symptoms present    3. Elevated PSA           Plan:      No follow-ups on file. Doing very well. Very happy w urination  PSA is stable. Discussed repeat biopsy. Will revisit in 9 months. See back in 9 months with MRI and to discuss repeat biopsy.           Dr. Perry Dumont MD

## 2021-10-11 ENCOUNTER — TELEPHONE (OUTPATIENT)
Dept: RHEUMATOLOGY | Age: 61
End: 2021-10-11

## 2021-10-11 NOTE — TELEPHONE ENCOUNTER
Richi called in with c/o of increased pain especially in his hands. States at times has shooting pqin from hand to shoulder and neck. dolv 11/23/20. States hes been taking celebrex 200mg and 2.5 of prednisone bid since increased pain.

## 2021-10-12 NOTE — TELEPHONE ENCOUNTER
Unable to schedule at this time. Instructed to call us if increased need. UTI (urinary tract infection)

## 2021-10-27 ENCOUNTER — OFFICE VISIT (OUTPATIENT)
Dept: RHEUMATOLOGY | Age: 61
End: 2021-10-27
Payer: COMMERCIAL

## 2021-10-27 VITALS
SYSTOLIC BLOOD PRESSURE: 140 MMHG | HEIGHT: 67 IN | WEIGHT: 200 LBS | OXYGEN SATURATION: 95 % | DIASTOLIC BLOOD PRESSURE: 88 MMHG | HEART RATE: 67 BPM | BODY MASS INDEX: 31.39 KG/M2

## 2021-10-27 DIAGNOSIS — M19.90 INFLAMMATORY ARTHRITIS: ICD-10-CM

## 2021-10-27 DIAGNOSIS — M54.89 INFLAMMATORY BACK PAIN: Primary | ICD-10-CM

## 2021-10-27 DIAGNOSIS — Z51.81 MEDICATION MONITORING ENCOUNTER: ICD-10-CM

## 2021-10-27 DIAGNOSIS — M54.50 CHRONIC MIDLINE LOW BACK PAIN, UNSPECIFIED WHETHER SCIATICA PRESENT: ICD-10-CM

## 2021-10-27 DIAGNOSIS — M19.041 OSTEOARTHRITIS OF FINGERS OF HANDS, BILATERAL: ICD-10-CM

## 2021-10-27 DIAGNOSIS — M19.042 OSTEOARTHRITIS OF FINGERS OF HANDS, BILATERAL: ICD-10-CM

## 2021-10-27 DIAGNOSIS — G89.29 CHRONIC MIDLINE LOW BACK PAIN, UNSPECIFIED WHETHER SCIATICA PRESENT: ICD-10-CM

## 2021-10-27 PROCEDURE — 99214 OFFICE O/P EST MOD 30 MIN: CPT | Performed by: INTERNAL MEDICINE

## 2021-10-27 RX ORDER — PREDNISONE 1 MG/1
TABLET ORAL
Qty: 40 TABLET | Refills: 0 | Status: SHIPPED | OUTPATIENT
Start: 2021-10-27 | End: 2021-11-12

## 2021-10-27 NOTE — PROGRESS NOTES
[facility] RHEUMATOLOGY FOLLOW UP NOTE       Date Of Service: 10/27/2021  Provider: Geoff Blanca DO, DO    Name: Johny Naidu   MRN: [id number]    Chief Complaint(s)     Chief Complaint   Patient presents with    Follow-up     flare         History of Present Illness (HPI)     Johny Naidu  is a(n)61 y.o. male with a hx of  Hypertension. Hyperlipidemia  , carpal tunnel bilat, h/o b/l plantar fasciitis referred here for the evaluation of his inflammatory arthritis, osteoarthritis of multiple joints       Lost to follow up since nov. 2021. Worsening pain over the past several months , reported swelling of the hands, intermittently. Has been taking celebrex 200mg twice daily, and was taking prednisone 2.5mg twice daily. Ongoing genearlized joint pains, most severe in the hands, neck and right foot. Pain up to 7/10 over the past week. Timing: mornigns. Aggravating factors: cold , inactivity , bumping joints. Weather changes. Alleviating factors: movement,  celebrex , hot shower AM stiffness lasting ~4 hours. Inability to make a complete fistPopping of joints. + sicca,   + eye pain w/ photophobia for the past couple months. Josselyn vision changes.       Previous therapy: prednisone -  Relief, wt gain. Etodolac celebre, naproxen, ibuprofen, tramado -- sig relief. REVIEW OF SYSTEMS: (ROS)    Review of Systems  Bolded data in ROS are positive     Constitutional: , Denies :weight loss, night sweats,  persistent fever.   fatigue  Eyes:  Denies: vision changes, eye pain, eye redness, dry eyes  Ears: Denies: loss of hearing,  ringing in the ears, discharge  Nose:  Denies: frequent nose bleed,  persistent congestion,  nasal sores  Mouth: Denies: mouth sores, pain with chewing, dry mouth   Cardiac: Denies: Chest pain ,   palpitations ,  leg swelling, PND, orthopnea  Pulmonary: Denies: persistent cough,  SOB, cough, wheezing,   GI: Denies: loss of appetite, difficulty swallowing, heartburn, abdominal pain,  nausea/vomiting, diarrhea,  bloody BM's. :  Denies dysuria,  genital sores,   Neuro:  Denies: chronic headache,  memory loss, seizures, numbness,  Tingling - left leg - all the time. Endocrine: Denies: sensitivity, hair loss,  Skin: Denies: change in nails, skin rash,  photosensitivity , tightening of the skin,  Raynauds   Lymph/Heme : Denies: adenopathy , thrombosis, stroke,     PAST MEDICAL HISTORY     has a past medical history of Asthma, Difficult intubation, Hypertension, Rheumatoid arthritis ([facility] [address] and Stomach ulcer. PAST SURGICAL HISTORY     has a past surgical history that includes Neck surgery (2006); Carpal tunnel release (Left, 03/05/2020); back surgery (08/2020); Cervical disc surgery; Ultrasound Prostate/Transrectal (N/A, 9/23/2020); and TURP (N/A, 12/23/2020). Sandor Theodore FAMILY HISTORY      Family History   Problem Relation Age of Onset    Hypertension Mother     Esophageal Cancer Father         METS TO LIVER CA    Cancer Father         esophageal    Arthritis Son        SOCIAL HISTORY     reports that he quit smoking about 13 years ago. He has a 3.75 pack-year smoking history. He has never used smokeless tobacco. He reports that he does not drink alcohol and does not use drugs.     ALLERGIES     Allergies   Allergen Reactions    Acetaminophen Other (See Comments)     OCCULAR HEADACHES    Codeine      Causes headaches       CURRENT MEDICATIONS      Current Outpatient Medications:     amLODIPine (NORVASC) 5 MG tablet, Take 1 tablet by mouth every evening, Disp: 30 tablet, Rfl: 11    traZODone (DESYREL) 50 MG tablet, Take 1 tablet by mouth nightly as needed for Sleep, Disp: 30 tablet, Rfl: 5    celecoxib (CELEBREX) 200 MG capsule, Take 1 capsule by mouth 2 times daily, Disp: 60 capsule, Rfl: 5    lisinopril (PRINIVIL;ZESTRIL) 20 MG tablet, Take 1 tablet by mouth daily, Disp: 30 tablet, Rfl: 11    DULoxetine (CYMBALTA) 30 MG extended release capsule, Take 1 capsule by mouth daily, Disp: 7 capsule, Rfl: 0    DULoxetine (CYMBALTA) 60 MG extended release capsule, Take 1 capsule by mouth daily (Patient not taking: Reported on 10/27/2021), Disp: 30 capsule, Rfl: 5    PHYSICAL EXAMINATION / OBJECTIVE     Objective:  BP (!) 140/88 (Site: Left Lower Arm, Position: Sitting, Cuff Size: Medium Adult)   Pulse 67   Ht 5' 7.01\" (1.702 m)   Wt 200 lb (90.7 kg)   SpO2 95%   BMI 31.32 kg/m²     General Appearance: General appearance:  AAO x 3 ,  well-developed and well nourished  Head: normocephalic and atraumatic  Eyes: No gross abnormalities. ,  Sclera non-icteric + conjunctival injections, PERRL  Ears / nose:  normal external appearance of left and right ear and nose. No active drainage from nose. mouth:  MMM, ears w/o deformities  Neck: No jugular venous distention, appears symmetric, good ROM  Pulmonary/Chest: CTA bilateral ,  symmetric chest expansion. Cardiovascular: Normal S1 and S2, NO murmur, rub, gallop  : Deferred   Abd/GI: Deferred   Neurologic:  speech normal,   no facial drooping, no asterixis  Skin:  No rash on exposed extremities. Musculoskeletal:      Upper extremities:    Elbows - tender right torch notes. Hands - tender mcps, pips bilat. Flexion tendon tendernss and nodules in bilat hands (3-4 finger bilat) Inability to make a composite fist.  + PIP enlartment.  + dip nodules bilat. Lower extremities:    Tender MTPs, mid foot, ankles bilat. Right 1st IP joint. Swelling - right MTPs. Spine: tender lumbosacral spine. + jody testing. Exam KEY:   Tender :  T    Swelling: S,   Deformities: D,   Non-tender : NT  ,  No swelling: NS        LABS      CBC  Lab Results   Component Value Date    WBC 6.7 06/14/2021    WBC 7.3 12/30/2020    WBC 6.0 11/18/2020    RBC 4.81 06/14/2021    HGB 14.4 06/14/2021    HGB 15.2 12/30/2020    HGB 13.9 11/18/2020    HCT 45.2 06/14/2021    MCV 94.0 06/14/2021    RDW 13.9 06/26/2020     06/14/2021     12/30/2020    PLT 302 11/18/2020       CMP  Lab Results   Component Value Date    CALCIUM 9.1 06/14/2021    LABALBU 3.8 12/30/2020    PROT 6.4 12/30/2020     06/14/2021    K 3.9 06/14/2021    CO2 23 06/14/2021     06/14/2021    BUN 17 06/14/2021    CREATININE 1.0 06/14/2021    CREATININE 0.9 12/30/2020    CREATININE 0.9 11/18/2020    ALKPHOS 84 12/30/2020    ALT 27 12/30/2020    ALT 27 11/18/2020    ALT 27 09/17/2020    AST 17 12/30/2020    AST 20 11/18/2020    AST 17 09/17/2020       HgBA1c: No components found for: HGBA1C    No results found for: VITD25    Lab Results   Component Value Date    ANASCRN None Detected 02/18/2020       Lab Results   Component Value Date    SSB 0 02/18/2020     Lab Results   Component Value Date    RF < 10 02/18/2020       No components found for: CANCASCRN, APANCASCRN  Lab Results   Component Value Date    SEDRATE 7 12/30/2020     Lab Results   Component Value Date    CRP 0.23 12/30/2020         RADIOLOGY / PROCEDURES:         ASSESSMENT/PLAN    Assessment   Plan     1. Inflammatory back pain    2. Inflammatory arthritis    3. Medication monitoring encounter    4. Osteoarthritis of fingers of hands, bilateral    5. Chronic midline low back pain, unspecified whether sciatica present        1.  ? Seronegative inflammatory arthritis. / seronegative rheumatoid arthritis vs SpA                - neck back s/p inury years ago with persistent back pain and left sided radiculopathy and intermittent instability of the leg leg. Neck pain - constant, prior cervical fusions. Hand / foot pain for the past several years with progressive nodules development intermittent swelling. Father with similar hands  H/o plantar fasciitis , + erosions on PIPs, MCPs. Prior tx: Methotrexate may 2020 (nausea, gi upset) , Prednisone taper (significant relief)     -  cont celebrex 100mg bid. -  Mri pelvis b/c inflammatory back pain, MRI with mild enplate edema and fatty infiltration in lumbar spine on prior scan.    - Start arava 10mg daily b/c worsening joint pains. --- ?  anti-TNF. . (10/27/2021)               2. Osteoarthritis of fingers of hands, bilateral               - celebrex 200mg bid.      3. Chronic bilateral low back pain with left-sided sciatica  4. Moderate severe spinal stenosis  -- s/p lumbar spine surgery Aug 2020. XR lumbar spine with scoliosis, DJD and facet arthritis. ? SI joint change on right per my read. - laminectomy Aug 2020 w/ resoluation of radicular symptoms. 5. Bilateral carpal tunnel syndrome  -- intermittent - right. tinel bilat wrist and elbows, right left CTS release.                - request [facility] records     6. Med monitoring: cbc, cmp q 4 weeks. X 3     Return in about 2 months (around 12/27/2021). Electronically signed by Ashley Ferreira DO on 10/27/2021 at 3:39 PM    New Prescriptions    No medications on file       Thank you for allowing me to participate in the care of this patient. Please call if there are any questions.

## 2021-10-28 ENCOUNTER — HOSPITAL ENCOUNTER (OUTPATIENT)
Age: 61
Discharge: HOME | End: 2021-10-28
Payer: COMMERCIAL

## 2021-10-28 DIAGNOSIS — Z51.81 MEDICATION MONITORING ENCOUNTER: ICD-10-CM

## 2021-10-28 DIAGNOSIS — M19.90 INFLAMMATORY ARTHRITIS: ICD-10-CM

## 2021-10-28 DIAGNOSIS — M54.89 INFLAMMATORY BACK PAIN: ICD-10-CM

## 2021-10-28 LAB
ALBUMIN SERPL-MCNC: 4.3 G/DL (ref 3.5–5.1)
ALP BLD-CCNC: 97 U/L (ref 38–126)
ALT SERPL-CCNC: 22 U/L (ref 11–66)
ANION GAP SERPL CALCULATED.3IONS-SCNC: 12 MEQ/L (ref 8–16)
AST SERPL-CCNC: 20 U/L (ref 5–40)
BASOPHILS # BLD: 0.2 %
BASOPHILS ABSOLUTE: 0 THOU/MM3 (ref 0–0.1)
BILIRUB SERPL-MCNC: 0.3 MG/DL (ref 0.3–1.2)
BUN BLDV-MCNC: 15 MG/DL (ref 7–22)
C-REACTIVE PROTEIN: 0.6 MG/DL (ref 0–1)
CALCIUM SERPL-MCNC: 9.7 MG/DL (ref 8.5–10.5)
CHLORIDE BLD-SCNC: 104 MEQ/L (ref 98–111)
CO2: 24 MEQ/L (ref 23–33)
CREAT SERPL-MCNC: 0.8 MG/DL (ref 0.4–1.2)
EOSINOPHIL # BLD: 0.2 %
EOSINOPHILS ABSOLUTE: 0 THOU/MM3 (ref 0–0.4)
ERYTHROCYTE [DISTWIDTH] IN BLOOD BY AUTOMATED COUNT: 13 % (ref 11.5–14.5)
ERYTHROCYTE [DISTWIDTH] IN BLOOD BY AUTOMATED COUNT: 43.8 FL (ref 35–45)
GFR SERPL CREATININE-BSD FRML MDRD: > 90 ML/MIN/1.73M2
GLUCOSE BLD-MCNC: 130 MG/DL (ref 70–108)
HCT VFR BLD CALC: 45.6 % (ref 42–52)
HEMOGLOBIN: 14.9 GM/DL (ref 14–18)
IMMATURE GRANS (ABS): 0.04 THOU/MM3 (ref 0–0.07)
IMMATURE GRANULOCYTES: 0.4 %
LYMPHOCYTES # BLD: 10.9 %
LYMPHOCYTES ABSOLUTE: 1 THOU/MM3 (ref 1–4.8)
MCH RBC QN AUTO: 30.5 PG (ref 26–33)
MCHC RBC AUTO-ENTMCNC: 32.7 GM/DL (ref 32.2–35.5)
MCV RBC AUTO: 93.3 FL (ref 80–94)
MONOCYTES # BLD: 4.8 %
MONOCYTES ABSOLUTE: 0.5 THOU/MM3 (ref 0.4–1.3)
NUCLEATED RED BLOOD CELLS: 0 /100 WBC
PLATELET # BLD: 377 THOU/MM3 (ref 130–400)
PMV BLD AUTO: 10.2 FL (ref 9.4–12.4)
POTASSIUM SERPL-SCNC: 4.4 MEQ/L (ref 3.5–5.2)
RBC # BLD: 4.89 MILL/MM3 (ref 4.7–6.1)
SEDIMENTATION RATE, ERYTHROCYTE: 6 MM/HR (ref 0–10)
SEG NEUTROPHILS: 83.5 %
SEGMENTED NEUTROPHILS ABSOLUTE COUNT: 8 THOU/MM3 (ref 1.8–7.7)
SODIUM BLD-SCNC: 140 MEQ/L (ref 135–145)
TOTAL PROTEIN: 6.9 G/DL (ref 6.1–8)
WBC # BLD: 9.6 THOU/MM3 (ref 4.8–10.8)

## 2021-10-28 PROCEDURE — 85651 RBC SED RATE NONAUTOMATED: CPT

## 2021-10-28 PROCEDURE — 36415 COLL VENOUS BLD VENIPUNCTURE: CPT

## 2021-10-28 PROCEDURE — 86480 TB TEST CELL IMMUN MEASURE: CPT

## 2021-10-28 PROCEDURE — 85025 COMPLETE CBC W/AUTO DIFF WBC: CPT

## 2021-10-28 PROCEDURE — 86140 C-REACTIVE PROTEIN: CPT

## 2021-10-28 PROCEDURE — 80053 COMPREHEN METABOLIC PANEL: CPT

## 2021-11-01 LAB
QUANTI TB GOLD PLUS: NEGATIVE
QUANTI TB1 MINUS NIL: 0 IU/ML (ref 0–0.34)
QUANTI TB2 MINUS NIL: 0 IU/ML (ref 0–0.34)
QUANTIFERON MITOGEN MINUS NIL: 8.18 IU/ML
QUANTIFERON NIL: 0.02 IU/ML

## 2021-11-15 ENCOUNTER — TELEPHONE (OUTPATIENT)
Dept: UROLOGY | Age: 61
End: 2021-11-15

## 2021-11-15 NOTE — TELEPHONE ENCOUNTER
Patient scheduled for MRI OF PROSTATE WITH AND WITHOUT CONTRAST  at [place] [address] on 03/23/2022 WITH ARRIVAL TIME OF 0830. Patient advised of instructions THERE IS NO PREP.   Order mailed to patient

## 2021-11-19 ENCOUNTER — OFFICE VISIT (OUTPATIENT)
Dept: RHEUMATOLOGY | Age: 61
End: 2021-11-19
Payer: COMMERCIAL

## 2021-11-19 VITALS
OXYGEN SATURATION: 96 % | SYSTOLIC BLOOD PRESSURE: 160 MMHG | DIASTOLIC BLOOD PRESSURE: 80 MMHG | WEIGHT: 205.2 LBS | HEART RATE: 71 BPM | HEIGHT: 67 IN | BODY MASS INDEX: 32.21 KG/M2

## 2021-11-19 DIAGNOSIS — M19.042 OSTEOARTHRITIS OF FINGERS OF HANDS, BILATERAL: ICD-10-CM

## 2021-11-19 DIAGNOSIS — M54.89 INFLAMMATORY BACK PAIN: ICD-10-CM

## 2021-11-19 DIAGNOSIS — M54.50 CHRONIC MIDLINE LOW BACK PAIN, UNSPECIFIED WHETHER SCIATICA PRESENT: ICD-10-CM

## 2021-11-19 DIAGNOSIS — M19.041 OSTEOARTHRITIS OF FINGERS OF HANDS, BILATERAL: ICD-10-CM

## 2021-11-19 DIAGNOSIS — M19.90 INFLAMMATORY ARTHRITIS: Primary | ICD-10-CM

## 2021-11-19 DIAGNOSIS — G89.29 CHRONIC MIDLINE LOW BACK PAIN, UNSPECIFIED WHETHER SCIATICA PRESENT: ICD-10-CM

## 2021-11-19 DIAGNOSIS — M15.9 OSTEOARTHRITIS OF MULTIPLE JOINTS, UNSPECIFIED OSTEOARTHRITIS TYPE: ICD-10-CM

## 2021-11-19 DIAGNOSIS — Z51.81 MEDICATION MONITORING ENCOUNTER: ICD-10-CM

## 2021-11-19 PROCEDURE — 99214 OFFICE O/P EST MOD 30 MIN: CPT | Performed by: NURSE PRACTITIONER

## 2021-11-19 RX ORDER — LEFLUNOMIDE 10 MG/1
10 TABLET ORAL DAILY
Qty: 30 TABLET | Refills: 0 | Status: SHIPPED | OUTPATIENT
Start: 2021-11-19 | End: 2021-12-22 | Stop reason: SDUPTHER

## 2021-11-19 ASSESSMENT — ENCOUNTER SYMPTOMS
DIARRHEA: 0
CONSTIPATION: 0
EYE ITCHING: 0
EYE PAIN: 0
NAUSEA: 0
ABDOMINAL PAIN: 0
BACK PAIN: 1
TROUBLE SWALLOWING: 0
COUGH: 0
SHORTNESS OF BREATH: 1

## 2021-11-19 NOTE — PROGRESS NOTES
[facility] RHEUMATOLOGY FOLLOW UP NOTE       Date Of Service: 11/19/2021  Provider: RENATO Hernadez - PAT    Name: Yuni Ball   MRN: [id number]    Chief Complaint(s)     Chief Complaint   Patient presents with    Follow-up     2 month         History of Present Illness (HPI)     Yuni Ball  is a(n)61 y.o. male with a hx of HTN, HLD, carpal tunnel bilat, h/o b/l plantar fasciitis here for the f/u evaluation of inflammatory arthritis, osteoarthritis. Interval hx:    - relief with prednisone   - did not start arava- never receive prescription   - would like to wait until next year to have MRI pelvis completed    pain affecting the fingers, wrists, elbows, shoulder,s hups, knees, ankles, toes, neck, back  Pain on a scale 0-10: 6/10  Type of pain: intermittent  Timing: mornings  Aggravating factors: cold, inactivity, bumping joints, weather changes  Alleviating factors: movement, celebrex, hot shower    Associated symptoms:  + swelling/  Redness/ warmth \"everywhere\", + AM stiffness lasting ~ 2 hours      REVIEW OF SYSTEMS: (ROS)    Review of Systems   Constitutional: Positive for fatigue. Negative for fever and unexpected weight change. HENT: Positive for tinnitus. Negative for congestion and trouble swallowing. Eyes: Negative for pain and itching. Respiratory: Positive for shortness of breath. Negative for cough. Cardiovascular: Negative for chest pain and leg swelling. Gastrointestinal: Negative for abdominal pain, constipation, diarrhea and nausea. Endocrine: Negative for cold intolerance and heat intolerance. Genitourinary: Negative for difficulty urinating, frequency and urgency. Musculoskeletal: Positive for arthralgias, back pain, myalgias and neck pain. Negative for joint swelling. Skin: Negative for rash. Neurological: Positive for weakness and headaches. Negative for dizziness and numbness. Psychiatric/Behavioral: Positive for sleep disturbance.  The patient is nervous/anxious.         PAST MEDICAL HISTORY      Past Medical History:   Diagnosis Date    Asthma     Difficult intubation     in 8/25/2020 difficult to intubate when he had cervical sx    Hypertension     Rheumatoid arthritis ([address]     Stomach ulcer        PAST SURGICAL HISTORY      Past Surgical History:   Procedure Laterality Date    [place]    [place]     CARPAL TUNNEL RELEASE Left 03/05/2020    CERVICAL [address] SURGERY      fusion in 2005   [address] [address]  2006    fusion C3 C4 C5    TURP N/A 12/23/2020    CYSTOSCOPY TRANSURETHRAL RESECTION PROSTATE performed by Yasmani Rodrigues MD at [address] [facility] PROSTATE/TRANSRECTAL N/A 9/23/2020    CYSTOSCOPY AND TRANSRECTAL ULTRASOUND GUIDED PROSTATE BIOPSY performed by Yasmani Rodrigues MD at [facility]      Family History   Problem Relation Age of Onset    Hypertension Mother     Esophageal Cancer Father         METS TO LIVER CA    Cancer Father         esophageal    Arthritis Son        SOCIAL HISTORY      Social History     Tobacco History     Smoking Status  Former Smoker Quit date  2/18/2008 Smoking Frequency  0.25 packs/day for 15 years (3.75 pk yrs)    Smokeless Tobacco Use  Never Used          Alcohol History     Alcohol Use Status  Never          Drug Use     Drug Use Status  Never          Sexual Activity     Sexually Active  Not Asked Comment   monogamous                ALLERGIES     Allergies   Allergen Reactions    Acetaminophen Other (See Comments)     OCCULAR HEADACHES    Codeine      Causes headaches       CURRENT MEDICATIONS      Current Outpatient Medications   Medication Sig Dispense Refill    lisinopril (PRINIVIL;ZESTRIL) 20 MG tablet Take 1 tablet by mouth daily 30 tablet 11    amLODIPine (NORVASC) 5 MG tablet Take 1 tablet by mouth every evening 30 tablet 11    traZODone (DESYREL) 50 MG tablet Take 1 tablet by mouth nightly as needed for Sleep 30 tablet 5    celecoxib (CELEBREX) 200 MG capsule Take 1 capsule by mouth 2 times daily 60 capsule 5     No current facility-administered medications for this visit. PHYSICAL EXAMINATION / OBJECTIVE   Objective:  BP (!) 160/80 (Site: Left Upper Arm, Position: Sitting, Cuff Size: Medium Adult)   Pulse 71   Ht 5' 7.01\" (1.702 m)   Wt 205 lb 3.2 oz (93.1 kg)   SpO2 96%   BMI 32.13 kg/m²     Physical Exam  Vitals reviewed. Constitutional:       Appearance: He is well-developed. Cardiovascular:      Rate and Rhythm: Normal rate and regular rhythm. Pulmonary:      Effort: Pulmonary effort is normal.      Breath sounds: Normal breath sounds. Musculoskeletal:      Cervical back: Normal range of motion and neck supple. Skin:     General: Skin is warm and dry. Findings: No rash. Neurological:      Mental Status: He is alert and oriented to person, place, and time. Deep Tendon Reflexes: Reflexes are normal and symmetric. Psychiatric:         Thought Content:  Thought content normal.       Upper extremities:    SHOULDERS nontender ,   ELBOWS tender right,   WRISTS nontender,   HANDS/FINGERS    MCPs tender bilat    PIPs tender bilat   Unable to make composite fist, + PIP enlargement and DIP nodules    Lower extremities:  HIPS tender left  KNEES tender bilat  ANKLES tender bilat   FEET : tender and swelling right     Spine:   Tender lumbosacral spine       LABS    CBC  Lab Results   Component Value Date    WBC 9.6 10/28/2021    RBC 4.89 10/28/2021    HGB 14.9 10/28/2021    HCT 45.6 10/28/2021    MCV 93.3 10/28/2021    MCH 30.5 10/28/2021    MCHC 32.7 10/28/2021    RDW 13.9 06/26/2020     10/28/2021       CMP  Lab Results   Component Value Date    CALCIUM 9.7 10/28/2021    LABALBU 4.3 10/28/2021    PROT 6.9 10/28/2021     10/28/2021    K 4.4 10/28/2021    CO2 24 10/28/2021     10/28/2021    BUN 15 10/28/2021    CREATININE 0.8 10/28/2021    ALKPHOS 97 10/28/2021    ALT 22 10/28/2021    AST 20 10/28/2021       HgBA1c: No

## 2021-12-17 ENCOUNTER — HOSPITAL ENCOUNTER (OUTPATIENT)
Age: 61
Discharge: HOME | End: 2021-12-17

## 2021-12-21 ENCOUNTER — NURSE ONLY (OUTPATIENT)
Dept: LAB | Age: 61
End: 2021-12-21

## 2021-12-21 DIAGNOSIS — Z51.81 MEDICATION MONITORING ENCOUNTER: ICD-10-CM

## 2021-12-21 LAB
BASOPHILS # BLD: 1.1 %
BASOPHILS ABSOLUTE: 0.1 THOU/MM3 (ref 0–0.1)
EOSINOPHIL # BLD: 3.4 %
EOSINOPHILS ABSOLUTE: 0.2 THOU/MM3 (ref 0–0.4)
ERYTHROCYTE [DISTWIDTH] IN BLOOD BY AUTOMATED COUNT: 12.2 % (ref 11.5–14.5)
ERYTHROCYTE [DISTWIDTH] IN BLOOD BY AUTOMATED COUNT: 40.7 FL (ref 35–45)
HCT VFR BLD CALC: 43 % (ref 42–52)
HEMOGLOBIN: 14.8 GM/DL (ref 14–18)
IMMATURE GRANS (ABS): 0.03 THOU/MM3 (ref 0–0.07)
IMMATURE GRANULOCYTES: 0.4 %
LYMPHOCYTES # BLD: 26.4 %
LYMPHOCYTES ABSOLUTE: 1.9 THOU/MM3 (ref 1–4.8)
MCH RBC QN AUTO: 31.4 PG (ref 26–33)
MCHC RBC AUTO-ENTMCNC: 34.4 GM/DL (ref 32.2–35.5)
MCV RBC AUTO: 91.1 FL (ref 80–94)
MONOCYTES # BLD: 8.9 %
MONOCYTES ABSOLUTE: 0.6 THOU/MM3 (ref 0.4–1.3)
NUCLEATED RED BLOOD CELLS: 0 /100 WBC
PLATELET # BLD: 374 THOU/MM3 (ref 130–400)
PMV BLD AUTO: 9.9 FL (ref 9.4–12.4)
RBC # BLD: 4.72 MILL/MM3 (ref 4.7–6.1)
SEG NEUTROPHILS: 59.8 %
SEGMENTED NEUTROPHILS ABSOLUTE COUNT: 4.2 THOU/MM3 (ref 1.8–7.7)
WBC # BLD: 7.1 THOU/MM3 (ref 4.8–10.8)

## 2021-12-22 LAB
ALBUMIN SERPL-MCNC: 4.2 G/DL (ref 3.5–5.1)
ALP BLD-CCNC: 95 U/L (ref 38–126)
ALT SERPL-CCNC: 30 U/L (ref 11–66)
ANION GAP SERPL CALCULATED.3IONS-SCNC: 11 MEQ/L (ref 8–16)
AST SERPL-CCNC: 25 U/L (ref 5–40)
BILIRUB SERPL-MCNC: 0.2 MG/DL (ref 0.3–1.2)
BUN BLDV-MCNC: 13 MG/DL (ref 7–22)
C-REACTIVE PROTEIN: < 0.3 MG/DL (ref 0–1)
CALCIUM SERPL-MCNC: 9.3 MG/DL (ref 8.5–10.5)
CHLORIDE BLD-SCNC: 102 MEQ/L (ref 98–111)
CO2: 25 MEQ/L (ref 23–33)
CREAT SERPL-MCNC: 0.8 MG/DL (ref 0.4–1.2)
GFR SERPL CREATININE-BSD FRML MDRD: > 90 ML/MIN/1.73M2
GLUCOSE BLD-MCNC: 85 MG/DL (ref 70–108)
POTASSIUM SERPL-SCNC: 4.3 MEQ/L (ref 3.5–5.2)
SEDIMENTATION RATE, ERYTHROCYTE: 6 MM/HR (ref 0–10)
SODIUM BLD-SCNC: 138 MEQ/L (ref 135–145)
TOTAL PROTEIN: 6.7 G/DL (ref 6.1–8)

## 2021-12-22 RX ORDER — LEFLUNOMIDE 10 MG/1
10 TABLET ORAL DAILY
Qty: 30 TABLET | Refills: 0 | Status: SHIPPED | OUTPATIENT
Start: 2021-12-22 | End: 2022-01-04

## 2021-12-27 ENCOUNTER — PATIENT MESSAGE (OUTPATIENT)
Dept: RHEUMATOLOGY | Age: 61
End: 2021-12-27

## 2021-12-27 NOTE — TELEPHONE ENCOUNTER
From: Nayana Born  To: Rachana Sharon  Sent: 12/27/2021 12:25 PM EST  Subject: Question regarding CBC W/ AUTO DIFFERENTIAL    Where was the prescription for [address] sent?

## 2022-01-04 ENCOUNTER — PATIENT MESSAGE (OUTPATIENT)
Dept: RHEUMATOLOGY | Age: 62
End: 2022-01-04

## 2022-01-04 DIAGNOSIS — Z51.81 MEDICATION MONITORING ENCOUNTER: ICD-10-CM

## 2022-01-04 DIAGNOSIS — M19.90 INFLAMMATORY ARTHRITIS: Primary | ICD-10-CM

## 2022-01-04 DIAGNOSIS — M06.00 SERONEGATIVE RHEUMATOID ARTHRITIS (HCC): Primary | ICD-10-CM

## 2022-01-04 RX ORDER — LEFLUNOMIDE 20 MG/1
20 TABLET ORAL DAILY
Qty: 30 TABLET | Refills: 0 | Status: SHIPPED | OUTPATIENT
Start: 2022-01-04 | End: 2022-03-31 | Stop reason: SDUPTHER

## 2022-01-04 NOTE — TELEPHONE ENCOUNTER
From: Maisha Bermudez  To: Dr. Shabnam Desouza: 1/4/2022 12:12 PM EST  Subject: Joint pain     Is there anything you can prescribe to help alleviate the extreme flare up pain that's relentlessly disabiling me? I'm already taking 2 celebrex daily and several Tylenol and still no relief. Please advise, I'm very frustrated.

## 2022-01-05 RX ORDER — PREDNISONE 1 MG/1
5 TABLET ORAL DAILY
Qty: 30 TABLET | Refills: 1 | Status: SHIPPED | OUTPATIENT
Start: 2022-01-05 | End: 2022-05-03 | Stop reason: SDUPTHER

## 2022-03-10 ENCOUNTER — HOSPITAL ENCOUNTER (OUTPATIENT)
Dept: MRI IMAGING | Age: 62
Discharge: HOME | End: 2022-03-10
Payer: COMMERCIAL

## 2022-03-10 DIAGNOSIS — C61 PROSTATE CANCER (HCC): ICD-10-CM

## 2022-03-10 DIAGNOSIS — M54.89 INFLAMMATORY BACK PAIN: ICD-10-CM

## 2022-03-10 DIAGNOSIS — M19.90 INFLAMMATORY ARTHRITIS: ICD-10-CM

## 2022-03-10 LAB — POC CREATININE WHOLE BLOOD: 1 MG/DL (ref 0.5–1.2)

## 2022-03-10 PROCEDURE — 76377 3D RENDER W/INTRP POSTPROCES: CPT

## 2022-03-10 PROCEDURE — 6360000004 HC RX CONTRAST MEDICATION: Performed by: UROLOGY

## 2022-03-10 PROCEDURE — 72195 MRI PELVIS W/O DYE: CPT

## 2022-03-10 PROCEDURE — A9579 GAD-BASE MR CONTRAST NOS,1ML: HCPCS | Performed by: UROLOGY

## 2022-03-10 PROCEDURE — 82565 ASSAY OF CREATININE: CPT

## 2022-03-10 RX ADMIN — GADOTERIDOL 15 ML: 279.3 INJECTION, SOLUTION INTRAVENOUS at 11:51

## 2022-03-21 ENCOUNTER — HOSPITAL ENCOUNTER (OUTPATIENT)
Dept: MRI IMAGING | Age: 62
Discharge: HOME | End: 2022-03-21
Payer: COMMERCIAL

## 2022-03-21 DIAGNOSIS — G89.29 CHRONIC LEFT-SIDED THORACIC BACK PAIN: ICD-10-CM

## 2022-03-21 DIAGNOSIS — M54.6 CHRONIC LEFT-SIDED THORACIC BACK PAIN: ICD-10-CM

## 2022-03-21 PROCEDURE — 72146 MRI CHEST SPINE W/O DYE: CPT

## 2022-03-22 ENCOUNTER — OFFICE VISIT (OUTPATIENT)
Dept: RHEUMATOLOGY | Age: 62
End: 2022-03-22
Payer: COMMERCIAL

## 2022-03-22 VITALS
WEIGHT: 198.4 LBS | SYSTOLIC BLOOD PRESSURE: 136 MMHG | OXYGEN SATURATION: 96 % | HEIGHT: 67 IN | DIASTOLIC BLOOD PRESSURE: 86 MMHG | HEART RATE: 76 BPM | BODY MASS INDEX: 31.14 KG/M2

## 2022-03-22 DIAGNOSIS — M06.00 SERONEGATIVE RHEUMATOID ARTHRITIS (HCC): Primary | ICD-10-CM

## 2022-03-22 DIAGNOSIS — M54.50 CHRONIC MIDLINE LOW BACK PAIN, UNSPECIFIED WHETHER SCIATICA PRESENT: ICD-10-CM

## 2022-03-22 DIAGNOSIS — Z51.81 MEDICATION MONITORING ENCOUNTER: ICD-10-CM

## 2022-03-22 DIAGNOSIS — G89.29 CHRONIC MIDLINE LOW BACK PAIN, UNSPECIFIED WHETHER SCIATICA PRESENT: ICD-10-CM

## 2022-03-22 DIAGNOSIS — M19.041 OSTEOARTHRITIS OF FINGERS OF HANDS, BILATERAL: ICD-10-CM

## 2022-03-22 DIAGNOSIS — M19.042 OSTEOARTHRITIS OF FINGERS OF HANDS, BILATERAL: ICD-10-CM

## 2022-03-22 DIAGNOSIS — M19.90 INFLAMMATORY ARTHRITIS: ICD-10-CM

## 2022-03-22 PROCEDURE — 99214 OFFICE O/P EST MOD 30 MIN: CPT | Performed by: INTERNAL MEDICINE

## 2022-03-22 RX ORDER — TRAMADOL HYDROCHLORIDE 50 MG/1
50 TABLET ORAL EVERY 6 HOURS PRN
COMMUNITY
End: 2022-04-05 | Stop reason: SDUPTHER

## 2022-03-22 RX ORDER — CELECOXIB 100 MG/1
100 CAPSULE ORAL 2 TIMES DAILY
Qty: 60 CAPSULE | Refills: 3 | Status: SHIPPED | OUTPATIENT
Start: 2022-03-22 | End: 2022-09-21 | Stop reason: SDUPTHER

## 2022-03-22 ASSESSMENT — ENCOUNTER SYMPTOMS
EYE PAIN: 0
ABDOMINAL PAIN: 1
BACK PAIN: 1
SHORTNESS OF BREATH: 1
EYE ITCHING: 0
TROUBLE SWALLOWING: 0
NAUSEA: 0
CONSTIPATION: 0
COUGH: 0
DIARRHEA: 0

## 2022-03-22 NOTE — PROGRESS NOTES
[facility] RHEUMATOLOGY FOLLOW UP NOTE       Date Of Service: 3/22/2022  Provider: Guerline Olivas DO    Name: Alice Sanchez   MRN: [id number]    Chief Complaint(s)     Chief Complaint   Patient presents with    Follow-up     4 month         History of Present Illness (HPI)     Alice Sanchez  is a(n)61 y.o. male with a hx of HTN, HLD, carpal tunnel bilat, h/o b/l plantar fasciitis here for the f/u evaluation of inflammatory arthritis, osteoarthritis. Interval hx: off Arava x 2 months. , cont. Prednisone 5mg daily   Ongoing continued flares with stabbing pain through the knuckles. Pain currently generalized pain affecting the fingers, wrists, elbows, shoulder,s hups, knees, ankles, toes, neck, back . Pain up to 7.5/10 over past week. Type of pain: intermittent  Timing: mornings  Aggravating factors: cold, inactivity, bumping joints, weather changes  Alleviating factors: movement, celebrex, hot shower  + swelling bilat hands. + AM stiffness lasting ~ 2 hours  INCREASED Numbness of the left leg for the past couple months. REVIEW OF SYSTEMS: (ROS)    Review of Systems   Constitutional: Positive for fatigue. Negative for fever and unexpected weight change. HENT: Positive for tinnitus. Negative for congestion and trouble swallowing. Eyes: Negative for pain and itching. Respiratory: Positive for shortness of breath. Negative for cough. Cardiovascular: Negative for chest pain and leg swelling. Gastrointestinal: Positive for abdominal pain (w/ celebrex. ). Negative for constipation, diarrhea and nausea. Endocrine: Negative for cold intolerance and heat intolerance. Genitourinary: Negative for difficulty urinating, frequency and urgency. Musculoskeletal: Positive for arthralgias, back pain, myalgias and neck pain. Negative for joint swelling. Skin: Negative for rash. Neurological: Positive for weakness and headaches. Negative for dizziness and numbness.    Psychiatric/Behavioral: Positive for sleep disturbance. The patient is nervous/anxious. PAST MEDICAL HISTORY      Past Medical History:   Diagnosis Date    Asthma     Difficult intubation     in 8/25/2020 difficult to intubate when he had cervical sx    Hypertension     Rheumatoid arthritis ([address]     Stomach ulcer        PAST SURGICAL HISTORY      Past Surgical History:   Procedure Laterality Date    [place]    [place]     CARPAL TUNNEL RELEASE Left 03/05/2020    CERVICAL [address] SURGERY      fusion in 2005   [address] [address]  2006    fusion C3 C4 C5    TURP N/A 12/23/2020    CYSTOSCOPY TRANSURETHRAL RESECTION PROSTATE performed by Katherleen Ormond, MD at [address] [facility] PROSTATE/TRANSRECTAL N/A 9/23/2020    CYSTOSCOPY AND TRANSRECTAL ULTRASOUND GUIDED PROSTATE BIOPSY performed by Katherleen Ormond, MD at [address] [facility]      Family History   Problem Relation Age of Onset    Hypertension Mother     Esophageal Cancer Father         METS TO LIVER CA    Cancer Father         esophageal    Arthritis Son        SOCIAL HISTORY      Social History     Tobacco History     Smoking Status  Former Smoker Quit date  2/18/2008 Smoking Frequency  0.25 packs/day for 15 years (3.75 pk yrs)    Smokeless Tobacco Use  Never Used          Alcohol History     Alcohol Use Status  Never          Drug Use     Drug Use Status  Never          Sexual Activity     Sexually Active  Not Asked Comment   monogamous                ALLERGIES     Allergies   Allergen Reactions    Acetaminophen Other (See Comments)     OCCULAR HEADACHES    Codeine      Causes headaches       CURRENT MEDICATIONS      Current Outpatient Medications   Medication Sig Dispense Refill    traMADol (ULTRAM) 50 MG tablet Take 50 mg by mouth every 6 hours as needed for Pain.       celecoxib (CELEBREX) 100 MG capsule Take 1 capsule by mouth 2 times daily 60 capsule 3    traZODone (DESYREL) 50 MG tablet Take 1 tablet by mouth nightly 30 tablet 4    predniSONE (DELTASONE) 5 MG tablet Take 1 tablet by mouth daily 30 tablet 1    leflunomide (ARAVA) 20 MG tablet Take 1 tablet by mouth daily (Patient not taking: Reported on 3/22/2022) 30 tablet 0    lisinopril (PRINIVIL;ZESTRIL) 20 MG tablet Take 1 tablet by mouth daily 30 tablet 11    amLODIPine (NORVASC) 5 MG tablet Take 1 tablet by mouth every evening 30 tablet 11     No current facility-administered medications for this visit. PHYSICAL EXAMINATION / OBJECTIVE   Objective:  /86 (Site: Left Upper Arm, Position: Sitting, Cuff Size: Medium Adult)   Pulse 76   Ht 5' 7.01\" (1.702 m)   Wt 198 lb 6.4 oz (90 kg)   SpO2 96%   BMI 31.07 kg/m²     Physical Exam  Vitals reviewed. Constitutional:       Appearance: He is well-developed. Cardiovascular:      Rate and Rhythm: Normal rate and regular rhythm. Pulmonary:      Effort: Pulmonary effort is normal.      Breath sounds: Normal breath sounds. Musculoskeletal:      Cervical back: Normal range of motion and neck supple. Skin:     General: Skin is warm and dry. Findings: No rash. Neurological:      Mental Status: He is alert and oriented to person, place, and time. Deep Tendon Reflexes: Babinski sign absent on the right side. Babinski sign absent on the left side. Reflex Scores:       Patellar reflexes are 2+ on the right side and 0 on the left side. Achilles reflexes are 2+ on the right side and 2+ on the left side. Psychiatric:         Thought Content:  Thought content normal.       Upper extremities:    SHOULDERS nontender ,   ELBOWS Non-tender   WRISTS  --- tender left w/ painful ROM   HANDS/FINGERS    MCPs tender bilat    PIPs tender bilat   Unable to make composite fist, + PIP enlargement and DIP nodules    Lower extremities:  HIPS tender left  KNEES nt  ANKLES Non-tender   FEET : tender bilat MTPs, and swelling right     Spine:   Tender lumbosacral spine       LABS    CBC  Lab Results   Component Value Date    WBC 7.1 12/21/2021    RBC 4.72 12/21/2021    HGB 14.8 12/21/2021    HCT 43.0 12/21/2021    MCV 91.1 12/21/2021    MCH 31.4 12/21/2021    MCHC 34.4 12/21/2021    RDW 13.9 06/26/2020     12/21/2021       CMP  Lab Results   Component Value Date    CALCIUM 9.3 12/21/2021    LABALBU 4.2 12/21/2021    PROT 6.7 12/21/2021     12/21/2021    K 4.3 12/21/2021    CO2 25 12/21/2021     12/21/2021    BUN 13 12/21/2021    CREATININE 0.8 12/21/2021    ALKPHOS 95 12/21/2021    ALT 30 12/21/2021    AST 25 12/21/2021       Lab Results   Component Value Date    ANASCRN None Detected 02/18/2020     No results found for: SSA  Lab Results   Component Value Date    SSB 0 02/18/2020       Lab Results   Component Value Date    RF < 10 02/18/2020       No components found for: CANCASCRN, APANCASCRN  Lab Results   Component Value Date    SEDRATE 6 12/21/2021     Lab Results   Component Value Date    CRP < 0.30 12/21/2021       RADIOLOGY:         ASSESSMENT/PLAN    Assessment   Plan     Seronegative rheumatoid arthritis vs SpA  - h/o plantar fasciitis, + erosions on PIPs, MCPs, inflammatory back pain. - prior tx: methotrexate (nausea, GI upset), prednisone (sig relief)   - Restart Arava 20 mg PO daily if labs are stable    - if not relief in 6-8 weeks will pursue anti-TNF therapy     Osteoarthritis of hands   - decrease celebrex 100 mg TWICE DAILY b/c gi upset. Chronic bilateral ow back pain with left sided sciatica - increased numbness in theleft leg. Moderate severe spinal stenosis s/p lumbar surgery aug 2020  Laminectomy aug 2020 w/ resolution of radicular symptoms. Rometta Favre + foraminl stenosis of the thoracic and lumbar spine. - MRI pelvis w/ w/o sacroiliitis. MRI lumbar and throacic spine with  mild endplate edema and fatty infiltration in lumbar spine, thoracoc spine, foraminal stenosis. --- decreased DTR of left patellar, intact muscle strenght bilat lower extremities.  - sx's concern for worsening L4 radiculopathy given the numbness down the leg and decreased DTR's. --- asked patient to follow up with NSGY. Discussed phys therapy. Bilateral carpal tunnel syndrome - + tinel wrists and elbows. S/p CTS release    Medication monitoring   - CBC, CMP, sed rate, CRP q 4 weeks x3 w/ arava titration       Return in about 2 months (around 5/22/2022). Electronically signed by Rafael Gerard DO on 3/22/2022 at 4:45 PM    New Prescriptions    No medications on file       Thank you for allowing me to participate in the care of this patient. Please call if there are any questions.

## 2022-03-24 LAB
ABSOLUTE BASO #: 0.1 X10E9/L (ref 0–0.2)
ABSOLUTE EOS #: 0.2 X10E9/L (ref 0–0.4)
ABSOLUTE LYMPH #: 1.5 X10E9/L (ref 1–3.5)
ABSOLUTE MONO #: 0.5 X10E9/L (ref 0–0.9)
ABSOLUTE NEUT #: 4.4 X10E9/L (ref 1.5–6.6)
ALBUMIN SERPL-MCNC: 3.9 G/DL (ref 3.2–5.3)
ALK PHOSPHATASE: 70 U/L (ref 39–130)
ALT SERPL-CCNC: 41 U/L (ref 0–40)
ANION GAP SERPL CALCULATED.3IONS-SCNC: 8 MMOL/L (ref 5–15)
AST SERPL-CCNC: 27 U/L (ref 0–41)
BASOPHILS RELATIVE PERCENT: 1.1 %
BILIRUB SERPL-MCNC: 0.2 MG/DL (ref 0.3–1.2)
BUN BLDV-MCNC: 13 MG/DL (ref 5–23)
C-REACTIVE PROTEIN: 0.4 MG/DL (ref 0–0.74)
CALCIUM SERPL-MCNC: 8.6 MG/DL (ref 8.5–10.5)
CHLORIDE BLD-SCNC: 105 MMOL/L (ref 98–109)
CO2: 28 MMOL/L (ref 22–32)
CREAT SERPL-MCNC: 1.29 MG/DL (ref 0.6–1.3)
EGFR AFRICAN AMERICAN: >60 ML/MIN/1.73SQ.M
EGFR IF NONAFRICAN AMERICAN: 57 ML/MIN/1.73SQ.M
EOSINOPHILS RELATIVE PERCENT: 3.1 %
GLUCOSE: 92 MG/DL (ref 65–99)
HCT VFR BLD CALC: 41.4 % (ref 39–49)
HEMOGLOBIN: 14 G/DL (ref 13–17)
LYMPHOCYTE %: 22.3 %
MCH RBC QN AUTO: 29.9 PG (ref 27–34)
MCHC RBC AUTO-ENTMCNC: 33.7 G/DL (ref 32–36)
MCV RBC AUTO: 89 FL (ref 80–100)
MONOCYTES # BLD: 7.6 %
NEUTROPHILS RELATIVE PERCENT: 65.9 %
PDW BLD-RTO: 12.8 % (ref 11.5–15)
PLATELETS: 317 X10E9/L (ref 150–450)
PMV BLD AUTO: 9.1 FL (ref 7–12)
POTASSIUM SERPL-SCNC: 4.2 MMOL/L (ref 3.5–5)
RBC: 4.67 X10E12/L (ref 4.1–5.7)
SEDIMENTATION RATE, ERYTHROCYTE: 5 MM/H (ref 0–20)
SODIUM BLD-SCNC: 141 MMOL/L (ref 134–146)
TOTAL PROTEIN: 6.4 G/DL (ref 6–8)
WBC: 6.7 X10E9/L (ref 4–11)

## 2022-03-30 ENCOUNTER — OFFICE VISIT (OUTPATIENT)
Dept: UROLOGY | Age: 62
End: 2022-03-30
Payer: COMMERCIAL

## 2022-03-30 VITALS
HEIGHT: 67 IN | WEIGHT: 195 LBS | BODY MASS INDEX: 30.61 KG/M2 | DIASTOLIC BLOOD PRESSURE: 84 MMHG | SYSTOLIC BLOOD PRESSURE: 140 MMHG

## 2022-03-30 DIAGNOSIS — N40.0 BENIGN PROSTATIC HYPERPLASIA, UNSPECIFIED WHETHER LOWER URINARY TRACT SYMPTOMS PRESENT: ICD-10-CM

## 2022-03-30 DIAGNOSIS — C61 PROSTATE CANCER (HCC): Primary | ICD-10-CM

## 2022-03-30 DIAGNOSIS — M06.00 SERONEGATIVE RHEUMATOID ARTHRITIS (HCC): ICD-10-CM

## 2022-03-30 PROCEDURE — 99214 OFFICE O/P EST MOD 30 MIN: CPT | Performed by: UROLOGY

## 2022-03-30 RX ORDER — GENTAMICIN SULFATE 40 MG/ML
80 INJECTION, SOLUTION INTRAMUSCULAR; INTRAVENOUS ONCE
Qty: 2 ML | Refills: 0 | Status: SHIPPED | OUTPATIENT
Start: 2022-03-30 | End: 2022-03-30

## 2022-03-30 RX ORDER — CIPROFLOXACIN 500 MG/1
500 TABLET, FILM COATED ORAL 2 TIMES DAILY
Qty: 6 TABLET | Refills: 0 | Status: SHIPPED | OUTPATIENT
Start: 2022-03-30 | End: 2022-04-02

## 2022-03-30 NOTE — PROGRESS NOTES
Dr. Cole Wynne MD  [facility])  Urology Clinic      Patient:  Garfield Caraballo  YOB: 1960  Date: 3/30/2022    HISTORY OF PRESENT ILLNESS:   The patient is a 62 y.o. male who presents today for evaluation of the following problem(s): Elevated PSA and BPH. No family history of prostate cancer. Severe LUTS from BPH. He had prostate biopsy in late 2020 and this showed [place] 6 prostate cancer. We discussed all options with him today. Prostate was 68g on biopsy. Cysto showed severe BPH with median lobe. We proceeded with TURP. All tissues were negative for cancer. Today he is doing very well. Very happy. Overall the problem(s) : are stable. Associated Symptoms: No dysuria, gross hematuria. Pain Severity: 0/10    Summary of old records:   [place] 6 Late 2020 in 1 core. GPS oncotype showed 23 very low risk. Imaging/Labs reviewed during today's visit:  I have independently reviewed and verified the following films during today's visit. MRI of the prostate. Last several PSA's:  Lab Results   Component Value Date    PSA 4.29 (H) 06/14/2021    PSA 4.31 (H) 06/07/2021    PSA 6.19 (H) 06/26/2020       Last total testosterone:  No results found for: TESTOSTERONE    Urinalysis today:  No results found for this visit on 03/30/22.       Last BUN and creatinine:  Lab Results   Component Value Date    BUN 13 03/24/2022     Lab Results   Component Value Date    CREATININE 1.29 03/24/2022       Imaging Reviewed during this Office Visit:   (results were independently reviewed by physician and radiology report verified)    PAST MEDICAL, FAMILY AND SOCIAL HISTORY:  Past Medical History:   Diagnosis Date    Asthma     Difficult intubation     in 8/25/2020 difficult to intubate when he had cervical sx    Hypertension     Rheumatoid arthritis ([address]     Stomach ulcer      Past Surgical History:   Procedure Laterality Date    [place]    [place]     CARPAL TUNNEL RELEASE Left 03/05/2020  CERVICAL DISC SURGERY      fusion in    [address] [address]  2006    fusion C3 C4 C5    TURP N/A 2020    CYSTOSCOPY TRANSURETHRAL RESECTION PROSTATE performed by Aleks Rodriguez MD at [address] [facility] PROSTATE/TRANSRECTAL N/A 2020    CYSTOSCOPY AND TRANSRECTAL ULTRASOUND GUIDED PROSTATE BIOPSY performed by Aleks Rodriguez MD at [facility] History   Problem Relation Age of Onset    Hypertension Mother     Esophageal Cancer Father         METS TO LIVER CA    Cancer Father         esophageal    Arthritis Son      Outpatient Medications Marked as Taking for the 3/30/22 encounter (Office Visit) with Aleks Rodriguez MD   Medication Sig Dispense Refill    traMADol (ULTRAM) 50 MG tablet Take 50 mg by mouth every 6 hours as needed for Pain.       celecoxib (CELEBREX) 100 MG capsule Take 1 capsule by mouth 2 times daily 60 capsule 3    traZODone (DESYREL) 50 MG tablet Take 1 tablet by mouth nightly 30 tablet 4    predniSONE (DELTASONE) 5 MG tablet Take 1 tablet by mouth daily 30 tablet 1    lisinopril (PRINIVIL;ZESTRIL) 20 MG tablet Take 1 tablet by mouth daily 30 tablet 11    amLODIPine (NORVASC) 5 MG tablet Take 1 tablet by mouth every evening 30 tablet 11       Acetaminophen and Codeine  Social History     Tobacco Use   Smoking Status Former Smoker    Packs/day: 0.25    Years: 15.00    Pack years: 3.75    Start date: 1990   Michele Quit date: 2008    Years since quittin.1   Smokeless Tobacco Never Used       Social History     Substance and Sexual Activity   Alcohol Use Never       REVIEW OF SYSTEMS:  Constitutional: negative  Eyes: negative  Respiratory: negative  Cardiovascular: negative  Gastrointestinal: negative  Musculoskeletal: negative  Genitourinary: negative except for what is in HPI  Skin: negative   Neurological: negative  Hematological/Lymphatic: negative  Psychological: negative    Physical Exam:    This a 62 y.o. male   Vitals:    22 1503   BP: (!) 140/84 Constitutional: Patient in no acute distress; Neuro: alert and oriented to person place and time. Psych: Mood and affect normal.  Skin: Normal  Lungs: Respiratory effort normal  Cardiovascular:  Normal peripheral pulses  Abdomen: Soft, non-tender, non-distended with no CVA, flank pain, hepatosplenomegaly or hernia. Kidneys normal.  Bladder non-tender and not distended. Lymphatics: no palpable lymphadenopathy       Assessment and Plan      1. Prostate cancer ([facility]    2. Benign prostatic hyperplasia, unspecified whether lower urinary tract symptoms present           Plan:      No follow-ups on file. Doing very well from urination standpoint. MRI reviewed. Will repeat biopsy and target these areas also. Ortiz and Mer July called in.           Dr. Sandie Rodriguez MD

## 2022-03-31 ENCOUNTER — TELEPHONE (OUTPATIENT)
Dept: UROLOGY | Age: 62
End: 2022-03-31

## 2022-03-31 RX ORDER — LEFLUNOMIDE 20 MG/1
20 TABLET ORAL DAILY
Qty: 30 TABLET | Refills: 0 | Status: SHIPPED | OUTPATIENT
Start: 2022-03-31 | End: 2022-09-21

## 2022-03-31 NOTE — TELEPHONE ENCOUNTER
MRI FUSION GUIDED PROSTATE BIOPSY WITH DR. CARMEN Pastor      1960    You are scheduled for the above procedure on:    4/20/22   at:    10:00AM  Your follow up appointment for your biopsy results is on:    5/4/22     At:   3:00PM    Please note that you will be responsible for any charges that are not paid by your insurance. The prostate biopsy specimens are sent to a Lab and their charges are billed to you separate from the office charges. DESCRIPTION OF PROSTATIC ULTRASOUND AND BIOPSY  Ultrasound uses harmless sound waves to give us pictures of the prostate, and allows us to accurately guide a biopsy needle to areas of concern. The procedure is done in the office. Initially, a complete prostate exam is done. Next the ultrasound probe, finger-like in size and shape, is placed into the rectum. With slight movement of the probe, many different views are obtained. A prostate block may or may not be given at this time. A spring loaded fine needle is place through the probe and directed into the prostate. Six or more biopsies are usually taken. These biopsies are not usually painful. The entire exam takes 20-30 minutes. POSSIBLE RISKS OF THE PROCEDURE  Blood may be noted in the stool and urine for a few days. Blood may be seen in the semen for up to a month. Infection of the prostate or in the urine can occur even with antibiotic preparation. You should call if you develop fever, chills, severe pain, or have continuous or significant bleeding. If you have known hemorrhoids, you may have more bleeding and discomfort in the rectal area following the biopsy. This may last for 3-5 days afterwards. If any concerns arise, please call the office. PREPARATION  1.  DO NOT TAKE: ASPIRIN, MOTRIN, PLAVIX, IBUPROFEN, MOBIC/ MELOXICAM, COUMADIN, FISH OIL, AND VITAMIN E FOR 5 DAYS BEFORE THE BIOPSY AND 3 DAYS AFTER THE BIOPSY. YOU MAY TAKE TYLENOL IF NEEDED  2. Please eat a regular breakfast/lunch. 3.  Take your antibiotics as directed:  Cipro 500 mg twice a day, start on:   4/19/22    take until finished. 4.  Bring your Gentamicin 80 mg with you to your appointment. WILL BE PROVIDED IN OFFICE  5. Do a Fleets Enema 2 hours before the visit. Purchase it at any drug store and follow the instructions on the package. 6. Please ensure to bring a  with you the day of the surgery to transport you home. HOME GOING AND FOLLOW UP INSTRUCTIONS  Call the doctor if: 1. There is a large amount of blood or clots in the urine or stool. 2.  You are unable to urinate. 3.  If your temperature is 101 degrees F or greater. 4.  You could see blood in your semen for up to 2-months            5.   You may resume your regular medication 3-days after procedure    DATE: 3/31/2022       SIGNATURE:________________________________

## 2022-04-06 ENCOUNTER — PATIENT MESSAGE (OUTPATIENT)
Dept: RHEUMATOLOGY | Age: 62
End: 2022-04-06

## 2022-04-06 NOTE — TELEPHONE ENCOUNTER
From: Yahaira Callahan  To: Dr. Leigh Letters: 4/6/2022 8:43 AM EDT  Subject: Aracely Barba    Has a prescription been called for this ? I had blood work done two days after appointment and have not been notified of any medicine being prescribed.

## 2022-04-20 ENCOUNTER — PROCEDURE VISIT (OUTPATIENT)
Dept: UROLOGY | Age: 62
End: 2022-04-20
Payer: COMMERCIAL

## 2022-04-20 VITALS
SYSTOLIC BLOOD PRESSURE: 144 MMHG | BODY MASS INDEX: 30.61 KG/M2 | HEIGHT: 67 IN | DIASTOLIC BLOOD PRESSURE: 88 MMHG | WEIGHT: 195 LBS

## 2022-04-20 DIAGNOSIS — C61 PROSTATE CANCER (HCC): Primary | ICD-10-CM

## 2022-04-20 DIAGNOSIS — R97.20 ELEVATED PSA: ICD-10-CM

## 2022-04-20 PROCEDURE — 55700 PR BIOPSY OF PROSTATE,NEEDLE/PUNCH: CPT | Performed by: UROLOGY

## 2022-04-20 PROCEDURE — 96372 THER/PROPH/DIAG INJ SC/IM: CPT | Performed by: UROLOGY

## 2022-04-20 PROCEDURE — 76872 US TRANSRECTAL: CPT | Performed by: UROLOGY

## 2022-04-20 RX ORDER — TOBRAMYCIN SULFATE 40 MG/ML
80 INJECTION, SOLUTION INTRAMUSCULAR; INTRAVENOUS ONCE
Status: COMPLETED | OUTPATIENT
Start: 2022-04-20 | End: 2022-04-20

## 2022-04-20 RX ADMIN — TOBRAMYCIN SULFATE 80 MG: 40 INJECTION, SOLUTION INTRAMUSCULAR; INTRAVENOUS at 10:08

## 2022-04-20 NOTE — PROGRESS NOTES
Dr. Rod Pandey MD  Urologic Surgery      [address] [place] [address]. [place]  04/20/22    Patient:  Marie Feng  MRN: [id number]  YOB: 1960    Surgeon: Dr. Rod Pandey MD  Assistant: None    Pre-op Diagnosis: Elevated PSA. Post-op Diagnosis: Elevated PSA. Procedure:   1. Trans-Rectal Ultrasound Guided Biopsy of the Prostate    Findings:        --Gland Size: 545g       --Prostate Lesions: left mid gland anterior lesion. Anesthesia: local  Complications: None  OR Blood Loss: Minimal  Fluids: Cystalloids  Specimens: prostate cores      Narrative of the Procedure:    After informed consent was obtain, the patient was taken to the operative suite. He remained on the [facility] [place]. He was rolled onto the side. The legs were brought toward the chest.   Antibiotics were given. A timeout occurred in which two patient identifiers were used. The rectal ultrasound probe was inserted and volumetric measurements were taken. The prostate volume was 45 grams. The prostate was assessed in its entirety and no hypoechoic or otherwise abnormal lesions were noted. The bilateral neurovascular bundles were located and 1% lidocaine local anesthetic was injected bilaterally for local nerve blocks. Starting at the base of the gland and worked apically, sampling was completed. A standard sextant template was employed bilaterally. A total of 1 core was taken within each quadrant for a total of 6 biopsies. I then sampled the 2 areas of concern. 3 biopsies were taken through each area. Once completed, the rectal ultrasound probe was removed. Inspection of the rectum demonstrated no active bleeding. The patient was rolled back into the supine position. He was then discharged back to the PACU in good and stable condition. Follow-Up: The patient will be discharged home today. Once resulted, the pathology report will be discussed with the patient.     Rod Pandey MD  Electronically signed on 4/20/2022 at 10:45 AM

## 2022-04-20 NOTE — PROGRESS NOTES
Procedure: Trus/Biopsy  Pt name and birth date verified Yes  Patient agrees Dr. Bishop Frnacis is taking biopsies of the prostate Yes  Patient took Enema 2 hours prior to procedure Yes  Patient took 2 pill(s) of Cipro day before procedure, day of, and will the day after Yes  Has patient eaten today? Yes  Patient stopped all blood thinners prior to surgery Yes    Patient has given me verbal consent to perform Tobramycin Injection  Yes    Following Dr. Sheri Godoy of care. Tobramycin 80MG/2ML GIVEN I.M right UOQ HIP  Lot Number: [id number]  Expiration Date: 12/01/2022  [facility] #: [id number]    Tobramycin supplied by office.

## 2022-05-03 DIAGNOSIS — M19.90 INFLAMMATORY ARTHRITIS: ICD-10-CM

## 2022-05-03 RX ORDER — PREDNISONE 1 MG/1
5 TABLET ORAL DAILY
Qty: 30 TABLET | Refills: 1 | Status: SHIPPED | OUTPATIENT
Start: 2022-05-03 | End: 2022-07-01

## 2022-05-03 NOTE — TELEPHONE ENCOUNTER
Patient is calling to see if he can get a refill on the prednisone. He said his arthritis was really bad with the weather.     Richi Proctor called requesting a refill on the following medications:  Requested Prescriptions     Pending Prescriptions Disp Refills    predniSONE (DELTASONE) 5 MG tablet 30 tablet 1     Sig: Take 1 tablet by mouth daily     Pharmacy verified:  [facility]  [address] [place] [address] [phone] [facility] [phone]    Date of last visit: 03/22/2022  Date of next visit (if applicable): 7/77/8377

## 2022-05-04 ENCOUNTER — OFFICE VISIT (OUTPATIENT)
Dept: UROLOGY | Age: 62
End: 2022-05-04
Payer: COMMERCIAL

## 2022-05-04 VITALS
DIASTOLIC BLOOD PRESSURE: 82 MMHG | BODY MASS INDEX: 29.03 KG/M2 | SYSTOLIC BLOOD PRESSURE: 140 MMHG | HEIGHT: 67 IN | WEIGHT: 185 LBS

## 2022-05-04 DIAGNOSIS — N40.0 BENIGN PROSTATIC HYPERPLASIA, UNSPECIFIED WHETHER LOWER URINARY TRACT SYMPTOMS PRESENT: ICD-10-CM

## 2022-05-04 DIAGNOSIS — C61 PROSTATE CANCER (HCC): Primary | ICD-10-CM

## 2022-05-04 PROCEDURE — 99214 OFFICE O/P EST MOD 30 MIN: CPT | Performed by: UROLOGY

## 2022-05-04 NOTE — PROGRESS NOTES
Dr. Antonia Motta MD  [facility])  Urology Clinic      Patient:  Latonia Holder  YOB: 1960  Date: 5/4/2022    HISTORY OF PRESENT ILLNESS:   The patient is a 62 y.o. male who presents today for evaluation of the following problem(s): Elevated PSA and BPH. No family history of prostate cancer. Severe LUTS from BPH. He had prostate biopsy in late 2020 and this showed [place] 6 prostate cancer. We discussed all options with him today. Prostate was 68g on biopsy. Cysto showed severe BPH with median lobe. We proceeded with TURP. All tissues were negative for cancer. Today he is doing very well. Very happy. Overall the problem(s) : are stable. Associated Symptoms: No dysuria, gross hematuria. Pain Severity: 0/10    Summary of old records:   [place] 6 Late 2020 in 1 core. GPS oncotype showed 23 very low risk. [place] 6 early 2022. Only 6% of one core showed [place] 6 prostate cancer. Imaging/Labs reviewed during today's visit:  I have independently reviewed and verified the following films during today's visit. MRI of the prostate. Last several PSA's:  Lab Results   Component Value Date    PSA 4.29 (H) 06/14/2021    PSA 4.31 (H) 06/07/2021    PSA 6.19 (H) 06/26/2020       Last total testosterone:  No results found for: TESTOSTERONE    Urinalysis today:  No results found for this visit on 05/04/22.       Last BUN and creatinine:  Lab Results   Component Value Date    BUN 13 03/24/2022     Lab Results   Component Value Date    CREATININE 1.29 03/24/2022       Imaging Reviewed during this Office Visit:   (results were independently reviewed by physician and radiology report verified)    PAST MEDICAL, FAMILY AND SOCIAL HISTORY:  Past Medical History:   Diagnosis Date    Asthma     Difficult intubation     in 8/25/2020 difficult to intubate when he had cervical sx    Hypertension     Rheumatoid arthritis ([address]     Stomach ulcer      Past Surgical History:   Procedure Laterality Date    [place]     CARPAL TUNNEL RELEASE Left 2020    CERVICAL DISC SURGERY      fusion in    [address] [address]  2006    fusion C3 C4 C5    TURP N/A 2020    CYSTOSCOPY TRANSURETHRAL RESECTION PROSTATE performed by Giuseppe Castro MD at [address] [facility] PROSTATE/TRANSRECTAL N/A 2020    CYSTOSCOPY AND TRANSRECTAL ULTRASOUND GUIDED PROSTATE BIOPSY performed by Giuseppe Castro MD at [facility] History   Problem Relation Age of Onset    Hypertension Mother     Esophageal Cancer Father         METS TO LIVER CA    Cancer Father         esophageal    Arthritis Son      Outpatient Medications Marked as Taking for the 22 encounter (Office Visit) with Giuseppe Castro MD   Medication Sig Dispense Refill    leflunomide (ARAVA) 20 MG tablet Take 1 tablet by mouth daily 30 tablet 0       Patient has no known allergies. Social History     Tobacco Use   Smoking Status Former Smoker    Packs/day: 0.25    Years: 15.00    Pack years: 3.75    Start date: 1990   Dossie Cindy Quit date: 2008    Years since quittin.2   Smokeless Tobacco Never Used       Social History     Substance and Sexual Activity   Alcohol Use Never       REVIEW OF SYSTEMS:  Constitutional: negative  Eyes: negative  Respiratory: negative  Cardiovascular: negative  Gastrointestinal: negative  Musculoskeletal: negative  Genitourinary: negative except for what is in HPI  Skin: negative   Neurological: negative  Hematological/Lymphatic: negative  Psychological: negative    Physical Exam:    This a 62 y.o. male   Vitals:    22 1502   BP: (!) 140/82     Constitutional: Patient in no acute distress; Neuro: alert and oriented to person place and time. Psych: Mood and affect normal.  Skin: Normal  Lungs: Respiratory effort normal  Cardiovascular:  Normal peripheral pulses  Abdomen: Soft, non-tender, non-distended with no CVA, flank pain, hepatosplenomegaly or hernia.   Kidneys normal.  Bladder non-tender and not distended. Lymphatics: no palpable lymphadenopathy       Assessment and Plan      1. Prostate cancer ([address]    2. Benign prostatic hyperplasia, unspecified whether lower urinary tract symptoms present           Plan:      No follow-ups on file. NCCN guidelines discussed. Will proceed with continued observation. Only 6% of one core showed [place] 6 prostate cancer.    See back in 1 year with PSA    Dr. Neel Reddy MD

## 2022-07-01 DIAGNOSIS — M19.90 INFLAMMATORY ARTHRITIS: ICD-10-CM

## 2022-07-01 RX ORDER — PREDNISONE 1 MG/1
5 TABLET ORAL DAILY
Qty: 30 TABLET | Refills: 1 | Status: SHIPPED | OUTPATIENT
Start: 2022-07-01 | End: 2022-09-21 | Stop reason: SDUPTHER

## 2022-09-26 ENCOUNTER — TELEPHONE (OUTPATIENT)
Dept: RHEUMATOLOGY | Age: 62
End: 2022-09-26

## 2022-09-26 DIAGNOSIS — Z51.81 MEDICATION MONITORING ENCOUNTER: Primary | ICD-10-CM

## 2022-09-27 ENCOUNTER — NURSE ONLY (OUTPATIENT)
Dept: LAB | Age: 62
End: 2022-09-27

## 2022-09-27 ENCOUNTER — OFFICE VISIT (OUTPATIENT)
Dept: RHEUMATOLOGY | Age: 62
End: 2022-09-27
Payer: COMMERCIAL

## 2022-09-27 VITALS
HEART RATE: 72 BPM | BODY MASS INDEX: 31.02 KG/M2 | DIASTOLIC BLOOD PRESSURE: 90 MMHG | HEIGHT: 66 IN | OXYGEN SATURATION: 95 % | SYSTOLIC BLOOD PRESSURE: 156 MMHG | WEIGHT: 193 LBS

## 2022-09-27 DIAGNOSIS — M19.042 OSTEOARTHRITIS OF FINGERS OF HANDS, BILATERAL: ICD-10-CM

## 2022-09-27 DIAGNOSIS — Z51.81 MEDICATION MONITORING ENCOUNTER: ICD-10-CM

## 2022-09-27 DIAGNOSIS — E78.2 MIXED HYPERLIPIDEMIA: ICD-10-CM

## 2022-09-27 DIAGNOSIS — G89.29 CHRONIC MIDLINE LOW BACK PAIN, UNSPECIFIED WHETHER SCIATICA PRESENT: ICD-10-CM

## 2022-09-27 DIAGNOSIS — M19.041 OSTEOARTHRITIS OF FINGERS OF HANDS, BILATERAL: ICD-10-CM

## 2022-09-27 DIAGNOSIS — M54.50 CHRONIC MIDLINE LOW BACK PAIN, UNSPECIFIED WHETHER SCIATICA PRESENT: ICD-10-CM

## 2022-09-27 DIAGNOSIS — C61 PROSTATE CA (HCC): ICD-10-CM

## 2022-09-27 DIAGNOSIS — M06.00 SERONEGATIVE RHEUMATOID ARTHRITIS (HCC): Primary | ICD-10-CM

## 2022-09-27 LAB
ALBUMIN SERPL-MCNC: 4 G/DL (ref 3.5–5.1)
ALBUMIN SERPL-MCNC: 4 G/DL (ref 3.5–5.1)
ALP BLD-CCNC: 75 U/L (ref 38–126)
ALP BLD-CCNC: 75 U/L (ref 38–126)
ALT SERPL-CCNC: 19 U/L (ref 11–66)
ALT SERPL-CCNC: 20 U/L (ref 11–66)
ANION GAP SERPL CALCULATED.3IONS-SCNC: 9 MEQ/L (ref 8–16)
AST SERPL-CCNC: 21 U/L (ref 5–40)
AST SERPL-CCNC: 22 U/L (ref 5–40)
BASOPHILS # BLD: 0.6 %
BASOPHILS ABSOLUTE: 0.1 THOU/MM3 (ref 0–0.1)
BILIRUB SERPL-MCNC: 0.3 MG/DL (ref 0.3–1.2)
BILIRUB SERPL-MCNC: 0.3 MG/DL (ref 0.3–1.2)
BILIRUBIN DIRECT: < 0.2 MG/DL (ref 0–0.3)
BUN BLDV-MCNC: 12 MG/DL (ref 7–22)
C-REACTIVE PROTEIN: < 0.3 MG/DL (ref 0–1)
CALCIUM SERPL-MCNC: 9.4 MG/DL (ref 8.5–10.5)
CHLORIDE BLD-SCNC: 104 MEQ/L (ref 98–111)
CHOLESTEROL, TOTAL: 249 MG/DL (ref 100–199)
CO2: 26 MEQ/L (ref 23–33)
CREAT SERPL-MCNC: 0.9 MG/DL (ref 0.4–1.2)
EOSINOPHIL # BLD: 0.7 %
EOSINOPHILS ABSOLUTE: 0.1 THOU/MM3 (ref 0–0.4)
ERYTHROCYTE [DISTWIDTH] IN BLOOD BY AUTOMATED COUNT: 12.6 % (ref 11.5–14.5)
ERYTHROCYTE [DISTWIDTH] IN BLOOD BY AUTOMATED COUNT: 42.7 FL (ref 35–45)
GFR SERPL CREATININE-BSD FRML MDRD: 86 ML/MIN/1.73M2
GLUCOSE BLD-MCNC: 101 MG/DL (ref 70–108)
HCT VFR BLD CALC: 45.9 % (ref 42–52)
HDLC SERPL-MCNC: 46 MG/DL
HEMOGLOBIN: 15.4 GM/DL (ref 14–18)
IMMATURE GRANS (ABS): 0.07 THOU/MM3 (ref 0–0.07)
IMMATURE GRANULOCYTES: 0.7 %
LDL CHOLESTEROL CALCULATED: 165 MG/DL
LYMPHOCYTES # BLD: 14.4 %
LYMPHOCYTES ABSOLUTE: 1.4 THOU/MM3 (ref 1–4.8)
MCH RBC QN AUTO: 31 PG (ref 26–33)
MCHC RBC AUTO-ENTMCNC: 33.6 GM/DL (ref 32.2–35.5)
MCV RBC AUTO: 92.4 FL (ref 80–94)
MONOCYTES # BLD: 4.8 %
MONOCYTES ABSOLUTE: 0.5 THOU/MM3 (ref 0.4–1.3)
NUCLEATED RED BLOOD CELLS: 0 /100 WBC
PLATELET # BLD: 371 THOU/MM3 (ref 130–400)
PMV BLD AUTO: 9.9 FL (ref 9.4–12.4)
POTASSIUM SERPL-SCNC: 4.4 MEQ/L (ref 3.5–5.2)
PROSTATE SPECIFIC ANTIGEN: 5.07 NG/ML (ref 0–1)
RBC # BLD: 4.97 MILL/MM3 (ref 4.7–6.1)
SEDIMENTATION RATE, ERYTHROCYTE: 3 MM/HR (ref 0–10)
SEG NEUTROPHILS: 78.8 %
SEGMENTED NEUTROPHILS ABSOLUTE COUNT: 7.6 THOU/MM3 (ref 1.8–7.7)
SODIUM BLD-SCNC: 139 MEQ/L (ref 135–145)
TOTAL PROTEIN: 6.7 G/DL (ref 6.1–8)
TOTAL PROTEIN: 6.9 G/DL (ref 6.1–8)
TRIGL SERPL-MCNC: 192 MG/DL (ref 0–199)
WBC # BLD: 9.7 THOU/MM3 (ref 4.8–10.8)

## 2022-09-27 PROCEDURE — 99214 OFFICE O/P EST MOD 30 MIN: CPT | Performed by: INTERNAL MEDICINE

## 2022-09-27 RX ORDER — CELECOXIB 200 MG/1
CAPSULE ORAL
COMMUNITY
Start: 2022-08-29

## 2022-09-27 RX ORDER — LEFLUNOMIDE 20 MG/1
20 TABLET ORAL DAILY
Qty: 30 TABLET | Refills: 0 | Status: SHIPPED | OUTPATIENT
Start: 2022-09-27 | End: 2022-10-30 | Stop reason: SDUPTHER

## 2022-09-27 ASSESSMENT — ENCOUNTER SYMPTOMS
DIARRHEA: 0
ABDOMINAL PAIN: 0
COUGH: 0
SHORTNESS OF BREATH: 1
CONSTIPATION: 0
BACK PAIN: 1
NAUSEA: 0
EYE ITCHING: 0
EYE PAIN: 0
TROUBLE SWALLOWING: 0

## 2022-09-27 NOTE — PROGRESS NOTES
[facility] UP NOTE       Date Of Service: 9/27/2022  Provider: Dipika Womack DO    Name: Lacie Arnett   MRN: [id number]    Chief Complaint(s)     Chief Complaint   Patient presents with    Follow-up     6 mo f/u. Seronegative rheumatoid arthritis (HCC)    Pain all over body (7/10)        History of Present Illness (HPI)     Lacie Arnett  is a(n)62 y.o. male with a hx of HTN, HLD, carpal tunnel bilat, h/o b/l plantar fasciitis here for the f/u evaluation of inflammatory arthritis, osteoarthritis. Patient reportedly off the leflunomide since April 2022 significant worsening of joint pain since this time. Anita Black He noted that while on the medicationPain is have improved. Currently taking prednisone 5 mg daily with the patient reporting (\"takes the edge off\")    -- currently pain in the finger, wrists, shoulder, hips, kenes, ankles, feet, neck and back  Most severe pain in the mornings. Pain can increase up to 8/10 over the past week. -- limited ROm of the Right hand b/c of the active arthritis. , weakness of hands. , limited RO of finer on right > left.  + AM stiffness lasting a couples  Aggravating factors: cold, inactivity, bumping joints, weather changes Alleviating factors: movement, celebrex, hot shower. swelling bilat hands. Reports dislocation of the thum, and 2,3 mcp intermittently occurring    INCREASED Numbness of the left leg for the past couple months. Active bilateral  Planter fasciitis reported. REVIEW OF SYSTEMS: (ROS)    Review of Systems   Constitutional:  Positive for fatigue. Negative for fever and unexpected weight change. HENT:  Positive for tinnitus. Negative for congestion and trouble swallowing. Eyes:  Negative for pain and itching. Respiratory:  Positive for shortness of breath (w/ asthma). Negative for cough. Cardiovascular:  Negative for chest pain and leg swelling.    Gastrointestinal:  Negative for abdominal pain, constipation, diarrhea and back: Normal range of motion and neck supple. Skin:     General: Skin is warm and dry. Findings: No rash. Neurological:      Mental Status: He is alert and oriented to person, place, and time. Deep Tendon Reflexes: Babinski sign absent on the right side. Babinski sign absent on the left side. Reflex Scores:       Patellar reflexes are 2+ on the right side and 0 on the left side. Achilles reflexes are 2+ on the right side and 2+ on the left side. Psychiatric:         Thought Content: Thought content normal.     Upper extremities:    SHOULDERS nontender ,   ELBOWS - tender jeevan.    WRISTS  --- tender bilateral, pain ROM left   HANDS/FINGERS   MCPs tender bilat - swleling right 2,3    PIPs tender bilat   Unable to make composite fist   PIP enlargement and DIP nodules    Lower extremities:  HIPS tender left  KNEES nt  ANKLES Non-tender   FEET : tender bilat MTPs    Spine:   Tender lumbosacral spine       LABS    CBC  Lab Results   Component Value Date/Time    WBC 6.7 03/24/2022 02:41 PM    WBC 7.1 12/21/2021 11:40 AM    RBC 4.67 03/24/2022 02:41 PM    HGB 14.0 03/24/2022 02:41 PM    HCT 41.4 03/24/2022 02:41 PM    MCV 89 03/24/2022 02:41 PM    MCH 29.9 03/24/2022 02:41 PM    MCHC 33.7 03/24/2022 02:41 PM    RDW 12.8 03/24/2022 02:41 PM     03/24/2022 02:41 PM     12/21/2021 11:40 AM       CMP  Lab Results   Component Value Date/Time    CALCIUM 8.6 03/24/2022 02:41 PM    LABALBU 3.9 03/24/2022 02:41 PM    PROT 6.4 03/24/2022 02:41 PM     03/24/2022 02:41 PM    K 4.2 03/24/2022 02:41 PM    CO2 28 03/24/2022 02:41 PM     03/24/2022 02:41 PM    BUN 13 03/24/2022 02:41 PM    CREATININE 1.29 03/24/2022 02:41 PM    ALKPHOS 70 03/24/2022 02:41 PM    ALKPHOS 95 12/21/2021 11:40 AM    ALT 41 03/24/2022 02:41 PM    AST 27 03/24/2022 02:41 PM       Lab Results   Component Value Date    ANASCRN None Detected 02/18/2020     No results found for: SSA  Lab Results   Component Value Date SSB 0 02/18/2020       Lab Results   Component Value Date    RF < 10 02/18/2020       No components found for: CANCASCRN, APANCASCRN  Lab Results   Component Value Date    SEDRATE 5 03/24/2022     Lab Results   Component Value Date    CRP 0.4 03/24/2022       RADIOLOGY:         ASSESSMENT/PLAN    Assessment   Plan     Seronegative rheumatoid arthritis vs SpA--- active synovitis,  - h/o plantar fasciitis, + erosions on PIPs, MCPs, inflammatory back pain. - prior tx: methotrexate (nausea, GI upset), prednisone (sig relief)   - Restart Arava 20 mg PO daily if labs are stable    - if not relief in 8  weeks will pursue anti-TNF therapy    - prednisone 5mg daily     Osteoarthritis of hands   - celebrex 100 mg TWICE DAILY     Chronic bilateral ow back pain with left sided sciatica  Moderate severe spinal stenosis s/p lumbar surgery aug 2020  Laminectomy aug 2020 w/ resolution of radicular symptoms. [place] + foraminl stenosis of the thoracic and lumbar spine. - MRI pelvis w/ w/o sacroiliitis. MRI lumbar and throacic spine with  mild endplate edema and fatty infiltration in lumbar spine, thoracoc spine, foraminal stenosis. Bilateral carpal tunnel syndrome - + tinel wrists and elbows. S/p CTS release--    Medication monitoring   - CBC, CMP, sed rate, CRP q 4 weeks x3 w/ arava titration     Return in about 6 weeks (around 11/8/2022). Electronically signed by Richard Alberto DO on 9/27/2022 at 11:44 AM    New Prescriptions    No medications on file       Thank you for allowing me to participate in the care of this patient. Please call if there are any questions.

## 2022-09-27 NOTE — RESULT ENCOUNTER NOTE
Labs are stable. The prescription for leflunomide 20 mg daily has been sent to your local pharmacy. Please have your labs repeated in 4 weeks.

## 2022-10-11 ENCOUNTER — PATIENT MESSAGE (OUTPATIENT)
Dept: RHEUMATOLOGY | Age: 62
End: 2022-10-11

## 2022-10-11 DIAGNOSIS — M19.90 INFLAMMATORY ARTHRITIS: ICD-10-CM

## 2022-10-11 RX ORDER — PREDNISONE 1 MG/1
5 TABLET ORAL DAILY
Qty: 30 TABLET | Refills: 11 | Status: SHIPPED | OUTPATIENT
Start: 2022-10-11

## 2022-10-11 NOTE — TELEPHONE ENCOUNTER
From: Milton Brown  To: Dr. Best Signs: 10/11/2022 10:54 AM EDT  Subject: Joint pain    Is there anything I can take for joint pain while waiting on the luflunemide to start helping? The Prednisone Rx was not renewed and it's 24/7 constant pain in my hands, feet and spine.

## 2022-10-12 DIAGNOSIS — M19.90 INFLAMMATORY ARTHRITIS: ICD-10-CM

## 2022-10-12 RX ORDER — PREDNISONE 1 MG/1
5 TABLET ORAL DAILY
Qty: 30 TABLET | Refills: 11 | OUTPATIENT
Start: 2022-10-12

## 2022-10-28 ENCOUNTER — NURSE ONLY (OUTPATIENT)
Dept: LAB | Age: 62
End: 2022-10-28

## 2022-10-28 DIAGNOSIS — Z51.81 MEDICATION MONITORING ENCOUNTER: ICD-10-CM

## 2022-10-28 LAB
ALBUMIN SERPL-MCNC: 4 G/DL (ref 3.5–5.1)
ALP BLD-CCNC: 81 U/L (ref 38–126)
ALT SERPL-CCNC: 23 U/L (ref 11–66)
ANION GAP SERPL CALCULATED.3IONS-SCNC: 11 MEQ/L (ref 8–16)
AST SERPL-CCNC: 24 U/L (ref 5–40)
BASOPHILS # BLD: 0.7 %
BASOPHILS ABSOLUTE: 0.1 THOU/MM3 (ref 0–0.1)
BILIRUB SERPL-MCNC: 0.2 MG/DL (ref 0.3–1.2)
BUN BLDV-MCNC: 16 MG/DL (ref 7–22)
C-REACTIVE PROTEIN: < 0.3 MG/DL (ref 0–1)
CALCIUM SERPL-MCNC: 9 MG/DL (ref 8.5–10.5)
CHLORIDE BLD-SCNC: 103 MEQ/L (ref 98–111)
CO2: 25 MEQ/L (ref 23–33)
CREAT SERPL-MCNC: 0.8 MG/DL (ref 0.4–1.2)
EOSINOPHIL # BLD: 1.8 %
EOSINOPHILS ABSOLUTE: 0.2 THOU/MM3 (ref 0–0.4)
ERYTHROCYTE [DISTWIDTH] IN BLOOD BY AUTOMATED COUNT: 12.5 % (ref 11.5–14.5)
ERYTHROCYTE [DISTWIDTH] IN BLOOD BY AUTOMATED COUNT: 43 FL (ref 35–45)
GFR SERPL CREATININE-BSD FRML MDRD: > 60 ML/MIN/1.73M2
GLUCOSE BLD-MCNC: 111 MG/DL (ref 70–108)
HCT VFR BLD CALC: 42.3 % (ref 42–52)
HEMOGLOBIN: 14.2 GM/DL (ref 14–18)
IMMATURE GRANS (ABS): 0.02 THOU/MM3 (ref 0–0.07)
IMMATURE GRANULOCYTES: 0.2 %
LYMPHOCYTES # BLD: 23.4 %
LYMPHOCYTES ABSOLUTE: 2 THOU/MM3 (ref 1–4.8)
MCH RBC QN AUTO: 31.4 PG (ref 26–33)
MCHC RBC AUTO-ENTMCNC: 33.6 GM/DL (ref 32.2–35.5)
MCV RBC AUTO: 93.6 FL (ref 80–94)
MONOCYTES # BLD: 7.9 %
MONOCYTES ABSOLUTE: 0.7 THOU/MM3 (ref 0.4–1.3)
NUCLEATED RED BLOOD CELLS: 0 /100 WBC
PLATELET # BLD: 331 THOU/MM3 (ref 130–400)
PMV BLD AUTO: 10 FL (ref 9.4–12.4)
POTASSIUM SERPL-SCNC: 3.8 MEQ/L (ref 3.5–5.2)
RBC # BLD: 4.52 MILL/MM3 (ref 4.7–6.1)
SEDIMENTATION RATE, ERYTHROCYTE: 3 MM/HR (ref 0–10)
SEG NEUTROPHILS: 66 %
SEGMENTED NEUTROPHILS ABSOLUTE COUNT: 5.6 THOU/MM3 (ref 1.8–7.7)
SODIUM BLD-SCNC: 139 MEQ/L (ref 135–145)
TOTAL PROTEIN: 6.8 G/DL (ref 6.1–8)
WBC # BLD: 8.5 THOU/MM3 (ref 4.8–10.8)

## 2022-10-30 DIAGNOSIS — M06.00 SERONEGATIVE RHEUMATOID ARTHRITIS (HCC): ICD-10-CM

## 2022-10-30 RX ORDER — LEFLUNOMIDE 20 MG/1
20 TABLET ORAL DAILY
Qty: 30 TABLET | Refills: 0 | Status: SHIPPED | OUTPATIENT
Start: 2022-10-30

## 2022-11-16 ENCOUNTER — OFFICE VISIT (OUTPATIENT)
Dept: RHEUMATOLOGY | Age: 62
End: 2022-11-16
Payer: COMMERCIAL

## 2022-11-16 VITALS
HEART RATE: 83 BPM | OXYGEN SATURATION: 95 % | SYSTOLIC BLOOD PRESSURE: 140 MMHG | DIASTOLIC BLOOD PRESSURE: 70 MMHG | WEIGHT: 192 LBS | BODY MASS INDEX: 30.86 KG/M2 | HEIGHT: 66 IN

## 2022-11-16 DIAGNOSIS — M19.041 OSTEOARTHRITIS OF FINGERS OF HANDS, BILATERAL: ICD-10-CM

## 2022-11-16 DIAGNOSIS — M19.042 OSTEOARTHRITIS OF FINGERS OF HANDS, BILATERAL: ICD-10-CM

## 2022-11-16 DIAGNOSIS — G89.29 CHRONIC MIDLINE LOW BACK PAIN, UNSPECIFIED WHETHER SCIATICA PRESENT: ICD-10-CM

## 2022-11-16 DIAGNOSIS — M54.50 CHRONIC MIDLINE LOW BACK PAIN, UNSPECIFIED WHETHER SCIATICA PRESENT: ICD-10-CM

## 2022-11-16 DIAGNOSIS — M06.00 SERONEGATIVE RHEUMATOID ARTHRITIS (HCC): Primary | ICD-10-CM

## 2022-11-16 DIAGNOSIS — Z51.81 MEDICATION MONITORING ENCOUNTER: ICD-10-CM

## 2022-11-16 PROCEDURE — 3074F SYST BP LT 130 MM HG: CPT | Performed by: NURSE PRACTITIONER

## 2022-11-16 PROCEDURE — 3078F DIAST BP <80 MM HG: CPT | Performed by: NURSE PRACTITIONER

## 2022-11-16 PROCEDURE — 99214 OFFICE O/P EST MOD 30 MIN: CPT | Performed by: NURSE PRACTITIONER

## 2022-11-16 RX ORDER — ADALIMUMAB 40MG/0.4ML
40 KIT SUBCUTANEOUS
Qty: 2 EACH | Refills: 5 | Status: ACTIVE | OUTPATIENT
Start: 2022-11-16 | End: 2022-11-17 | Stop reason: SDUPTHER

## 2022-11-16 ASSESSMENT — ENCOUNTER SYMPTOMS
EYE ITCHING: 0
ABDOMINAL PAIN: 0
DIARRHEA: 0
EYE PAIN: 0
NAUSEA: 0
SHORTNESS OF BREATH: 1
BACK PAIN: 1
TROUBLE SWALLOWING: 0
CONSTIPATION: 0
COUGH: 0

## 2022-11-16 NOTE — PROGRESS NOTES
[facility] RHEUMATOLOGY FOLLOW UP NOTE       Date Of Service: 11/16/2022  Provider: RENATO Naik - PAT    Name: Ni Boles   MRN: [id number]    Chief Complaint(s)     Chief Complaint   Patient presents with    Follow-up     6 week follow up        History of Present Illness (HPI)     Ni Boles  is a(n)62 y.o. male with a hx of HTN, HLD, carpal tunnel bilat, h/o b/l plantar fasciitis here for the f/u evaluation of inflammatory arthritis, osteoarthritis. Interval hx:    - some relief with the arava, but still continued joint pains and swelling    pain affecting the fingers, left knee, right toes, back, neck  Pain on a scale 0-10: 8/10  Type of pain: intermittent  Timing: mornings  Aggravating factors: cold, inactivity, bumping joints, weather changes  Alleviating factors: movement, celebrex, hot shower    Associated symptoms:  + swelling/  Redness/ warmth (hands),  + AM stiffness lasting ~ 2 hours      REVIEW OF SYSTEMS: (ROS)    Review of Systems   Constitutional:  Positive for fatigue. Negative for fever and unexpected weight change. HENT:  Positive for tinnitus. Negative for congestion and trouble swallowing. Eyes:  Negative for pain and itching. Respiratory:  Positive for shortness of breath. Negative for cough. Cardiovascular:  Negative for chest pain and leg swelling. Gastrointestinal:  Negative for abdominal pain, constipation, diarrhea and nausea. Endocrine: Negative for cold intolerance and heat intolerance. Genitourinary:  Negative for difficulty urinating, frequency and urgency. Musculoskeletal:  Positive for arthralgias, back pain, myalgias and neck pain. Negative for joint swelling. Skin:  Negative for rash. Neurological:  Positive for weakness and headaches. Negative for dizziness and numbness. Psychiatric/Behavioral:  Positive for sleep disturbance. The patient is nervous/anxious.       PAST MEDICAL HISTORY      Past Medical History:   Diagnosis Date Asthma     Difficult intubation     in 8/25/2020 difficult to intubate when he had cervical sx    Hypertension     Rheumatoid arthritis ([facility] [address]     Stomach ulcer        PAST SURGICAL HISTORY      Past Surgical History:   Procedure Laterality Date    BACK SURGERY  08/2020    [place]     CARPAL TUNNEL RELEASE Left 03/05/2020    CERVICAL [address] SURGERY      fusion in 2005    NECK SURGERY  2006    fusion C3 C4 C5    TURP N/A 12/23/2020    CYSTOSCOPY TRANSURETHRAL RESECTION PROSTATE performed by Nivia Stark MD at [address] [place] [address] Court PROSTATE/TRANSRECTAL N/A 9/23/2020    CYSTOSCOPY AND TRANSRECTAL ULTRASOUND GUIDED PROSTATE BIOPSY performed by Nivia Stark MD at [address] [place] [address] Ne      Family History   Problem Relation Age of Onset    Hypertension Mother     Esophageal Cancer Father         METS TO LIVER CA    Cancer Father         esophageal    Arthritis Son        SOCIAL HISTORY      Social History       Tobacco History       Smoking Status  Former Smoker Quit date  2/18/2008 Smoking Frequency  0.25 packs/day for 15 years (3.75 pk yrs)      Smokeless Tobacco Use  Never Used              Alcohol History       Alcohol Use Status  Never              Drug Use       Drug Use Status  Never              Sexual Activity       Sexually Active  Not Asked Comment   monogamous                    ALLERGIES     No Known Allergies      CURRENT MEDICATIONS      Current Outpatient Medications   Medication Sig Dispense Refill    traMADol (ULTRAM) 50 MG tablet Take 1 tablet by mouth 3 times daily as needed for Pain for up to 7 days. Intended supply: 7 days.  Take lowest dose possible to manage pain 21 tablet 0    leflunomide (ARAVA) 20 MG tablet Take 1 tablet by mouth daily 30 tablet 0    predniSONE (DELTASONE) 5 MG tablet Take 1 tablet by mouth daily 30 tablet 11    celecoxib (CELEBREX) 200 MG capsule       amLODIPine (NORVASC) 5 MG tablet Take 1 tablet by mouth daily 30 tablet 11    lisinopril (PRINIVIL;ZESTRIL) 20 MG tablet Take 1 tablet by mouth daily 30 tablet 11    traZODone (DESYREL) 50 MG tablet Take 1 tablet by mouth nightly 1/2 tablet at HS 15 tablet 11     No current facility-administered medications for this visit. PHYSICAL EXAMINATION / OBJECTIVE   Objective:  BP (!) 140/70 (Site: Right Upper Arm, Position: Sitting, Cuff Size: Large Adult)   Pulse 83   Ht 5' 5.98\" (1.676 m)   Wt 192 lb (87.1 kg)   SpO2 95%   BMI 31.00 kg/m²     Physical Exam  Vitals reviewed. Constitutional:       Appearance: He is well-developed. Cardiovascular:      Rate and Rhythm: Normal rate and regular rhythm. Pulmonary:      Effort: Pulmonary effort is normal.      Breath sounds: Normal breath sounds. Musculoskeletal:      Cervical back: Normal range of motion and neck supple. Skin:     General: Skin is warm and dry. Findings: No rash. Neurological:      Mental Status: He is alert and oriented to person, place, and time. Psychiatric:         Thought Content:  Thought content normal.     Upper extremities:    SHOULDERS nontender ,   ELBOWS tender bilat  WRISTS tender and painful ROM bilat, fullness right  HANDS/FINGERS    MCPs tender bilat, + swelling right 2,3    PIPs tender bilat   Unable to make composite fist, + PIP enlargement and DIP nodules    Lower extremities:  HIPS tender left  KNEES nontender  ANKLES nontender  FEET : + MTP tenderness bilat    Spine:   Tender lumbosacral spine       LABS    CBC  Lab Results   Component Value Date/Time    WBC 8.5 10/28/2022 03:15 PM    RBC 4.52 10/28/2022 03:15 PM    RBC 4.67 03/24/2022 02:41 PM    HGB 14.2 10/28/2022 03:15 PM    HCT 42.3 10/28/2022 03:15 PM    MCV 93.6 10/28/2022 03:15 PM    MCH 31.4 10/28/2022 03:15 PM    MCHC 33.6 10/28/2022 03:15 PM    RDW 12.8 03/24/2022 02:41 PM     10/28/2022 03:15 PM       CMP  Lab Results   Component Value Date/Time    CALCIUM 9.0 10/28/2022 03:15 PM    LABALBU 4.0 10/28/2022 03:15 PM    PROT 6.8 10/28/2022 03:15 PM    NA 139 10/28/2022 03:15 PM    K 3.8 10/28/2022 03:15 PM    CO2 25 10/28/2022 03:15 PM     10/28/2022 03:15 PM    BUN 16 10/28/2022 03:15 PM    CREATININE 0.8 10/28/2022 03:15 PM    ALKPHOS 81 10/28/2022 03:15 PM    ALT 23 10/28/2022 03:15 PM    AST 24 10/28/2022 03:15 PM       HgBA1c: No components found for: HGBA1C    No results found for: VITD25      Lab Results   Component Value Date    ANASCRN None Detected 02/18/2020     No results found for: SSA  Lab Results   Component Value Date    SSB 0 02/18/2020     No results found for: ANTI-SMITH  No results found for: DSDNAAB   No results found for: ANTIRNP  No results found for: C3, C4  No results found for: CCPAB  Lab Results   Component Value Date    RF < 10 02/18/2020       No components found for: CANCASCRN, APANCASCRN  Lab Results   Component Value Date    SEDRATE 3 10/28/2022     Lab Results   Component Value Date    CRP < 0.30 10/28/2022       RADIOLOGY:         ASSESSMENT/PLAN    Assessment   Plan     Seronegative rheumatoid arthritis vs SpA  - h/o plantar fasciitis, + erosions on PIPs, MCPs, inflammatory back pain. - prior tx: methotrexate (nausea, GI upset), prednisone (sig relief)   - celebrex 200 mg BID   - arava 20 mg PO daily    - prednisone 5 mg daily    - start humira 40 mg subcu q 2 weeks. Side effects: Rare hepatitis, drug induced lupus, increased risk respiratory infections, increased risk of lymphoma, infusion reaction    Osteoarthritis of hands   - celebrex 200 mg BID    Chronic bilateral ow back pain with left sided sciatica  Moderate severe spinal stenosis s/p lumbar surgery aug 2020  - xray lumbar spine with scoliosos, DJD, and facet arthritis. ? SI joints change on right. Laminectomy aug 2020 w/ resolution of radicular symptoms. Bilateral carpal tunnel syndrome  - + tinel wrists and elbows. S/p CTS release    Medication monitoring   - CBC, CMP, sed rate, CRP q 4 weeks x1    - TB gold negative (10/2021)      No follow-ups on file. Electronically signed by RENATO Mauricio CNP on 11/16/2022 at 11:37 AM    New Prescriptions    No medications on file       Thank you for allowing me to participate in the care of this patient. Please call if there are any questions.

## 2022-11-17 DIAGNOSIS — M06.00 SERONEGATIVE RHEUMATOID ARTHRITIS (HCC): ICD-10-CM

## 2022-11-17 RX ORDER — ADALIMUMAB 40MG/0.4ML
40 KIT SUBCUTANEOUS
Qty: 2 EACH | Refills: 5 | Status: ACTIVE | OUTPATIENT
Start: 2022-11-17

## 2022-11-18 ENCOUNTER — TELEPHONE (OUTPATIENT)
Dept: RHEUMATOLOGY | Age: 62
End: 2022-11-18

## 2022-11-23 NOTE — TELEPHONE ENCOUNTER
Spoke with pt and informed him of need for TB test and states he will go to [facility] once he is back in town.

## 2022-11-23 NOTE — TELEPHONE ENCOUNTER
Insurance over turned original approval stating patient needs new TB test done. Please call pt and see where he would like orders to be sent.

## 2022-12-02 DIAGNOSIS — M06.00 SERONEGATIVE RHEUMATOID ARTHRITIS (HCC): ICD-10-CM

## 2022-12-05 ENCOUNTER — NURSE ONLY (OUTPATIENT)
Dept: LAB | Age: 62
End: 2022-12-05

## 2022-12-05 DIAGNOSIS — Z51.81 MEDICATION MONITORING ENCOUNTER: ICD-10-CM

## 2022-12-05 DIAGNOSIS — M06.00 SERONEGATIVE RHEUMATOID ARTHRITIS (HCC): ICD-10-CM

## 2022-12-05 LAB
ALBUMIN SERPL-MCNC: 4.1 G/DL (ref 3.5–5.1)
ALP BLD-CCNC: 72 U/L (ref 38–126)
ALT SERPL-CCNC: 24 U/L (ref 11–66)
ANION GAP SERPL CALCULATED.3IONS-SCNC: 12 MEQ/L (ref 8–16)
AST SERPL-CCNC: 25 U/L (ref 5–40)
BASOPHILS # BLD: 0.6 %
BASOPHILS ABSOLUTE: 0 THOU/MM3 (ref 0–0.1)
BILIRUB SERPL-MCNC: 0.4 MG/DL (ref 0.3–1.2)
BUN BLDV-MCNC: 10 MG/DL (ref 7–22)
C-REACTIVE PROTEIN: < 0.3 MG/DL (ref 0–1)
CALCIUM SERPL-MCNC: 8.8 MG/DL (ref 8.5–10.5)
CHLORIDE BLD-SCNC: 105 MEQ/L (ref 98–111)
CO2: 25 MEQ/L (ref 23–33)
CREAT SERPL-MCNC: 0.9 MG/DL (ref 0.4–1.2)
EOSINOPHIL # BLD: 0.9 %
EOSINOPHILS ABSOLUTE: 0.1 THOU/MM3 (ref 0–0.4)
ERYTHROCYTE [DISTWIDTH] IN BLOOD BY AUTOMATED COUNT: 12.2 % (ref 11.5–14.5)
ERYTHROCYTE [DISTWIDTH] IN BLOOD BY AUTOMATED COUNT: 41.1 FL (ref 35–45)
GFR SERPL CREATININE-BSD FRML MDRD: > 60 ML/MIN/1.73M2
GLUCOSE BLD-MCNC: 107 MG/DL (ref 70–108)
HCT VFR BLD CALC: 44.2 % (ref 42–52)
HEMOGLOBIN: 15 GM/DL (ref 14–18)
IMMATURE GRANS (ABS): 0.03 THOU/MM3 (ref 0–0.07)
IMMATURE GRANULOCYTES: 0.4 %
LYMPHOCYTES # BLD: 8.9 %
LYMPHOCYTES ABSOLUTE: 0.7 THOU/MM3 (ref 1–4.8)
MCH RBC QN AUTO: 31.2 PG (ref 26–33)
MCHC RBC AUTO-ENTMCNC: 33.9 GM/DL (ref 32.2–35.5)
MCV RBC AUTO: 91.9 FL (ref 80–94)
MONOCYTES # BLD: 6.5 %
MONOCYTES ABSOLUTE: 0.5 THOU/MM3 (ref 0.4–1.3)
NUCLEATED RED BLOOD CELLS: 0 /100 WBC
PLATELET # BLD: 290 THOU/MM3 (ref 130–400)
PMV BLD AUTO: 10.4 FL (ref 9.4–12.4)
POTASSIUM SERPL-SCNC: 3.8 MEQ/L (ref 3.5–5.2)
RBC # BLD: 4.81 MILL/MM3 (ref 4.7–6.1)
SEG NEUTROPHILS: 82.7 %
SEGMENTED NEUTROPHILS ABSOLUTE COUNT: 6.8 THOU/MM3 (ref 1.8–7.7)
SODIUM BLD-SCNC: 142 MEQ/L (ref 135–145)
TOTAL PROTEIN: 6.1 G/DL (ref 6.1–8)
WBC # BLD: 8.2 THOU/MM3 (ref 4.8–10.8)

## 2022-12-05 RX ORDER — LEFLUNOMIDE 20 MG/1
20 TABLET ORAL DAILY
Qty: 30 TABLET | Refills: 0 | OUTPATIENT
Start: 2022-12-05

## 2022-12-06 DIAGNOSIS — M06.00 SERONEGATIVE RHEUMATOID ARTHRITIS (HCC): ICD-10-CM

## 2022-12-06 DIAGNOSIS — D72.810 LYMPHOPENIA: Primary | ICD-10-CM

## 2022-12-06 LAB — SEDIMENTATION RATE, ERYTHROCYTE: 1 MM/HR (ref 0–10)

## 2022-12-06 RX ORDER — LEFLUNOMIDE 10 MG/1
10 TABLET ORAL DAILY
Qty: 30 TABLET | Refills: 0 | Status: SHIPPED | OUTPATIENT
Start: 2022-12-06

## 2022-12-09 LAB
QUANTI TB GOLD PLUS: NEGATIVE
QUANTI TB1 MINUS NIL: 0 IU/ML (ref 0–0.34)
QUANTI TB2 MINUS NIL: 0 IU/ML (ref 0–0.34)
QUANTIFERON MITOGEN MINUS NIL: 0.94 IU/ML
QUANTIFERON NIL: 0.02 IU/ML

## 2022-12-13 ENCOUNTER — TELEPHONE (OUTPATIENT)
Dept: RHEUMATOLOGY | Age: 62
End: 2022-12-13

## 2023-01-03 ENCOUNTER — NURSE ONLY (OUTPATIENT)
Dept: LAB | Age: 63
End: 2023-01-03

## 2023-01-03 DIAGNOSIS — Z51.81 MEDICATION MONITORING ENCOUNTER: ICD-10-CM

## 2023-01-03 LAB
ALBUMIN SERPL-MCNC: 4 G/DL (ref 3.5–5.1)
ALP BLD-CCNC: 71 U/L (ref 38–126)
ALT SERPL-CCNC: 24 U/L (ref 11–66)
ANION GAP SERPL CALCULATED.3IONS-SCNC: 16 MEQ/L (ref 8–16)
AST SERPL-CCNC: 23 U/L (ref 5–40)
BASOPHILS # BLD: 0.9 %
BASOPHILS ABSOLUTE: 0.1 THOU/MM3 (ref 0–0.1)
BILIRUB SERPL-MCNC: 0.3 MG/DL (ref 0.3–1.2)
BUN BLDV-MCNC: 9 MG/DL (ref 7–22)
C-REACTIVE PROTEIN: < 0.3 MG/DL (ref 0–1)
CALCIUM SERPL-MCNC: 9.1 MG/DL (ref 8.5–10.5)
CHLORIDE BLD-SCNC: 103 MEQ/L (ref 98–111)
CO2: 22 MEQ/L (ref 23–33)
CREAT SERPL-MCNC: 0.8 MG/DL (ref 0.4–1.2)
EOSINOPHIL # BLD: 3.5 %
EOSINOPHILS ABSOLUTE: 0.3 THOU/MM3 (ref 0–0.4)
ERYTHROCYTE [DISTWIDTH] IN BLOOD BY AUTOMATED COUNT: 12.1 % (ref 11.5–14.5)
ERYTHROCYTE [DISTWIDTH] IN BLOOD BY AUTOMATED COUNT: 41.5 FL (ref 35–45)
GFR SERPL CREATININE-BSD FRML MDRD: > 60 ML/MIN/1.73M2
GLUCOSE BLD-MCNC: 94 MG/DL (ref 70–108)
HCT VFR BLD CALC: 43.7 % (ref 42–52)
HEMOGLOBIN: 14.6 GM/DL (ref 14–18)
IMMATURE GRANS (ABS): 0.02 THOU/MM3 (ref 0–0.07)
IMMATURE GRANULOCYTES: 0.3 %
LYMPHOCYTES # BLD: 23.7 %
LYMPHOCYTES ABSOLUTE: 1.8 THOU/MM3 (ref 1–4.8)
MCH RBC QN AUTO: 30.9 PG (ref 26–33)
MCHC RBC AUTO-ENTMCNC: 33.4 GM/DL (ref 32.2–35.5)
MCV RBC AUTO: 92.6 FL (ref 80–94)
MONOCYTES # BLD: 10.3 %
MONOCYTES ABSOLUTE: 0.8 THOU/MM3 (ref 0.4–1.3)
NUCLEATED RED BLOOD CELLS: 0 /100 WBC
PLATELET # BLD: 331 THOU/MM3 (ref 130–400)
PMV BLD AUTO: 10.5 FL (ref 9.4–12.4)
POTASSIUM SERPL-SCNC: 4.1 MEQ/L (ref 3.5–5.2)
RBC # BLD: 4.72 MILL/MM3 (ref 4.7–6.1)
SEG NEUTROPHILS: 61.3 %
SEGMENTED NEUTROPHILS ABSOLUTE COUNT: 4.5 THOU/MM3 (ref 1.8–7.7)
SODIUM BLD-SCNC: 141 MEQ/L (ref 135–145)
TOTAL PROTEIN: 6.2 G/DL (ref 6.1–8)
WBC # BLD: 7.4 THOU/MM3 (ref 4.8–10.8)

## 2023-01-04 DIAGNOSIS — M06.00 SERONEGATIVE RHEUMATOID ARTHRITIS (HCC): ICD-10-CM

## 2023-01-04 LAB
ALT SERPL-CCNC: 24 U/L (ref 11–66)
AST SERPL-CCNC: 23 U/L (ref 5–40)
LDL CHOLESTEROL DIRECT: 91.81 MG/DL
SEDIMENTATION RATE, ERYTHROCYTE: 3 MM/HR (ref 0–10)

## 2023-01-04 RX ORDER — LEFLUNOMIDE 10 MG/1
10 TABLET ORAL DAILY
Qty: 30 TABLET | Refills: 1 | Status: SHIPPED | OUTPATIENT
Start: 2023-01-04

## 2023-01-17 ENCOUNTER — OFFICE VISIT (OUTPATIENT)
Dept: RHEUMATOLOGY | Age: 63
End: 2023-01-17
Payer: COMMERCIAL

## 2023-01-17 VITALS
WEIGHT: 192.02 LBS | HEIGHT: 66 IN | OXYGEN SATURATION: 98 % | DIASTOLIC BLOOD PRESSURE: 86 MMHG | BODY MASS INDEX: 30.86 KG/M2 | HEART RATE: 74 BPM | SYSTOLIC BLOOD PRESSURE: 152 MMHG

## 2023-01-17 DIAGNOSIS — M06.00 SERONEGATIVE RHEUMATOID ARTHRITIS (HCC): Primary | ICD-10-CM

## 2023-01-17 DIAGNOSIS — M54.50 CHRONIC MIDLINE LOW BACK PAIN, UNSPECIFIED WHETHER SCIATICA PRESENT: ICD-10-CM

## 2023-01-17 DIAGNOSIS — M19.042 OSTEOARTHRITIS OF FINGERS OF HANDS, BILATERAL: ICD-10-CM

## 2023-01-17 DIAGNOSIS — G89.29 CHRONIC MIDLINE LOW BACK PAIN, UNSPECIFIED WHETHER SCIATICA PRESENT: ICD-10-CM

## 2023-01-17 DIAGNOSIS — M19.041 OSTEOARTHRITIS OF FINGERS OF HANDS, BILATERAL: ICD-10-CM

## 2023-01-17 DIAGNOSIS — Z51.81 MEDICATION MONITORING ENCOUNTER: ICD-10-CM

## 2023-01-17 DIAGNOSIS — M15.9 OSTEOARTHRITIS OF MULTIPLE JOINTS, UNSPECIFIED OSTEOARTHRITIS TYPE: ICD-10-CM

## 2023-01-17 PROCEDURE — 3077F SYST BP >= 140 MM HG: CPT | Performed by: NURSE PRACTITIONER

## 2023-01-17 PROCEDURE — 3079F DIAST BP 80-89 MM HG: CPT | Performed by: NURSE PRACTITIONER

## 2023-01-17 PROCEDURE — 99214 OFFICE O/P EST MOD 30 MIN: CPT | Performed by: NURSE PRACTITIONER

## 2023-01-17 RX ORDER — CELECOXIB 100 MG/1
CAPSULE ORAL
COMMUNITY
Start: 2023-01-03

## 2023-01-17 ASSESSMENT — ENCOUNTER SYMPTOMS
ABDOMINAL PAIN: 0
CONSTIPATION: 0
COUGH: 0
DIARRHEA: 0
TROUBLE SWALLOWING: 0
EYE PAIN: 0
SHORTNESS OF BREATH: 0
EYE ITCHING: 0
BACK PAIN: 1
NAUSEA: 0

## 2023-01-17 NOTE — PROGRESS NOTES
[facility] UP NOTE       Date Of Service: 1/17/2023  Provider: RENATO Rosario - CNP    Name: Brielle Torres   MRN: [id number]    Chief Complaint(s)     Chief Complaint   Patient presents with    Follow-up     2 mo f/u, Seronegative rheumatoid arthritis ([address] Inflammatory arthritis    Pt stated today he is just aching. History of Present Illness (HPI)     Brielle Torres  is a(n)62 y.o. male with a hx of HTN, HLD, carpal tunnel bilat, h/o b/l plantar fasciitis here for the f/u evaluation of inflammatory arthritis, osteoarthritis. Interval hx:    - arava decreased to 10 mg daily due to leukopenia   - did not start humira due to feeling better, but still having flares often    pain affecting the fingers, wrists, shoulders, feet, back,neck  Pain on a scale 0-10: 5/10  Type of pain: intermittent  Timing: mornings  Aggravating factors: cold, inactivity, bumping joints, weather changes  Alleviating factors: movement, celebrex, hot shower    Associated symptoms:  + swelling/  Redness/ warmth (hands),  denies AM stiffness      REVIEW OF SYSTEMS: (ROS)    Review of Systems   Constitutional:  Positive for fatigue. Negative for fever and unexpected weight change. HENT:  Positive for tinnitus. Negative for congestion and trouble swallowing. Eyes:  Negative for pain and itching. Respiratory:  Negative for cough and shortness of breath. Cardiovascular:  Negative for chest pain and leg swelling. Gastrointestinal:  Negative for abdominal pain, constipation, diarrhea and nausea. Endocrine: Negative for cold intolerance and heat intolerance. Genitourinary:  Negative for difficulty urinating, frequency and urgency. Musculoskeletal:  Positive for arthralgias, back pain, myalgias and neck pain. Negative for joint swelling. Skin:  Negative for rash. Neurological:  Positive for weakness and headaches. Negative for dizziness and numbness.    Psychiatric/Behavioral:  Positive for sleep disturbance. The patient is nervous/anxious.       PAST MEDICAL HISTORY      Past Medical History:   Diagnosis Date    Asthma     Difficult intubation     in 8/25/2020 difficult to intubate when he had cervical sx    Hypertension     Rheumatoid arthritis ([address]     Stomach ulcer        PAST SURGICAL HISTORY      Past Surgical History:   Procedure Laterality Date    BACK SURGERY  08/2020    [place]     CARPAL TUNNEL RELEASE Left 03/05/2020    CERVICAL [address] SURGERY      fusion in 2005    [address]  2006    fusion C3 C4 C5    TURP N/A 12/23/2020    CYSTOSCOPY TRANSURETHRAL RESECTION PROSTATE performed by Dmitriy Bhagat MD at [address] [place] [address] PROSTATE/TRANSRECTAL N/A 9/23/2020    CYSTOSCOPY AND TRANSRECTAL ULTRASOUND GUIDED PROSTATE BIOPSY performed by Dmitriy Bhagat MD at [address] [place] [address] Ne      Family History   Problem Relation Age of Onset    Hypertension Mother     Esophageal Cancer Father         METS TO LIVER CA    Cancer Father         esophageal    Arthritis Son        SOCIAL HISTORY      Social History       Tobacco History       Smoking Status  Former Smoker Quit date  2/18/2008 Smoking Frequency  0.25 packs/day for 15 years (3.75 pk yrs)      Smokeless Tobacco Use  Never Used              Alcohol History       Alcohol Use Status  Never              Drug Use       Drug Use Status  Never              Sexual Activity       Sexually Active  Not Asked Comment   monogamous                    ALLERGIES     No Known Allergies      CURRENT MEDICATIONS      Current Outpatient Medications   Medication Sig Dispense Refill    celecoxib (CELEBREX) 100 MG capsule       leflunomide (ARAVA) 10 MG tablet Take 1 tablet by mouth daily 30 tablet 1    simvastatin (ZOCOR) 20 MG tablet TAKE ONE TABLET DAILY AT BEDTIME 30 tablet 9    predniSONE (DELTASONE) 5 MG tablet Take 1 tablet by mouth daily 30 tablet 11    amLODIPine (NORVASC) 5 MG tablet Take 1 tablet by mouth daily 30 tablet 11    Adalimumab (HUMIRA PEN) 40 MG/0.4ML PNKT Inject 40 mg into the skin every 14 days (Patient not taking: Reported on 1/17/2023) 2 each 5    celecoxib (CELEBREX) 200 MG capsule  (Patient not taking: Reported on 1/17/2023)      lisinopril (PRINIVIL;ZESTRIL) 20 MG tablet Take 1 tablet by mouth daily 30 tablet 11    traZODone (DESYREL) 50 MG tablet Take 1 tablet by mouth nightly 1/2 tablet at HS 15 tablet 11     No current facility-administered medications for this visit. PHYSICAL EXAMINATION / OBJECTIVE   Objective:  BP (!) 142/88 (Site: Left Upper Arm, Position: Sitting, Cuff Size: Large Adult)   Pulse 74   Ht 5' 5.98\" (1.676 m)   Wt 192 lb 0.3 oz (87.1 kg)   SpO2 98%   BMI 31.01 kg/m²     Physical Exam  Vitals reviewed. Constitutional:       Appearance: He is well-developed. Cardiovascular:      Rate and Rhythm: Normal rate and regular rhythm. Pulmonary:      Effort: Pulmonary effort is normal.      Breath sounds: Normal breath sounds. Musculoskeletal:      Cervical back: Normal range of motion and neck supple. Skin:     General: Skin is warm and dry. Findings: No rash. Neurological:      Mental Status: He is alert and oriented to person, place, and time. Psychiatric:         Thought Content:  Thought content normal.     Upper extremities:    SHOULDERS nontender ,   ELBOWS tender bilat  WRISTS tender and painful ROM bila  HANDS/FINGERS    MCPs tender bilat, + swelling right 2,3    PIPs tender bilat    + PIP enlargement and DIP nodules    Lower extremities:  HIPS tender left  KNEES nontender  ANKLES nontender  FEET : + MTP tenderness bilat    Spine:   Tender lumbosacral spine       LABS    CBC  Lab Results   Component Value Date/Time    WBC 7.4 01/03/2023 09:36 AM    RBC 4.72 01/03/2023 09:36 AM    RBC 4.67 03/24/2022 02:41 PM    HGB 14.6 01/03/2023 09:36 AM    HCT 43.7 01/03/2023 09:36 AM    MCV 92.6 01/03/2023 09:36 AM    MCH 30.9 01/03/2023 09:36 AM    MCHC 33.4 01/03/2023 09:36 AM    RDW 12.8 03/24/2022 02:41 PM     01/03/2023 09:36 AM       CMP  Lab Results   Component Value Date/Time    CALCIUM 9.1 01/03/2023 09:36 AM    LABALBU 4.0 01/03/2023 09:36 AM    PROT 6.2 01/03/2023 09:36 AM     01/03/2023 09:36 AM    K 4.1 01/03/2023 09:36 AM    CO2 22 01/03/2023 09:36 AM     01/03/2023 09:36 AM    BUN 9 01/03/2023 09:36 AM    CREATININE 0.8 01/03/2023 09:36 AM    ALKPHOS 71 01/03/2023 09:36 AM    ALT 24 01/03/2023 09:38 AM    AST 23 01/03/2023 09:38 AM       HgBA1c: No components found for: HGBA1C    No results found for: VITD25      Lab Results   Component Value Date    ANASCRN None Detected 02/18/2020     No results found for: SSA  Lab Results   Component Value Date    SSB 0 02/18/2020     No results found for: ANTI-SMITH  No results found for: DSDNAAB   No results found for: ANTIRNP  No results found for: C3, C4  No results found for: CCPAB  Lab Results   Component Value Date    RF < 10 02/18/2020       No components found for: CANCASCRN, APANCASCRN  Lab Results   Component Value Date    SEDRATE 3 01/03/2023     Lab Results   Component Value Date    CRP < 0.30 01/03/2023       RADIOLOGY:         ASSESSMENT/PLAN    Assessment   Plan     Seronegative rheumatoid arthritis vs SpA  - h/o plantar fasciitis, + erosions on PIPs, MCPs, inflammatory back pain. - prior tx: methotrexate (nausea, GI upset), prednisone (sig relief), arava 20 mg daily (leukopenia)   - celebrex 200 mg BID   - arava 10 mg PO daily    - prednisone 5 mg daily    - information given to patient on humira- unsure if he would like to start yet or not. Osteoarthritis of hands   - celebrex 200 mg BID    Chronic bilateral ow back pain with left sided sciatica  Moderate severe spinal stenosis s/p lumbar surgery aug 2020  - xray lumbar spine with scoliosos, DJD, and facet arthritis. ? SI joints change on right. Laminectomy aug 2020 w/ resolution of radicular symptoms.      Bilateral carpal tunnel syndrome  - + tinel wrists and elbows. S/p CTS release    Medication monitoring   - CBC, CMP, sed rate, CRP q 8 weeks    - TB gold negative (10/2021)      No follow-ups on file. Electronically signed by RENATO Lamar CNP on 1/17/2023 at 12:05 PM    New Prescriptions    No medications on file       Thank you for allowing me to participate in the care of this patient. Please call if there are any questions.

## 2023-02-22 DIAGNOSIS — M06.00 SERONEGATIVE RHEUMATOID ARTHRITIS (HCC): ICD-10-CM

## 2023-02-23 RX ORDER — LEFLUNOMIDE 10 MG/1
10 TABLET ORAL DAILY
Qty: 30 TABLET | Refills: 1 | OUTPATIENT
Start: 2023-02-23

## 2023-03-14 ENCOUNTER — NURSE ONLY (OUTPATIENT)
Dept: LAB | Age: 63
End: 2023-03-14

## 2023-03-14 DIAGNOSIS — E78.5 HYPERLIPIDEMIA, UNSPECIFIED HYPERLIPIDEMIA TYPE: ICD-10-CM

## 2023-03-14 DIAGNOSIS — Z51.81 MEDICATION MONITORING ENCOUNTER: ICD-10-CM

## 2023-03-14 LAB
ALT SERPL W/O P-5'-P-CCNC: 23 U/L (ref 11–66)
AST SERPL-CCNC: 21 U/L (ref 5–40)
LDLC SERPL DIRECT ASSAY-MCNC: 94.42 MG/DL

## 2023-03-20 DIAGNOSIS — M06.00 SERONEGATIVE RHEUMATOID ARTHRITIS (HCC): ICD-10-CM

## 2023-03-20 DIAGNOSIS — Z51.81 MEDICATION MONITORING ENCOUNTER: Primary | ICD-10-CM

## 2023-03-20 RX ORDER — LEFLUNOMIDE 10 MG/1
10 TABLET ORAL DAILY
Qty: 30 TABLET | Refills: 1 | OUTPATIENT
Start: 2023-03-20

## 2023-08-30 ENCOUNTER — HOSPITAL ENCOUNTER (OUTPATIENT)
Dept: GENERAL RADIOLOGY | Age: 63
Discharge: HOME | End: 2023-08-30
Attending: FAMILY MEDICINE
Payer: COMMERCIAL

## 2023-08-30 ENCOUNTER — HOSPITAL ENCOUNTER (OUTPATIENT)
Age: 63
Discharge: HOME | End: 2023-08-30
Payer: COMMERCIAL

## 2023-08-30 DIAGNOSIS — M25.511 ACUTE PAIN OF RIGHT SHOULDER: ICD-10-CM

## 2023-08-30 PROCEDURE — 73030 X-RAY EXAM OF SHOULDER: CPT

## 2023-10-20 ENCOUNTER — HOSPITAL ENCOUNTER (OUTPATIENT)
Dept: ULTRASOUND IMAGING | Age: 63
Discharge: HOME | End: 2023-10-20
Attending: FAMILY MEDICINE
Payer: COMMERCIAL

## 2023-10-20 ENCOUNTER — HOSPITAL ENCOUNTER (OUTPATIENT)
Dept: MRI IMAGING | Age: 63
Discharge: HOME | End: 2023-10-20
Attending: FAMILY MEDICINE
Payer: COMMERCIAL

## 2023-10-20 DIAGNOSIS — M25.511 RIGHT SHOULDER PAIN, UNSPECIFIED CHRONICITY: ICD-10-CM

## 2023-10-20 DIAGNOSIS — N50.89 ENLARGED TESTICLE: ICD-10-CM

## 2023-10-20 PROCEDURE — 76870 US EXAM SCROTUM: CPT

## 2023-10-20 PROCEDURE — 73221 MRI JOINT UPR EXTREM W/O DYE: CPT

## 2023-11-15 ENCOUNTER — OFFICE VISIT (OUTPATIENT)
Dept: UROLOGY | Age: 63
End: 2023-11-15
Payer: COMMERCIAL

## 2023-11-15 VITALS — BODY MASS INDEX: 29.1 KG/M2 | WEIGHT: 192 LBS | RESPIRATION RATE: 18 BRPM | HEIGHT: 68 IN

## 2023-11-15 DIAGNOSIS — N40.0 BENIGN PROSTATIC HYPERPLASIA, UNSPECIFIED WHETHER LOWER URINARY TRACT SYMPTOMS PRESENT: ICD-10-CM

## 2023-11-15 DIAGNOSIS — R97.20 ELEVATED PSA: ICD-10-CM

## 2023-11-15 DIAGNOSIS — N43.3 HYDROCELE, UNSPECIFIED HYDROCELE TYPE: ICD-10-CM

## 2023-11-15 DIAGNOSIS — C61 PROSTATE CANCER (HCC): Primary | ICD-10-CM

## 2023-11-15 PROCEDURE — 99214 OFFICE O/P EST MOD 30 MIN: CPT | Performed by: UROLOGY

## 2023-11-15 NOTE — PROGRESS NOTES
History:   Procedure Laterality Date    BACK SURGERY  08/2020    [place]     CARPAL TUNNEL RELEASE Left 03/05/2020    CERVICAL DISC SURGERY      fusion in 2005    NECK SURGERY  2006    fusion C3 C4 C5    TURP N/A 12/23/2020    CYSTOSCOPY TRANSURETHRAL RESECTION PROSTATE performed by Gail Soto MD at [address] [facility] PROSTATE/TRANSRECTAL N/A 9/23/2020    CYSTOSCOPY AND TRANSRECTAL ULTRASOUND GUIDED PROSTATE BIOPSY performed by Gail Soto MD at [address] History   Problem Relation Age of Onset    Hypertension Mother     Esophageal Cancer Father         METS TO LIVER CA    Cancer Father         esophageal    Arthritis Son      Outpatient Medications Marked as Taking for the 11/15/23 encounter (Office Visit) with Gail Soto MD   Medication Sig Dispense Refill    traZODone (DESYREL) 50 MG tablet Take 1 tablet by mouth nightly 1/2 tablet at HS 15 tablet 11    simvastatin (ZOCOR) 20 MG tablet TAKE ONE TABLET DAILY AT BEDTIME 30 tablet 11    lisinopril (PRINIVIL;ZESTRIL) 20 MG tablet Take 1 tablet by mouth daily 30 tablet 11    amLODIPine (NORVASC) 5 MG tablet Take 1 tablet by mouth daily 30 tablet 11    celecoxib (CELEBREX) 100 MG capsule Take 1 capsule by mouth 2 times daily 60 capsule 11    predniSONE (DELTASONE) 5 MG tablet Take 1 tablet by mouth daily 30 tablet 11    traMADol (ULTRAM) 50 MG tablet Take 1 tablet by mouth 2 times daily as needed for Pain for up to 180 days. Intended supply: 7 days. Take lowest dose possible to manage pain Max Daily Amount: 100 mg 50 tablet 5       Patient has no known allergies.   Social History     Tobacco Use   Smoking Status Former    Packs/day: 0.25    Years: 15.00    Additional pack years: 0.00    Total pack years: 3.75    Types: Cigarettes    Start date: 1/1/1990    Quit date: 2/18/2008    Years since quitting: 15.7   Smokeless Tobacco Never       Social History     Substance and Sexual Activity   Alcohol Use Never       REVIEW OF

## 2023-12-06 ENCOUNTER — HOSPITAL ENCOUNTER (OUTPATIENT)
Dept: GENERAL RADIOLOGY | Age: 63
Discharge: HOME | End: 2023-12-06
Payer: COMMERCIAL

## 2023-12-06 ENCOUNTER — NURSE ONLY (OUTPATIENT)
Dept: LAB | Age: 63
End: 2023-12-06

## 2023-12-06 ENCOUNTER — HOSPITAL ENCOUNTER (OUTPATIENT)
Age: 63
Discharge: HOME | End: 2023-12-06
Payer: COMMERCIAL

## 2023-12-06 DIAGNOSIS — Z01.818 PRE-OP TESTING: ICD-10-CM

## 2023-12-06 LAB
ANION GAP SERPL CALC-SCNC: 14 MEQ/L (ref 8–16)
BASOPHILS ABSOLUTE: 0.1 THOU/MM3 (ref 0–0.1)
BASOPHILS NFR BLD AUTO: 0.8 %
BUN SERPL-MCNC: 14 MG/DL (ref 7–22)
CALCIUM SERPL-MCNC: 9.3 MG/DL (ref 8.5–10.5)
CHLORIDE SERPL-SCNC: 106 MEQ/L (ref 98–111)
CO2 SERPL-SCNC: 23 MEQ/L (ref 23–33)
CREAT SERPL-MCNC: 0.9 MG/DL (ref 0.4–1.2)
DEPRECATED RDW RBC AUTO: 40.9 FL (ref 35–45)
EOSINOPHIL NFR BLD AUTO: 1.8 %
EOSINOPHILS ABSOLUTE: 0.2 THOU/MM3 (ref 0–0.4)
ERYTHROCYTE [DISTWIDTH] IN BLOOD BY AUTOMATED COUNT: 12.1 % (ref 11.5–14.5)
GFR SERPL CREATININE-BSD FRML MDRD: > 60 ML/MIN/1.73M2
GLUCOSE SERPL-MCNC: 103 MG/DL (ref 70–108)
HCT VFR BLD AUTO: 43.3 % (ref 42–52)
HGB BLD-MCNC: 14.4 GM/DL (ref 14–18)
IMM GRANULOCYTES # BLD AUTO: 0.03 THOU/MM3 (ref 0–0.07)
IMM GRANULOCYTES NFR BLD AUTO: 0.4 %
INR BLD: 1.04 (ref 0.85–1.13)
LYMPHOCYTES ABSOLUTE: 1.3 THOU/MM3 (ref 1–4.8)
LYMPHOCYTES NFR BLD AUTO: 15.5 %
MCH RBC QN AUTO: 30.5 PG (ref 26–33)
MCHC RBC AUTO-ENTMCNC: 33.3 GM/DL (ref 32.2–35.5)
MCV RBC AUTO: 91.7 FL (ref 80–94)
MONOCYTES ABSOLUTE: 0.6 THOU/MM3 (ref 0.4–1.3)
MONOCYTES NFR BLD AUTO: 6.6 %
NEUTROPHILS NFR BLD AUTO: 74.9 %
NRBC BLD AUTO-RTO: 0 /100 WBC
PLATELET # BLD AUTO: 342 THOU/MM3 (ref 130–400)
PMV BLD AUTO: 10.1 FL (ref 9.4–12.4)
POTASSIUM SERPL-SCNC: 4.5 MEQ/L (ref 3.5–5.2)
RBC # BLD AUTO: 4.72 MILL/MM3 (ref 4.7–6.1)
SEGMENTED NEUTROPHILS ABSOLUTE COUNT: 6.4 THOU/MM3 (ref 1.8–7.7)
SODIUM SERPL-SCNC: 143 MEQ/L (ref 135–145)
WBC # BLD AUTO: 8.5 THOU/MM3 (ref 4.8–10.8)

## 2023-12-06 PROCEDURE — 71046 X-RAY EXAM CHEST 2 VIEWS: CPT

## 2023-12-06 PROCEDURE — 72040 X-RAY EXAM NECK SPINE 2-3 VW: CPT

## 2023-12-12 ENCOUNTER — HOSPITAL ENCOUNTER (OUTPATIENT)
Age: 63
Discharge: HOME | End: 2023-12-12
Payer: COMMERCIAL

## 2023-12-12 LAB
EKG ATRIAL RATE: 67 BPM
EKG P AXIS: 38 DEGREES
EKG P-R INTERVAL: 136 MS
EKG Q-T INTERVAL: 380 MS
EKG QRS DURATION: 86 MS
EKG QTC CALCULATION (BAZETT): 401 MS
EKG R AXIS: 67 DEGREES
EKG T AXIS: 56 DEGREES
EKG VENTRICULAR RATE: 67 BPM

## 2023-12-12 PROCEDURE — 93005 ELECTROCARDIOGRAM TRACING: CPT | Performed by: FAMILY MEDICINE

## 2023-12-12 PROCEDURE — 93010 ELECTROCARDIOGRAM REPORT: CPT | Performed by: INTERNAL MEDICINE

## 2024-05-13 ENCOUNTER — NURSE ONLY (OUTPATIENT)
Dept: LAB | Age: 64
End: 2024-05-13

## 2024-05-13 DIAGNOSIS — C61 PROSTATE CANCER (HCC): ICD-10-CM

## 2024-05-13 LAB — PSA SERPL-MCNC: 4.5 NG/ML (ref 0–1)

## 2024-05-15 ENCOUNTER — OFFICE VISIT (OUTPATIENT)
Dept: UROLOGY | Age: 64
End: 2024-05-15
Payer: COMMERCIAL

## 2024-05-15 VITALS
SYSTOLIC BLOOD PRESSURE: 138 MMHG | DIASTOLIC BLOOD PRESSURE: 88 MMHG | BODY MASS INDEX: 29.04 KG/M2 | HEIGHT: 68 IN | WEIGHT: 191.6 LBS

## 2024-05-15 DIAGNOSIS — N43.3 HYDROCELE, UNSPECIFIED HYDROCELE TYPE: ICD-10-CM

## 2024-05-15 DIAGNOSIS — N52.8 OTHER MALE ERECTILE DYSFUNCTION: ICD-10-CM

## 2024-05-15 DIAGNOSIS — C61 PROSTATE CANCER (HCC): Primary | ICD-10-CM

## 2024-05-15 DIAGNOSIS — N40.0 BENIGN PROSTATIC HYPERPLASIA, UNSPECIFIED WHETHER LOWER URINARY TRACT SYMPTOMS PRESENT: ICD-10-CM

## 2024-05-15 PROCEDURE — 99214 OFFICE O/P EST MOD 30 MIN: CPT | Performed by: UROLOGY

## 2024-05-15 PROCEDURE — 3079F DIAST BP 80-89 MM HG: CPT | Performed by: UROLOGY

## 2024-05-15 PROCEDURE — 3075F SYST BP GE 130 - 139MM HG: CPT | Performed by: UROLOGY

## 2024-05-15 RX ORDER — TADALAFIL 20 MG/1
20 TABLET ORAL DAILY PRN
Qty: 30 TABLET | Refills: 1 | Status: SHIPPED | OUTPATIENT
Start: 2024-05-15

## 2024-05-15 NOTE — PROGRESS NOTES
Dr. Jason Pruitt MD  [facility]  Urology Clinic      Patient:  Richi Proctor  YOB: 1960  Date: 5/15/2024    HISTORY OF PRESENT ILLNESS:   The patient is a 63 y.o. male who presents today for evaluation of the following problem(s): Elevated PSA and BPH. No family history of prostate cancer. Severe LUTS from BPH. He had prostate biopsy in late 2020 and this showed [place] 6 prostate cancer. We discussed all options with him today. Prostate was 68g on biopsy. Cysto showed severe BPH with median lobe. We proceeded with TURP. All tissues were negative for cancer. Today he is doing very well. Very happy. Nov 2023 his left testis has hydrocele.     Overall the problem(s) : are stable.  Associated Symptoms: No dysuria, gross hematuria.  Pain Severity: 0/10    Summary of old records:   Marilu 6 Late 2020 in 1 core. GPS oncotype showed 23 very low risk.   Marilu 6 early 2022. Only 6% of one core showed [place]il[place] 6 prostate cancer.    Imaging/Labs reviewed during today's visit:  I have independently reviewed and verified the following films during today's visit.  MRI of the prostate.     Last several PSA's:  Lab Results   Component Value Date    PSA 4.50 (H) 05/13/2024    PSA 5.69 (H) 10/04/2023    PSA 5.07 (H) 09/27/2022       Last total testosterone:  No results found for: \"TESTOSTERONE\"    Urinalysis today:  No results found for this visit on 05/15/24.      Last BUN and creatinine:  Lab Results   Component Value Date    BUN 14 12/06/2023     Lab Results   Component Value Date    CREATININE 0.9 12/06/2023       Imaging Reviewed during this Office Visit:   (results were independently reviewed by physician and radiology report verified)    PAST MEDICAL, FAMILY AND SOCIAL HISTORY:  Past Medical History:   Diagnosis Date    Asthma     Difficult intubation     in 8/25/2020 difficult to intubate when he had cervical sx    Hypertension     Rheumatoid arthritis (HCC)     Stomach ulcer      Past Surgical History:

## 2024-06-15 DIAGNOSIS — N52.8 OTHER MALE ERECTILE DYSFUNCTION: ICD-10-CM

## 2024-06-17 RX ORDER — TADALAFIL 20 MG/1
20 TABLET ORAL DAILY PRN
Qty: 30 TABLET | Refills: 1 | Status: SHIPPED | OUTPATIENT
Start: 2024-06-17

## 2024-06-17 NOTE — TELEPHONE ENCOUNTER
Richi Proctor called requesting a refill on the following medications:  Requested Prescriptions     Pending Prescriptions Disp Refills    tadalafil (CIALIS) 20 MG tablet [Pharmacy Med Name: TADALAFIL 20 MG TABS 20 Tablet] 30 tablet 1     Sig: TAKE 1 TABLET BY MOUTH DAILY AS NEEDED FOR ERECTILE DYSFUNCTION     Pharmacy verified:  .pv      Date of last visit: 05/15/2024  Date of next visit (if applicable):05/21/2025

## 2024-10-17 NOTE — PROGRESS NOTES
Detail Level: Generalized
Diagnosis Orders   1. Seronegative rheumatoid arthritis (HCC)  leflunomide (ARAVA) 20 MG tablet   2.  Medication monitoring encounter  Hepatic Function Panel    Basic Metabolic Panel    CBC With Auto Differential    C-Reactive Protein
Detail Level: Detailed

## 2024-11-13 ENCOUNTER — LAB (OUTPATIENT)
Dept: LAB | Age: 64
End: 2024-11-13

## 2024-11-13 DIAGNOSIS — M79.641 PAIN IN BOTH HANDS: ICD-10-CM

## 2024-11-13 DIAGNOSIS — M79.642 PAIN IN BOTH HANDS: ICD-10-CM

## 2024-11-13 DIAGNOSIS — I10 ESSENTIAL HYPERTENSION: ICD-10-CM

## 2024-11-13 DIAGNOSIS — E78.2 MIXED HYPERLIPIDEMIA: ICD-10-CM

## 2024-11-13 DIAGNOSIS — M19.90 INFLAMMATORY ARTHRITIS: ICD-10-CM

## 2024-11-13 LAB
ALBUMIN SERPL BCG-MCNC: 4 G/DL (ref 3.5–5.1)
ALP SERPL-CCNC: 91 U/L (ref 38–126)
ALT SERPL W/O P-5'-P-CCNC: 15 U/L (ref 11–66)
ANION GAP SERPL CALC-SCNC: 9 MEQ/L (ref 8–16)
AST SERPL-CCNC: 17 U/L (ref 5–40)
BASOPHILS ABSOLUTE: 0 THOU/MM3 (ref 0–0.1)
BASOPHILS NFR BLD AUTO: 0.7 %
BILIRUB CONJ SERPL-MCNC: < 0.1 MG/DL (ref 0.1–13.8)
BILIRUB SERPL-MCNC: 0.3 MG/DL (ref 0.3–1.2)
BUN SERPL-MCNC: 13 MG/DL (ref 7–22)
CALCIUM SERPL-MCNC: 9.1 MG/DL (ref 8.5–10.5)
CHLORIDE SERPL-SCNC: 103 MEQ/L (ref 98–111)
CHOLEST SERPL-MCNC: 209 MG/DL (ref 100–199)
CO2 SERPL-SCNC: 27 MEQ/L (ref 23–33)
CREAT SERPL-MCNC: 0.9 MG/DL (ref 0.4–1.2)
CRP SERPL-MCNC: < 0.3 MG/DL (ref 0–1)
DEPRECATED RDW RBC AUTO: 40.7 FL (ref 35–45)
EOSINOPHIL NFR BLD AUTO: 1.9 %
EOSINOPHILS ABSOLUTE: 0.1 THOU/MM3 (ref 0–0.4)
ERYTHROCYTE [DISTWIDTH] IN BLOOD BY AUTOMATED COUNT: 12.3 % (ref 11.5–14.5)
ERYTHROCYTE [SEDIMENTATION RATE] IN BLOOD BY WESTERGREN METHOD: 1 MM/HR (ref 0–10)
GFR SERPL CREATININE-BSD FRML MDRD: > 90 ML/MIN/1.73M2
GLUCOSE SERPL-MCNC: 94 MG/DL (ref 70–108)
HCT VFR BLD AUTO: 45.4 % (ref 42–52)
HDLC SERPL-MCNC: 48 MG/DL
HGB BLD-MCNC: 14.9 GM/DL (ref 14–18)
IMM GRANULOCYTES # BLD AUTO: 0.02 THOU/MM3 (ref 0–0.07)
IMM GRANULOCYTES NFR BLD AUTO: 0.3 %
LDLC SERPL CALC-MCNC: 130 MG/DL
LYMPHOCYTES ABSOLUTE: 1.7 THOU/MM3 (ref 1–4.8)
LYMPHOCYTES NFR BLD AUTO: 24.6 %
MCH RBC QN AUTO: 30 PG (ref 26–33)
MCHC RBC AUTO-ENTMCNC: 32.8 GM/DL (ref 32.2–35.5)
MCV RBC AUTO: 91.3 FL (ref 80–94)
MONOCYTES ABSOLUTE: 0.6 THOU/MM3 (ref 0.4–1.3)
MONOCYTES NFR BLD AUTO: 8.6 %
NEUTROPHILS ABSOLUTE: 4.4 THOU/MM3 (ref 1.8–7.7)
NEUTROPHILS NFR BLD AUTO: 63.9 %
NRBC BLD AUTO-RTO: 0 /100 WBC
PLATELET # BLD AUTO: 363 THOU/MM3 (ref 130–400)
PMV BLD AUTO: 10.1 FL (ref 9.4–12.4)
POTASSIUM SERPL-SCNC: 4.1 MEQ/L (ref 3.5–5.2)
PROT SERPL-MCNC: 6.8 G/DL (ref 6.1–8)
PSA SERPL-MCNC: 5.14 NG/ML (ref 0–1)
RBC # BLD AUTO: 4.97 MILL/MM3 (ref 4.7–6.1)
RHEUMATOID FACT SERPL-ACNC: < 10 IU/ML (ref 0–13)
SODIUM SERPL-SCNC: 139 MEQ/L (ref 135–145)
TRIGL SERPL-MCNC: 154 MG/DL (ref 0–199)
WBC # BLD AUTO: 6.9 THOU/MM3 (ref 4.8–10.8)

## 2024-11-15 LAB — NUCLEAR IGG SER QL IA: NORMAL

## 2024-11-25 ENCOUNTER — TELEPHONE (OUTPATIENT)
Dept: UROLOGY | Age: 64
End: 2024-11-25

## 2024-11-25 NOTE — TELEPHONE ENCOUNTER
PT called back and wants to wait till January to do it with Dr. Pruitt.  Will need an office visit with Dr. Pruitt.

## 2024-11-25 NOTE — TELEPHONE ENCOUNTER
PT sent a message wants to have a hydrocelectomy by the end of the year with [facility]. [facility] has no openings till mid January.  LM with PT to see if he is okay seeing another provider to possibly getting in this year. If he is okay with a different provider it will have to be with Ernesto or Sherlyn, with office visit first.

## 2024-12-10 ENCOUNTER — OFFICE VISIT (OUTPATIENT)
Dept: UROLOGY | Age: 64
End: 2024-12-10
Payer: COMMERCIAL

## 2024-12-10 VITALS
BODY MASS INDEX: 30.22 KG/M2 | SYSTOLIC BLOOD PRESSURE: 132 MMHG | HEIGHT: 66 IN | DIASTOLIC BLOOD PRESSURE: 68 MMHG | WEIGHT: 188 LBS

## 2024-12-10 DIAGNOSIS — N43.3 HYDROCELE, LEFT: ICD-10-CM

## 2024-12-10 DIAGNOSIS — R10.32 LEFT INGUINAL PAIN: ICD-10-CM

## 2024-12-10 DIAGNOSIS — N13.8 BPH WITH OBSTRUCTION/LOWER URINARY TRACT SYMPTOMS: ICD-10-CM

## 2024-12-10 DIAGNOSIS — N40.1 BPH WITH OBSTRUCTION/LOWER URINARY TRACT SYMPTOMS: ICD-10-CM

## 2024-12-10 DIAGNOSIS — C61 PROSTATE CANCER (HCC): Primary | ICD-10-CM

## 2024-12-10 PROCEDURE — 99214 OFFICE O/P EST MOD 30 MIN: CPT

## 2024-12-10 PROCEDURE — 3075F SYST BP GE 130 - 139MM HG: CPT

## 2024-12-10 PROCEDURE — 3078F DIAST BP <80 MM HG: CPT

## 2024-12-10 NOTE — PROGRESS NOTES
[facility] [place] [facility]  [facility] UROLOGY  [address]  [address]  [facility] [address]  Dept: [phone]  Loc: [phone]  Visit Date: 12/10/2024      HPI  Richi Proctor is a 64 y.o. male that presents to the urology clinic for left hydrocele follow-up.    Left Hydrocele & Left Inguinal Pain  Growing. Present for > 2 years. Now cosmetically bothersome and causing left inguinal pain.    Prostate CA (HCC)  Active Surveillance. Marilu 3+3=6, low volume. Detected in 2020 on TRUS Bx and re-demonstrated in April 2022. PSA of 6.19 at time of initial detection.    BPH w/ LUTS  S/P TURP in December of 2020 by Dr. Pruitt. Doing well post-operatively. Stream remains strong. Denies hesitancy, straining, feelings of incomplete emptying.      PSA Trend  5.14   (11/13/24)  4.50   (05/13/24)  5.69   (10/04/23)  5.07   (09/27/22)  4.29   (06/14/21)  4.31   (06/07/21)      Last BUN and Creatinine:  Lab Results   Component Value Date    BUN 13 11/13/2024     Lab Results   Component Value Date    CREATININE 0.9 11/13/2024           PAST MEDICAL, FAMILY AND SOCIAL HISTORY UPDATE:  Past Medical History:   Diagnosis Date    Asthma     Difficult intubation     in 8/25/2020 difficult to intubate when he had cervical sx    Hypertension     Rheumatoid arthritis (HCC)     Stomach ulcer      Past Surgical History:   Procedure Laterality Date    BACK SURGERY  08/2020    [place]     CARPAL TUNNEL RELEASE Left 03/05/2020    CERVICAL DISC SURGERY      fusion in 2005    NECK SURGERY  2006    fusion C3 C4 C5    ROTATOR CUFF REPAIR Right 12/21/2023    and bicep repair    TURP N/A 12/23/2020    CYSTOSCOPY TRANSURETHRAL RESECTION PROSTATE performed by Jason Pruitt MD at [facility] OR    ULTRASOUND PROSTATE/TRANSRECTAL N/A 09/23/2020    CYSTOSCOPY AND TRANSRECTAL ULTRASOUND GUIDED PROSTATE BIOPSY performed by Jason Pruitt MD at [facility] OR     Family History   Problem Relation Age of Onset    Hypertension Mother

## 2024-12-11 ENCOUNTER — TELEPHONE (OUTPATIENT)
Dept: UROLOGY | Age: 64
End: 2024-12-11

## 2024-12-11 DIAGNOSIS — N43.3 HYDROCELE, LEFT: Primary | ICD-10-CM

## 2024-12-11 DIAGNOSIS — Z01.818 PRE-OP TESTING: ICD-10-CM

## 2024-12-11 DIAGNOSIS — R10.32 LEFT INGUINAL PAIN: ICD-10-CM

## 2024-12-11 NOTE — TELEPHONE ENCOUNTER
DO NOT TAKE  FISH OIL, MOBIC, IBUPROFEN, MOTRIN-LIKE DRUGS AND ANY MULTIVITAMINS OR OVER THE COUNTER SUPPLEMENTS 14 DAYS PRIOR TO SURGERY.    HOLD LISINOPRIL THE MORNING OF SURGERY    MUST HAVE AN ADULT OVER THE AGE OF 18 WITH YOU AT THE TIME OF THE DISCHARGE         Richi Proctor 1960     Surgical Physician: Dr. Pruitt      You have been scheduled for the procedure marked below:      Surgery: LEFT HYDROCELECTOMY         Date: 1/22/2025     Anesthesia:  General     Place of Service: [facility] --Second Floor Same Day Surgery         Arrive to same day surgery at:  CALL SURGERY DESK [phone], THE DAY PRIOR TO SURGERY BETWEEN 8AM-4PM, FOR ARRIVAL TIME      INSTRUCTIONS AS MARKED BELOW:    1.  DO NOT eat or drink anything after midnight before surgery.   2.  We prefer you shower or bathe with an antibacterial soap (Dial) the morning of surgery.  3  Please bring a current medication list, photo ID and insurance card(s) with you  4. Okay to take Tylenol  5. Take blood pressure or heart medication as directed, if taken in the morning take with a small sip of water  6.The office will call you in 1-2 days after your procedure to schedule a follow up.    FOLLOWING SURGERY:  *keep area clean and dry, shower only no tub bath.     *roll up a towel and place under his scrotum to alleviate pain and help reduce swelling.     *use a jock strap for support and apply ice as needed for comfort.     *avoid any heavy lifting, pushing, pulling or straining until follow up visit.   Call office at [phone] if any questions.     DATE SENSITIVE PRE OP TESTING:    TO AVOID YOUR SURGERY BEING CANCELLED DO ON THE DATE LISTED *WALK IN *NO APPOINTMENT.      DO EKG NOW        Date: 12/11/2024

## 2024-12-11 NOTE — TELEPHONE ENCOUNTER
Patient is scheduled for surgery with  on 1/22/2025. Surgery consent to be done on arrival. Patient to do pre op EKG NOW. Surgery  mailed to the patient.     Patient informed an adult over the age of 18 must be with them at the time of surgery and upon discharge

## 2025-01-02 ENCOUNTER — HOSPITAL ENCOUNTER (OUTPATIENT)
Age: 65
Discharge: HOME | End: 2025-01-02
Payer: COMMERCIAL

## 2025-01-02 LAB
EKG ATRIAL RATE: 65 BPM
EKG P AXIS: 44 DEGREES
EKG P-R INTERVAL: 140 MS
EKG Q-T INTERVAL: 430 MS
EKG QRS DURATION: 140 MS
EKG QTC CALCULATION (BAZETT): 447 MS
EKG R AXIS: 73 DEGREES
EKG T AXIS: 23 DEGREES
EKG VENTRICULAR RATE: 65 BPM

## 2025-01-02 PROCEDURE — 93010 ELECTROCARDIOGRAM REPORT: CPT | Performed by: INTERNAL MEDICINE

## 2025-01-02 PROCEDURE — 93005 ELECTROCARDIOGRAM TRACING: CPT | Performed by: UROLOGY

## 2025-01-06 ENCOUNTER — PREP FOR PROCEDURE (OUTPATIENT)
Dept: UROLOGY | Age: 65
End: 2025-01-06

## 2025-01-08 RX ORDER — SODIUM CHLORIDE 0.9 % (FLUSH) 0.9 %
5-40 SYRINGE (ML) INJECTION PRN
Status: CANCELLED | OUTPATIENT
Start: 2025-01-08

## 2025-01-08 RX ORDER — SODIUM CHLORIDE 0.9 % (FLUSH) 0.9 %
5-40 SYRINGE (ML) INJECTION EVERY 12 HOURS SCHEDULED
Status: CANCELLED | OUTPATIENT
Start: 2025-01-08

## 2025-01-08 RX ORDER — SODIUM CHLORIDE 9 MG/ML
INJECTION, SOLUTION INTRAVENOUS PRN
Status: CANCELLED | OUTPATIENT
Start: 2025-01-08

## 2025-01-13 RX ORDER — OMEGA-3S/DHA/EPA/FISH OIL/D3 300MG-1000
400 CAPSULE ORAL DAILY
COMMUNITY

## 2025-01-13 RX ORDER — ALBUTEROL SULFATE 90 UG/1
2 INHALANT RESPIRATORY (INHALATION) EVERY 6 HOURS PRN
COMMUNITY

## 2025-01-13 NOTE — PROGRESS NOTES
Follow all instructions given by your physician  If Urology case Call [phone] the weekday before procedure to find out Arrival Time.  Do not eat or drink anything after midnight prior to surgery(includes water, chewing gum, mints and ice chips)  Sips of water am of surgery with allowed medications  May brush teeth   Do not smoke or chew tobacco, drink alcoholic beverages or use any illicit drugs for 24 hours prior to surgery  Bring insurance info and photo ID  Bring pertinent paperwork with you from Doctor or surgeons's office  Wear clean comfortable, loose-fitting clothing  No make-up, nail polish, jewelry, piercings, or contact lenses to be worn day of surgery  No glue on dentures morning of surgery; you will be asked to remove them for surgery. Case for glasses.  Shower the night before and the morning of surgery with cleansing soap provided or a liquid antibacterial soap, dry with new fresh clean towel after each shower, no lotions, creams or powder.  Clean sheets and pillowcase on bed night before surgery  Bring medications in original bottles, Bring rescue inhalers with you  Bring CPAP/BIPAP machine if you have one ( you may be charged if one is needed in recovery room )  No    Do you have a DNR?   Please Bring Healthcare Directive or Healthcare Power of  in so we can scan it into your chart.  No     Our pharmacy has a Meds to Beds program where they will deliver any new prescriptions you may have to your room before you leave. Our Pharmacy will clear it through your insurance; for example (same co pay). This enables you to take your new RX as soon as you need when you get home and avoids stop/wait delays on the way home.  Please have a form of payment with you and have someone designated as your Pharmacy contact with their phone # as you may not feel well or still be under the influence of anesthesia.    Please refer to the SSI-Surgical Site Infection Flyer you hopefully received in the

## 2025-01-13 NOTE — PROGRESS NOTES
PAT call attempted, patient unavailable, left message to please call us back at your earliest convenience; [phone]    Pt asked for PAT to call back in about 30 minutes.

## 2025-01-20 NOTE — PROGRESS NOTES
Asked if they was getting cardiac clearance because for new R BBB on EKG   Response We don't seek clearance from PCP unless it is a robot case.  Since patient does not follow with a cardiologist we did not seek clearance for surgery

## 2025-01-22 ENCOUNTER — ANESTHESIA EVENT (OUTPATIENT)
Dept: OPERATING ROOM | Age: 65
End: 2025-01-22
Payer: COMMERCIAL

## 2025-01-22 ENCOUNTER — HOSPITAL ENCOUNTER (OUTPATIENT)
Age: 65
Setting detail: OUTPATIENT SURGERY
Discharge: HOME | End: 2025-01-22
Attending: UROLOGY | Admitting: UROLOGY
Payer: COMMERCIAL

## 2025-01-22 ENCOUNTER — ANESTHESIA (OUTPATIENT)
Dept: OPERATING ROOM | Age: 65
End: 2025-01-22
Payer: COMMERCIAL

## 2025-01-22 VITALS
DIASTOLIC BLOOD PRESSURE: 84 MMHG | BODY MASS INDEX: 28.93 KG/M2 | HEIGHT: 66 IN | RESPIRATION RATE: 16 BRPM | SYSTOLIC BLOOD PRESSURE: 146 MMHG | TEMPERATURE: 96.9 F | HEART RATE: 76 BPM | WEIGHT: 180 LBS | OXYGEN SATURATION: 92 %

## 2025-01-22 DIAGNOSIS — G89.28 CHRONIC POST-OPERATIVE PAIN: Primary | ICD-10-CM

## 2025-01-22 LAB — POTASSIUM SERPL-SCNC: 4.7 MEQ/L (ref 3.5–5.2)

## 2025-01-22 PROCEDURE — 7100000011 HC PHASE II RECOVERY - ADDTL 15 MIN: Performed by: UROLOGY

## 2025-01-22 PROCEDURE — 3600000002 HC SURGERY LEVEL 2 BASE: Performed by: UROLOGY

## 2025-01-22 PROCEDURE — 6360000002 HC RX W HCPCS: Performed by: STUDENT IN AN ORGANIZED HEALTH CARE EDUCATION/TRAINING PROGRAM

## 2025-01-22 PROCEDURE — 3700000001 HC ADD 15 MINUTES (ANESTHESIA): Performed by: UROLOGY

## 2025-01-22 PROCEDURE — 7100000010 HC PHASE II RECOVERY - FIRST 15 MIN: Performed by: UROLOGY

## 2025-01-22 PROCEDURE — 6370000000 HC RX 637 (ALT 250 FOR IP): Performed by: UROLOGY

## 2025-01-22 PROCEDURE — 6360000002 HC RX W HCPCS

## 2025-01-22 PROCEDURE — 6360000002 HC RX W HCPCS: Performed by: UROLOGY

## 2025-01-22 PROCEDURE — 2709999900 HC NON-CHARGEABLE SUPPLY: Performed by: UROLOGY

## 2025-01-22 PROCEDURE — 7100000000 HC PACU RECOVERY - FIRST 15 MIN: Performed by: UROLOGY

## 2025-01-22 PROCEDURE — 2580000003 HC RX 258: Performed by: UROLOGY

## 2025-01-22 PROCEDURE — 84132 ASSAY OF SERUM POTASSIUM: CPT

## 2025-01-22 PROCEDURE — 36415 COLL VENOUS BLD VENIPUNCTURE: CPT

## 2025-01-22 PROCEDURE — 7100000001 HC PACU RECOVERY - ADDTL 15 MIN: Performed by: UROLOGY

## 2025-01-22 PROCEDURE — 3600000012 HC SURGERY LEVEL 2 ADDTL 15MIN: Performed by: UROLOGY

## 2025-01-22 PROCEDURE — 3700000000 HC ANESTHESIA ATTENDED CARE: Performed by: UROLOGY

## 2025-01-22 PROCEDURE — 2500000003 HC RX 250 WO HCPCS: Performed by: UROLOGY

## 2025-01-22 RX ORDER — FENTANYL CITRATE 50 UG/ML
INJECTION, SOLUTION INTRAMUSCULAR; INTRAVENOUS
Status: COMPLETED
Start: 2025-01-22 | End: 2025-01-22

## 2025-01-22 RX ORDER — HYDROCODONE BITARTRATE AND ACETAMINOPHEN 5; 325 MG/1; MG/1
1 TABLET ORAL ONCE
Status: COMPLETED | OUTPATIENT
Start: 2025-01-22 | End: 2025-01-22

## 2025-01-22 RX ORDER — HYDRALAZINE HYDROCHLORIDE 20 MG/ML
10 INJECTION INTRAMUSCULAR; INTRAVENOUS
Status: DISCONTINUED | OUTPATIENT
Start: 2025-01-22 | End: 2025-01-22 | Stop reason: HOSPADM

## 2025-01-22 RX ORDER — HYDRALAZINE HYDROCHLORIDE 20 MG/ML
INJECTION INTRAMUSCULAR; INTRAVENOUS
Status: COMPLETED
Start: 2025-01-22 | End: 2025-01-22

## 2025-01-22 RX ORDER — HYDROCODONE BITARTRATE AND ACETAMINOPHEN 5; 325 MG/1; MG/1
1 TABLET ORAL EVERY 6 HOURS PRN
Qty: 15 TABLET | Refills: 0 | Status: SHIPPED | OUTPATIENT
Start: 2025-01-22 | End: 2025-01-27

## 2025-01-22 RX ORDER — SODIUM CHLORIDE 9 MG/ML
INJECTION, SOLUTION INTRAVENOUS PRN
Status: DISCONTINUED | OUTPATIENT
Start: 2025-01-22 | End: 2025-01-22 | Stop reason: HOSPADM

## 2025-01-22 RX ORDER — ONDANSETRON 2 MG/ML
INJECTION INTRAMUSCULAR; INTRAVENOUS
Status: DISCONTINUED | OUTPATIENT
Start: 2025-01-22 | End: 2025-01-22 | Stop reason: SDUPTHER

## 2025-01-22 RX ORDER — SODIUM CHLORIDE 0.9 % (FLUSH) 0.9 %
5-40 SYRINGE (ML) INJECTION PRN
Status: DISCONTINUED | OUTPATIENT
Start: 2025-01-22 | End: 2025-01-22 | Stop reason: HOSPADM

## 2025-01-22 RX ORDER — SODIUM CHLORIDE 0.9 % (FLUSH) 0.9 %
5-40 SYRINGE (ML) INJECTION EVERY 12 HOURS SCHEDULED
Status: DISCONTINUED | OUTPATIENT
Start: 2025-01-22 | End: 2025-01-22 | Stop reason: HOSPADM

## 2025-01-22 RX ORDER — PROPOFOL 10 MG/ML
INJECTION, EMULSION INTRAVENOUS
Status: DISCONTINUED | OUTPATIENT
Start: 2025-01-22 | End: 2025-01-22 | Stop reason: SDUPTHER

## 2025-01-22 RX ORDER — MIDAZOLAM HYDROCHLORIDE 1 MG/ML
INJECTION, SOLUTION INTRAMUSCULAR; INTRAVENOUS
Status: DISCONTINUED | OUTPATIENT
Start: 2025-01-22 | End: 2025-01-22 | Stop reason: SDUPTHER

## 2025-01-22 RX ORDER — KETOROLAC TROMETHAMINE 30 MG/ML
INJECTION, SOLUTION INTRAMUSCULAR; INTRAVENOUS
Status: DISCONTINUED | OUTPATIENT
Start: 2025-01-22 | End: 2025-01-22 | Stop reason: SDUPTHER

## 2025-01-22 RX ORDER — GLYCOPYRROLATE 0.2 MG/ML
INJECTION INTRAMUSCULAR; INTRAVENOUS
Status: DISCONTINUED | OUTPATIENT
Start: 2025-01-22 | End: 2025-01-22 | Stop reason: SDUPTHER

## 2025-01-22 RX ORDER — DOXYCYCLINE HYCLATE 100 MG
100 TABLET ORAL 2 TIMES DAILY
Qty: 10 TABLET | Refills: 0 | Status: SHIPPED | OUTPATIENT
Start: 2025-01-22 | End: 2025-01-27

## 2025-01-22 RX ORDER — DEXAMETHASONE SODIUM PHOSPHATE 10 MG/ML
INJECTION, EMULSION INTRAMUSCULAR; INTRAVENOUS
Status: DISCONTINUED | OUTPATIENT
Start: 2025-01-22 | End: 2025-01-22 | Stop reason: SDUPTHER

## 2025-01-22 RX ORDER — FENTANYL CITRATE 50 UG/ML
50 INJECTION, SOLUTION INTRAMUSCULAR; INTRAVENOUS EVERY 5 MIN PRN
Status: DISCONTINUED | OUTPATIENT
Start: 2025-01-22 | End: 2025-01-22 | Stop reason: HOSPADM

## 2025-01-22 RX ORDER — FENTANYL CITRATE 50 UG/ML
INJECTION, SOLUTION INTRAMUSCULAR; INTRAVENOUS
Status: DISCONTINUED | OUTPATIENT
Start: 2025-01-22 | End: 2025-01-22 | Stop reason: SDUPTHER

## 2025-01-22 RX ORDER — LIDOCAINE HCL/PF 100 MG/5ML
SYRINGE (ML) INJECTION
Status: DISCONTINUED | OUTPATIENT
Start: 2025-01-22 | End: 2025-01-22 | Stop reason: SDUPTHER

## 2025-01-22 RX ADMIN — HYDROCODONE BITARTRATE AND ACETAMINOPHEN 1 TABLET: 5; 325 TABLET ORAL at 16:04

## 2025-01-22 RX ADMIN — HYDRALAZINE HYDROCHLORIDE 10 MG: 20 INJECTION INTRAMUSCULAR; INTRAVENOUS at 14:56

## 2025-01-22 RX ADMIN — FENTANYL CITRATE 50 MCG: 50 INJECTION, SOLUTION INTRAMUSCULAR; INTRAVENOUS at 13:48

## 2025-01-22 RX ADMIN — DEXAMETHASONE SODIUM PHOSPHATE 10 MG: 10 INJECTION, EMULSION INTRAMUSCULAR; INTRAVENOUS at 13:44

## 2025-01-22 RX ADMIN — ONDANSETRON 4 MG: 2 INJECTION INTRAMUSCULAR; INTRAVENOUS at 14:05

## 2025-01-22 RX ADMIN — PROPOFOL 150 MG: 10 INJECTION, EMULSION INTRAVENOUS at 13:39

## 2025-01-22 RX ADMIN — KETOROLAC TROMETHAMINE 30 MG: 30 INJECTION, SOLUTION INTRAMUSCULAR at 14:04

## 2025-01-22 RX ADMIN — Medication 100 MG: at 13:39

## 2025-01-22 RX ADMIN — WATER 2000 MG: 1 INJECTION INTRAMUSCULAR; INTRAVENOUS; SUBCUTANEOUS at 13:46

## 2025-01-22 RX ADMIN — FENTANYL CITRATE 50 MCG: 50 INJECTION, SOLUTION INTRAMUSCULAR; INTRAVENOUS at 14:35

## 2025-01-22 RX ADMIN — MIDAZOLAM 2 MG: 1 INJECTION INTRAMUSCULAR; INTRAVENOUS at 13:34

## 2025-01-22 RX ADMIN — HYDROMORPHONE HYDROCHLORIDE 0.5 MG: 1 INJECTION, SOLUTION INTRAMUSCULAR; INTRAVENOUS; SUBCUTANEOUS at 14:51

## 2025-01-22 RX ADMIN — GLYCOPYRROLATE 0.2 MG: 0.2 INJECTION INTRAMUSCULAR; INTRAVENOUS at 14:00

## 2025-01-22 RX ADMIN — FENTANYL CITRATE 50 MCG: 50 INJECTION, SOLUTION INTRAMUSCULAR; INTRAVENOUS at 14:30

## 2025-01-22 RX ADMIN — HYDROMORPHONE HYDROCHLORIDE 0.5 MG: 1 INJECTION, SOLUTION INTRAMUSCULAR; INTRAVENOUS; SUBCUTANEOUS at 14:46

## 2025-01-22 RX ADMIN — SODIUM CHLORIDE: 9 INJECTION, SOLUTION INTRAVENOUS at 13:20

## 2025-01-22 RX ADMIN — FENTANYL CITRATE 25 MCG: 50 INJECTION, SOLUTION INTRAMUSCULAR; INTRAVENOUS at 14:13

## 2025-01-22 RX ADMIN — FENTANYL CITRATE 25 MCG: 50 INJECTION, SOLUTION INTRAMUSCULAR; INTRAVENOUS at 13:39

## 2025-01-22 ASSESSMENT — PAIN - FUNCTIONAL ASSESSMENT
PAIN_FUNCTIONAL_ASSESSMENT: ACTIVITIES ARE NOT PREVENTED
PAIN_FUNCTIONAL_ASSESSMENT: 0-10
PAIN_FUNCTIONAL_ASSESSMENT: WONG-BAKER FACES
PAIN_FUNCTIONAL_ASSESSMENT: 0-10
PAIN_FUNCTIONAL_ASSESSMENT: 0-10
PAIN_FUNCTIONAL_ASSESSMENT: ACTIVITIES ARE NOT PREVENTED
PAIN_FUNCTIONAL_ASSESSMENT: 0-10

## 2025-01-22 ASSESSMENT — PAIN DESCRIPTION - DESCRIPTORS
DESCRIPTORS: ACHING;SORE
DESCRIPTORS: ACHING
DESCRIPTORS: ACHING
DESCRIPTORS: SORE;DISCOMFORT

## 2025-01-22 ASSESSMENT — PAIN SCALES - GENERAL
PAINLEVEL_OUTOF10: 7
PAINLEVEL_OUTOF10: 7
PAINLEVEL_OUTOF10: 5
PAINLEVEL_OUTOF10: 7
PAINLEVEL_OUTOF10: 7
PAINLEVEL_OUTOF10: 3

## 2025-01-22 ASSESSMENT — PAIN DESCRIPTION - PAIN TYPE: TYPE: SURGICAL PAIN

## 2025-01-22 ASSESSMENT — PAIN DESCRIPTION - LOCATION: LOCATION: SCROTUM

## 2025-01-22 ASSESSMENT — PAIN DESCRIPTION - FREQUENCY: FREQUENCY: CONTINUOUS

## 2025-01-22 ASSESSMENT — PAIN SCALES - WONG BAKER
WONGBAKER_NUMERICALRESPONSE: HURTS A LITTLE BIT
WONGBAKER_NUMERICALRESPONSE: NO HURT

## 2025-01-22 ASSESSMENT — PAIN DESCRIPTION - ONSET: ONSET: ON-GOING

## 2025-01-22 NOTE — OP NOTE
Dr. Jason Pruitt MD  Urologic Surgery      [place], [place]. [facility]  01/22/25    Patient:  Richi Proctor  MRN: [id number]  YOB: 1960    Surgeon: Dr. Jason Pruitt MD  Assistant: None    Pre-op Diagnosis: Left Hydrocele  Post-op Diagnosis: Same    Procedure:   Left Hydrocelectomy    Anesthesia: General  Complications: None  OR Blood Loss: Minimal  Fluids: Cystalloids  Specimens:  * No specimens in log *    Indication:  64-year-old male with large left hydrocele.  Presents today for repair.    Narrative of the Procedure:    After informed consent was obtained in the preoperative area, the patient was taken back to the operating room and transferred to the operating table in supine position.  EPC cuffs were placed. The machine was turned on.  Anesthesia was induced and antibiotics were given.  A timeout occurred.  Two patient identifiers were used. A transverse scrotal incision was made over the left hemiscrotum.  The dartos was divided and the tunica vaginalis was cleaned off. The testis was delivered. The vaginalis was then opened and the hydrocele was evacuated.  The testis was inspected.      The vaginalis was then everted with  2-0 Vicryl. Exquisite hemostasis was achieved globally. The lateral sulcus was located and the testicle was placed back in the scrotum in proper position. The dartos layer was closed with  2-0 Vicryl suture. The skin was closed with  3-0 Vicryl.  Dermabond was placed on the wound. All needle counts were correct. The patient was then awakened and discharged back to the PACU in good and stable condition.    Follow-Up: Patient to see [facility] mederos in 4 weeks.    Jason Pruitt MD  Electronically signed on 1/22/2025 at 2:09 PM

## 2025-01-22 NOTE — H&P
Dr. Jason Pruitt MD  [facility]  Urology Clinic      Patient:  Richi Proctor  YOB: 1960  Date: 1/22/2025    HISTORY OF PRESENT ILLNESS:   The patient is a 64 y.o. male who presents today for evaluation of the following problem(s): Elevated PSA and BPH. No family history of prostate cancer. Severe LUTS from BPH. He had prostate biopsy in late 2020 and this showed [place] 6 prostate cancer. We discussed all options with him today. Prostate was 68g on biopsy. Cysto showed severe BPH with median lobe. We proceeded with TURP. All tissues were negative for cancer. Today he is doing very well. Very happy. Nov 2023 his left testis has hydrocele.     Overall the problem(s) : are stable.  Associated Symptoms: No dysuria, gross hematuria.  Pain Severity: 0/10    Summary of old records:   Marilu 6 Late 2020 in 1 core. GPS oncotype showed 23 very low risk.   Marilu 6 early 2022. Only 6% of one core showed Marilu 6 prostate cancer.    Imaging/Labs reviewed during today's visit:  I have independently reviewed and verified the following films during today's visit.  MRI of the prostate.     Last several PSA's:  Lab Results   Component Value Date    PSA 5.14 (H) 11/13/2024    PSA 4.50 (H) 05/13/2024    PSA 5.69 (H) 10/04/2023       Last total testosterone:  No results found for: \"TESTOSTERONE\"    Urinalysis today:  No results found for this visit on 05/15/24.      Last BUN and creatinine:  Lab Results   Component Value Date    BUN 13 11/13/2024     Lab Results   Component Value Date    CREATININE 0.9 11/13/2024       Imaging Reviewed during this Office Visit:   (results were independently reviewed by physician and radiology report verified)    PAST MEDICAL, FAMILY AND SOCIAL HISTORY:  Past Medical History:   Diagnosis Date    Asthma     Cancer (HCC)     prostate cancer, skin cancer on right hand - removed    Difficult intubation     in 8/25/2020 difficult to intubate when he had cervical sx; also in 2023

## 2025-01-22 NOTE — ANESTHESIA PRE PROCEDURE
Department of Anesthesiology  Preprocedure Note       Name:  Richi Proctor   Age:  64 y.o.  :  1960                                          MRN:  [id number]         Date:  2025      Surgeon: Surgeon(s):  Jason Pruitt MD    Procedure: Procedure(s):  LEFT HYDROCELECTOMY    Medications prior to admission:   Prior to Admission medications    Medication Sig Start Date End Date Taking? Authorizing Provider   cholecalciferol (VITAMIN D3) 400 UNIT TABS tablet Take 1 tablet by mouth daily   Yes ProviderBrad MD   albuterol sulfate HFA (VENTOLIN HFA) 108 (90 Base) MCG/ACT inhaler Inhale 2 puffs into the lungs every 6 hours as needed for Wheezing   Yes ProviderBrad MD   amLODIPine (NORVASC) 5 MG tablet Take 1 tablet by mouth daily  Patient taking differently: Take 1 tablet by mouth at bedtime 24  Yes Eloy Lopez MD   celecoxib (CELEBREX) 100 MG capsule Take 1 capsule by mouth 2 times daily 24  Yes Eloy Lopez MD   lisinopril (PRINIVIL;ZESTRIL) 20 MG tablet Take 1 tablet by mouth daily 24  Yes Eloy Lopez MD   traZODone (DESYREL) 50 MG tablet Take 1 tablet by mouth nightly 1 tablet by mouth nightly 24 Yes Eloy Lopez MD   cloNIDine (CATAPRES) 0.1 MG tablet Take 1 tablet by mouth 2 times daily 24  Yes Eloy Lopez MD   traMADol (ULTRAM) 50 MG tablet Take 1 tablet by mouth every 6 hours as needed for Pain for up to 180 days. Intended supply: 7 days. Take lowest dose possible to manage pain Max Daily Amount: 200 mg 24 Yes Eloy Lopez MD   predniSONE (DELTASONE) 5 MG tablet Take 1 tablet by mouth daily 24   Eloy Lopez MD   tadalafil (CIALIS) 20 MG tablet TAKE 1 TABLET BY MOUTH DAILY AS NEEDED FOR ERECTILE DYSFUNCTION  Patient not taking: Reported on 12/10/2024 6/17/24   Moose Wright PA-C       Current medications:    Current Facility-Administered Medications   Medication Dose Route Frequency Provider Last Rate Last

## 2025-01-22 NOTE — ANESTHESIA POSTPROCEDURE EVALUATION
Department of Anesthesiology  Postprocedure Note    Patient: Richi Proctor  MRN: [id number]  YOB: 1960  Date of evaluation: 1/22/2025    Procedure Summary       Date: 01/22/25 Room / Location: [facility] OR 08 / [facility] OR    Anesthesia Start: 1333 Anesthesia Stop: 1421    Procedure: LEFT HYDROCELECTOMY (Left) Diagnosis:       Hydrocele, left      (Hydrocele, left [N43.3])    Surgeons: Jason Pruitt MD Responsible Provider: Yfn Jang DO    Anesthesia Type: general ASA Status: 2            Anesthesia Type: No value filed.    Juvenal Phase I: Juvenal Score: 10    Juvenal Phase II:      Anesthesia Post Evaluation    Patient location during evaluation: PACU  Patient participation: complete - patient participated  Level of consciousness: awake and alert  Airway patency: patent  Nausea & Vomiting: no vomiting and no nausea  Cardiovascular status: hemodynamically stable  Respiratory status: acceptable  Hydration status: stable  Pain management: adequate    No notable events documented.

## 2025-01-22 NOTE — DISCHARGE INSTRUCTIONS
Regular diet.  Resume home medications except for blood thinners.  Okay to resume Celebrex on Friday.  No heavy lifting for 2 weeks.  Ice to the area and wear compressive underwear.  No heavy lifting.

## 2025-01-22 NOTE — PROGRESS NOTES
1418 pt arrived to PACU, unresponsive on arrival. OPA in place. VSS. Appears in no acute distress at this time. Small amount of drainage present on mesh panties  1430 pt awake in bed, state alla 7/10. Medicated with 50 mcg fentanyl  1435 no change in pain status, medicated with 50 mcg fentanyl  1440 pt states pain 4/10 and tolerable. VSS  1446 c/o pain 7/10, medicated with 0.5 mg dilaudid  1451 no change in pain status, medicated with 0.5 mg dilaudid  1456 BP elevated, medicated with 10 mg hydralazine. Pt states pain 4/10 and tolerable. VSS  1505 pt resting, resp easy. VSS  1516 meets criteria for discharge from PACU, transported to [facility] in stable condition

## 2025-01-29 ENCOUNTER — TELEPHONE (OUTPATIENT)
Dept: UROLOGY | Age: 65
End: 2025-01-29

## 2025-01-29 NOTE — TELEPHONE ENCOUNTER
Patient c/o a lot of sensitivity and pain if he touches or bumbs the testicle rated 7-8 otherwise he has a dull ache. He feels the sensitivity internal and not at the surgical site.    The surgical site is healing, denies redness, hardness, edema, fever, or chills.    He wanted to make sure nothing is twisted or wrong with the testicle.    Underwent Left Hydrocelectomy on 01/22/2025

## 2025-01-29 NOTE — TELEPHONE ENCOUNTER
Common to have some pain and sensitivity following hydrocelectomy.    If pain continues can move appt with [facility] sooner or go to ER for further evaluation.     Recommend to elevate scrotum.

## 2025-02-05 ENCOUNTER — OFFICE VISIT (OUTPATIENT)
Dept: UROLOGY | Age: 65
End: 2025-02-05

## 2025-02-05 VITALS — RESPIRATION RATE: 12 BRPM | BODY MASS INDEX: 28.93 KG/M2 | HEIGHT: 66 IN | WEIGHT: 180 LBS

## 2025-02-05 DIAGNOSIS — N40.1 BPH WITH OBSTRUCTION/LOWER URINARY TRACT SYMPTOMS: ICD-10-CM

## 2025-02-05 DIAGNOSIS — R10.32 LEFT INGUINAL PAIN: ICD-10-CM

## 2025-02-05 DIAGNOSIS — C61 PROSTATE CANCER (HCC): ICD-10-CM

## 2025-02-05 DIAGNOSIS — N43.3 HYDROCELE, LEFT: Primary | ICD-10-CM

## 2025-02-05 DIAGNOSIS — N13.8 BPH WITH OBSTRUCTION/LOWER URINARY TRACT SYMPTOMS: ICD-10-CM

## 2025-02-05 PROCEDURE — 99024 POSTOP FOLLOW-UP VISIT: CPT

## 2025-02-05 NOTE — PROGRESS NOTES
[facility] [place] [facility]  [facility] UROLOGY  [address]  [address]  [facility] [address]  Dept: [phone]  Loc: [phone]  Visit Date: 2/5/2025      HPI  Richi Proctor is a 64 y.o. male that presents to the urology clinic for left hydrocele follow-up.    Left Hydrocele & Left Inguinal Pain  Present for > 2 years. Cosmetically bothersome and causing left inguinal pain.     Now S/P Left Hydrocelectomy by Dr. Pruitt on 01/22/25 and doing very well. Incision healed. Swelling completely resolved. Patient is very pleased with cosmetic outcome.    Prostate CA (HCC)  Active Surveillance. Marilu 3+3=6, low volume. Detected in 2020 on TRUS Bx and re-demonstrated in April 2022. PSA of 6.19 at time of initial detection.    BPH w/ LUTS  S/P TURP in December of 2020 by Dr. Pruitt. Doing well post-operatively. Stream remains strong. Denies hesitancy, straining, feelings of incomplete emptying.      PSA Trend  5.14   (11/13/24)  4.50   (05/13/24)  5.69   (10/04/23)  5.07   (09/27/22)  4.29   (06/14/21)  4.31   (06/07/21)      Last BUN and Creatinine:  Lab Results   Component Value Date    BUN 13 11/13/2024     Lab Results   Component Value Date    CREATININE 0.9 11/13/2024           PAST MEDICAL, FAMILY AND SOCIAL HISTORY UPDATE:  Past Medical History:   Diagnosis Date    Asthma     Cancer (HCC)     prostate cancer, skin cancer on right hand - removed    Difficult intubation     in 8/25/2020 difficult to intubate when he had cervical sx; also in 2023 difficulty - PLEASE USE SMALLER EQUIPMENT    Hypertension     Rheumatoid arthritis (HCC)     Stomach ulcer      Past Surgical History:   Procedure Laterality Date    BACK SURGERY  08/2020    [place]     CARPAL TUNNEL RELEASE Left 03/05/2020    CERVICAL DISC SURGERY      fusion in 2005    HYDROCELE EXCISION Left 1/22/2025    LEFT HYDROCELECTOMY performed by Jason Pruitt MD at [facility] OR    NECK SURGERY  2006    fusion C3 C4 C5    ROTATOR CUFF REPAIR

## 2025-03-07 ENCOUNTER — TELEPHONE (OUTPATIENT)
Dept: UROLOGY | Age: 65
End: 2025-03-07

## 2025-03-07 DIAGNOSIS — N43.3 HYDROCELE, LEFT: Primary | ICD-10-CM

## 2025-03-07 NOTE — TELEPHONE ENCOUNTER
Patient underwent Left Hydrocelectomy on 01/22/2025.  Patient still has stitches sticking out. There is a spot beside them that feels like a bee sting that is red smaller than a pea.     Pain is continuous. He can't tell if there is increase warmth. Denies drainage.    Advised to be evaluated in the ER. He does not want to go to the ER and scheduled appointment Monday.

## 2025-03-07 NOTE — TELEPHONE ENCOUNTER
Will evaluate on Monday. Advise patient to keep the area covered, clean, and dry and not to manipulate the suture.

## 2025-03-10 ENCOUNTER — OFFICE VISIT (OUTPATIENT)
Dept: UROLOGY | Age: 65
End: 2025-03-10

## 2025-03-10 VITALS — WEIGHT: 180 LBS | BODY MASS INDEX: 28.93 KG/M2 | HEIGHT: 66 IN | RESPIRATION RATE: 10 BRPM

## 2025-03-10 DIAGNOSIS — N40.1 BPH WITH OBSTRUCTION/LOWER URINARY TRACT SYMPTOMS: ICD-10-CM

## 2025-03-10 DIAGNOSIS — R10.32 LEFT INGUINAL PAIN: ICD-10-CM

## 2025-03-10 DIAGNOSIS — N43.3 HYDROCELE, LEFT: Primary | ICD-10-CM

## 2025-03-10 DIAGNOSIS — C61 PROSTATE CANCER (HCC): ICD-10-CM

## 2025-03-10 DIAGNOSIS — N13.8 BPH WITH OBSTRUCTION/LOWER URINARY TRACT SYMPTOMS: ICD-10-CM

## 2025-03-10 PROCEDURE — 99024 POSTOP FOLLOW-UP VISIT: CPT

## 2025-03-10 NOTE — PROGRESS NOTES
[facility] PHYSICIANS [place] SPECIALTY  [facility] UROLOGY  [address]  [address]  [facility] [address]  Dept: [phone]  Loc: [phone]  Visit Date: 3/10/2025      HPI  Richi Proctor is a 64 y.o. male that presents to the urology clinic for left hydrocele follow-up.    Left Hydrocele & Left Inguinal Pain  Present for > 2 years. Cosmetically bothersome and causing left inguinal pain.     Now S/P Left Hydrocelectomy by Dr. Pruitt on 01/22/25 and doing very well. Incision healed. Sutures protruding, however, and causing discomfort.    Prostate CA (HCC)  Active Surveillance. Marilu 3+3=6, low volume. Detected in 2020 on TRUS Bx and re-demonstrated in April 2022. PSA of 6.19 at time of initial detection.    BPH w/ LUTS  S/P TURP in December of 2020 by Dr. Pruitt. Doing well post-operatively. Stream remains strong. Denies hesitancy, straining, feelings of incomplete emptying.      PSA Trend  5.14   (11/13/24)  4.50   (05/13/24)  5.69   (10/04/23)  5.07   (09/27/22)  4.29   (06/14/21)  4.31   (06/07/21)      Last BUN and Creatinine:  Lab Results   Component Value Date    BUN 13 11/13/2024     Lab Results   Component Value Date    CREATININE 0.9 11/13/2024           PAST MEDICAL, FAMILY AND SOCIAL HISTORY UPDATE:  Past Medical History:   Diagnosis Date    Asthma     Cancer (HCC)     prostate cancer, skin cancer on right hand - removed    Difficult intubation     in 8/25/2020 difficult to intubate when he had cervical sx; also in 2023 difficulty - PLEASE USE SMALLER EQUIPMENT    Hypertension     Rheumatoid arthritis (HCC)     Stomach ulcer      Past Surgical History:   Procedure Laterality Date    BACK SURGERY  08/2020    [place]     CARPAL TUNNEL RELEASE Left 03/05/2020    CERVICAL DISC SURGERY      fusion in 2005    HYDROCELE EXCISION Left 1/22/2025    LEFT HYDROCELECTOMY performed by Jason Pruitt MD at [facility] OR    NECK SURGERY  2006    fusion C3 C4 C5    ROTATOR CUFF REPAIR Right 12/21/2023

## 2025-03-14 ENCOUNTER — HOSPITAL ENCOUNTER (OUTPATIENT)
Age: 65
Discharge: HOME | End: 2025-03-14
Payer: COMMERCIAL

## 2025-03-14 ENCOUNTER — HOSPITAL ENCOUNTER (OUTPATIENT)
Dept: GENERAL RADIOLOGY | Age: 65
Discharge: HOME | End: 2025-03-14
Payer: COMMERCIAL

## 2025-03-14 PROCEDURE — 73130 X-RAY EXAM OF HAND: CPT

## 2025-04-28 ENCOUNTER — LAB (OUTPATIENT)
Dept: LAB | Age: 65
End: 2025-04-28

## 2025-04-28 ENCOUNTER — TELEPHONE (OUTPATIENT)
Dept: UROLOGY | Age: 65
End: 2025-04-28

## 2025-04-28 DIAGNOSIS — N50.812 PAIN IN LEFT TESTICLE: Primary | ICD-10-CM

## 2025-04-28 LAB
BASOPHILS ABSOLUTE: 0.1 THOU/MM3 (ref 0–0.1)
BASOPHILS NFR BLD AUTO: 0.6 %
DEPRECATED RDW RBC AUTO: 44.3 FL (ref 35–45)
EOSINOPHIL NFR BLD AUTO: 1.2 %
EOSINOPHILS ABSOLUTE: 0.1 THOU/MM3 (ref 0–0.4)
ERYTHROCYTE [DISTWIDTH] IN BLOOD BY AUTOMATED COUNT: 13.1 % (ref 11.5–14.5)
HCT VFR BLD AUTO: 41.9 % (ref 42–52)
HGB BLD-MCNC: 13.8 GM/DL (ref 14–18)
IMM GRANULOCYTES # BLD AUTO: 0.03 THOU/MM3 (ref 0–0.07)
IMM GRANULOCYTES NFR BLD AUTO: 0.3 %
LYMPHOCYTES ABSOLUTE: 1.3 THOU/MM3 (ref 1–4.8)
LYMPHOCYTES NFR BLD AUTO: 15.5 %
MCH RBC QN AUTO: 30.1 PG (ref 26–33)
MCHC RBC AUTO-ENTMCNC: 32.9 GM/DL (ref 32.2–35.5)
MCV RBC AUTO: 91.5 FL (ref 80–94)
MONOCYTES ABSOLUTE: 0.6 THOU/MM3 (ref 0.4–1.3)
MONOCYTES NFR BLD AUTO: 7.1 %
NEUTROPHILS ABSOLUTE: 6.5 THOU/MM3 (ref 1.8–7.7)
NEUTROPHILS NFR BLD AUTO: 75.3 %
NRBC BLD AUTO-RTO: 0 /100 WBC
PLATELET # BLD AUTO: 276 THOU/MM3 (ref 130–400)
PMV BLD AUTO: 10.5 FL (ref 9.4–12.4)
RBC # BLD AUTO: 4.58 MILL/MM3 (ref 4.7–6.1)
WBC # BLD AUTO: 8.6 THOU/MM3 (ref 4.8–10.8)

## 2025-04-28 NOTE — TELEPHONE ENCOUNTER
Patient c/o the left hydrocele is getting painful. The increases with coughing, sneezing, bowel movements and activity. He has a constant ache rated 5-6 on scale of 0-10.    It around tennis ball size and feels like there is 2 of them. He still constant bee sting on where the incision is on the scrotum which has been present since surgery.    He denies increase warmth redness, fever, or chills.    Patient scheduled appointment with DR Pruitt on 05/07/2025.    If imaging needs orders he would like to use [facility].    He has arm surgery on 04/30/2025

## 2025-05-05 ENCOUNTER — HOSPITAL ENCOUNTER (OUTPATIENT)
Dept: ULTRASOUND IMAGING | Age: 65
Discharge: HOME | End: 2025-05-05
Payer: COMMERCIAL

## 2025-05-05 DIAGNOSIS — N50.812 PAIN IN LEFT TESTICLE: ICD-10-CM

## 2025-05-05 PROCEDURE — 76870 US EXAM SCROTUM: CPT

## 2025-05-07 ENCOUNTER — OFFICE VISIT (OUTPATIENT)
Dept: UROLOGY | Age: 65
End: 2025-05-07
Payer: COMMERCIAL

## 2025-05-07 VITALS — HEIGHT: 66 IN | RESPIRATION RATE: 18 BRPM | BODY MASS INDEX: 28.93 KG/M2 | WEIGHT: 180 LBS

## 2025-05-07 DIAGNOSIS — N40.1 BPH WITH OBSTRUCTION/LOWER URINARY TRACT SYMPTOMS: ICD-10-CM

## 2025-05-07 DIAGNOSIS — N50.812 PAIN IN LEFT TESTICLE: Primary | ICD-10-CM

## 2025-05-07 DIAGNOSIS — N13.8 BPH WITH OBSTRUCTION/LOWER URINARY TRACT SYMPTOMS: ICD-10-CM

## 2025-05-07 DIAGNOSIS — R10.32 LEFT INGUINAL PAIN: ICD-10-CM

## 2025-05-07 DIAGNOSIS — N43.3 HYDROCELE, LEFT: ICD-10-CM

## 2025-05-07 DIAGNOSIS — C61 PROSTATE CANCER (HCC): ICD-10-CM

## 2025-05-07 PROCEDURE — 99214 OFFICE O/P EST MOD 30 MIN: CPT | Performed by: UROLOGY

## 2025-05-07 NOTE — PROGRESS NOTES
Dr. Jason Pruitt MD  [facility]  Urology Clinic      Patient:  Richi Proctor  YOB: 1960  Date: 5/7/2025    HISTORY OF PRESENT ILLNESS:   The patient is a 64 y.o. male who presents today for evaluation of the following problem(s): Elevated PSA and BPH. No family history of prostate cancer. Severe LUTS from BPH. He had prostate biopsy in late 2020 and this showed [place] 6 prostate cancer. We discussed all options with him today. Prostate was 68g on biopsy. Cysto showed severe BPH with median lobe. We proceeded with TURP. All tissues were negative for cancer. Today he is doing very well. Very happy. Nov 2023 his left testis has hydrocele.     Overall the problem(s) : are stable.  Associated Symptoms: No dysuria, gross hematuria.  Pain Severity: 0/10    Summary of old records:   [place] 6 Late 2020 in 1 core. GPS oncotype showed 23 very low risk.   [place] 6 early 2022. Only 6% of one core showed Marilu 6 prostate cancer.    Imaging/Labs reviewed during today's visit:  I have independently reviewed and verified the following films during today's visit.  MRI of the prostate.     Last several PSA's:  Lab Results   Component Value Date    PSA 5.14 (H) 11/13/2024    PSA 4.50 (H) 05/13/2024    PSA 5.69 (H) 10/04/2023       Last total testosterone:  No results found for: \"TESTOSTERONE\"    Urinalysis today:  No results found for this visit on 05/07/25.      Last BUN and creatinine:  Lab Results   Component Value Date    BUN 13 11/13/2024     Lab Results   Component Value Date    CREATININE 0.9 11/13/2024       Imaging Reviewed during this Office Visit:   (results were independently reviewed by physician and radiology report verified)    PAST MEDICAL, FAMILY AND SOCIAL HISTORY:  Past Medical History:   Diagnosis Date    Asthma     Cancer (HCC)     prostate cancer, skin cancer on right hand - removed    Difficult intubation     in 8/25/2020 difficult to intubate when he had cervical sx; also in 2023

## 2025-05-08 ENCOUNTER — TELEPHONE (OUTPATIENT)
Dept: UROLOGY | Age: 65
End: 2025-05-08

## 2025-05-08 ENCOUNTER — PREP FOR PROCEDURE (OUTPATIENT)
Dept: UROLOGY | Age: 65
End: 2025-05-08

## 2025-05-08 DIAGNOSIS — C61 PROSTATE CANCER (HCC): ICD-10-CM

## 2025-05-08 DIAGNOSIS — N43.3 HYDROCELE, LEFT: ICD-10-CM

## 2025-05-08 DIAGNOSIS — R10.32 LEFT INGUINAL PAIN: ICD-10-CM

## 2025-05-08 DIAGNOSIS — Z01.818 PRE-OP TESTING: ICD-10-CM

## 2025-05-08 DIAGNOSIS — N50.812 TESTICULAR PAIN, LEFT: ICD-10-CM

## 2025-05-08 DIAGNOSIS — N50.812 PAIN IN LEFT TESTICLE: Primary | ICD-10-CM

## 2025-05-08 RX ORDER — SODIUM CHLORIDE 0.9 % (FLUSH) 0.9 %
5-40 SYRINGE (ML) INJECTION EVERY 12 HOURS SCHEDULED
Status: CANCELLED | OUTPATIENT
Start: 2025-05-08

## 2025-05-08 RX ORDER — SODIUM CHLORIDE 0.9 % (FLUSH) 0.9 %
5-40 SYRINGE (ML) INJECTION PRN
Status: CANCELLED | OUTPATIENT
Start: 2025-05-08

## 2025-05-08 RX ORDER — SODIUM CHLORIDE 9 MG/ML
INJECTION, SOLUTION INTRAVENOUS PRN
Status: CANCELLED | OUTPATIENT
Start: 2025-05-08

## 2025-05-08 NOTE — TELEPHONE ENCOUNTER
Patient is scheduled for surgery with  on 5/15/2025. Surgery consent to be done on arrival. Patient to do pre op LABS ON 5/9/2025; ORDERS IN [facility], PATIENT VOICED UNDERSTANDING. Surgery instructions gone over with patient verbally OVER THE PHONE; PATIENT TO CALL OR DESK ON WED FOR ARRIVAL TIME; NPO AFTER MIDNIGHT ON WED; PATIENT VOICED UNDERSTANDING    Patient informed an adult over the age of 18 must be with them at the time of surgery and upon discharge

## 2025-05-09 ENCOUNTER — HOSPITAL ENCOUNTER (OUTPATIENT)
Age: 65
Discharge: HOME | End: 2025-05-09
Payer: COMMERCIAL

## 2025-05-09 LAB
ANION GAP SERPL CALC-SCNC: 10 MEQ/L (ref 8–16)
BASOPHILS ABSOLUTE: 0 THOU/MM3 (ref 0–0.1)
BASOPHILS NFR BLD AUTO: 0.6 %
BUN SERPL-MCNC: 20 MG/DL (ref 8–23)
CALCIUM SERPL-MCNC: 8.9 MG/DL (ref 8.8–10.2)
CHLORIDE SERPL-SCNC: 102 MEQ/L (ref 98–111)
CO2 SERPL-SCNC: 28 MEQ/L (ref 22–29)
CREAT SERPL-MCNC: 1.2 MG/DL (ref 0.7–1.2)
DEPRECATED RDW RBC AUTO: 42.5 FL (ref 35–45)
EOSINOPHIL NFR BLD AUTO: 1.7 %
EOSINOPHILS ABSOLUTE: 0.1 THOU/MM3 (ref 0–0.4)
ERYTHROCYTE [DISTWIDTH] IN BLOOD BY AUTOMATED COUNT: 12.8 % (ref 11.5–14.5)
GFR SERPL CREATININE-BSD FRML MDRD: 68 ML/MIN/1.73M2
GLUCOSE SERPL-MCNC: 100 MG/DL (ref 74–109)
HCT VFR BLD AUTO: 40.4 % (ref 42–52)
HGB BLD-MCNC: 13.1 GM/DL (ref 14–18)
IMM GRANULOCYTES # BLD AUTO: 0.02 THOU/MM3 (ref 0–0.07)
IMM GRANULOCYTES NFR BLD AUTO: 0.3 %
LYMPHOCYTES ABSOLUTE: 2 THOU/MM3 (ref 1–4.8)
LYMPHOCYTES NFR BLD AUTO: 28.6 %
MCH RBC QN AUTO: 29.5 PG (ref 26–33)
MCHC RBC AUTO-ENTMCNC: 32.4 GM/DL (ref 32.2–35.5)
MCV RBC AUTO: 91 FL (ref 80–94)
MONOCYTES ABSOLUTE: 0.7 THOU/MM3 (ref 0.4–1.3)
MONOCYTES NFR BLD AUTO: 9.4 %
NEUTROPHILS ABSOLUTE: 4.2 THOU/MM3 (ref 1.8–7.7)
NEUTROPHILS NFR BLD AUTO: 59.4 %
NRBC BLD AUTO-RTO: 0 /100 WBC
PLATELET # BLD AUTO: 264 THOU/MM3 (ref 130–400)
PMV BLD AUTO: 11 FL (ref 9.4–12.4)
POTASSIUM SERPL-SCNC: 4.1 MEQ/L (ref 3.5–5.2)
RBC # BLD AUTO: 4.44 MILL/MM3 (ref 4.7–6.1)
SODIUM SERPL-SCNC: 140 MEQ/L (ref 135–145)
WBC # BLD AUTO: 7 THOU/MM3 (ref 4.8–10.8)

## 2025-05-09 PROCEDURE — 80048 BASIC METABOLIC PNL TOTAL CA: CPT

## 2025-05-09 PROCEDURE — 36415 COLL VENOUS BLD VENIPUNCTURE: CPT

## 2025-05-09 PROCEDURE — 85025 COMPLETE CBC W/AUTO DIFF WBC: CPT

## 2025-05-09 NOTE — PROGRESS NOTES
PAT Call Date:  5/9   Surgery Date: 5/15    Surgeon: Ernesto   Surgery: left hydrocelectomy    Any Isolation Precautions? No      Type of Isolation Precaution: Not Applicable   Is patient from a nursing home? No  Name of Nursing Home:   Any equipment assist needed for moving patient? No   Type of Equipment: Not Applicable  Patient last weight: 180 lb     Hard Copy on Chart  In EPIC Pending/Notes   Consent -   Within 30 days; signed, dated & timed by patient and physician     [x] On Arrival     [] Blood      [] DNR   H&P -   Within 30 days  5/7  [] Physician To Do     Clearance -      []Medical     []Cardiac     [] Pulmonary    Orders -   Signed and Dated    5/8  [] Physician To Do    Labs -   Within 3 months   4/28  ordered  [x] CBC-ok    [x] BMP   [x] GFR   [] INR    [] PTT    [] Urine    [] Liver Enzymes    [] MRSA Nasal      Others:     Radiology Studies -   Within 1 year          5/5  [] Chest X-Ray   [] MRI    [] CT    [] Vascular   [x] US     Pulmonary -     [] ARIES   [] CPAP     Cardiac Workup -   Stress Test, Echo, Cath within 18 months  1/2  [x] EKG  -ok    [] Cath                 [] Stress Test                      [] Echo/EMA   [] CABG   [] Holter Monitor      [] Pacemaker/ICD        Brand:        Where does patient have checked:         Last check:         Rep Notified:

## 2025-05-09 NOTE — PROGRESS NOTES
Follow all instructions given by your physician  If Urology case Call [phone] the weekday before procedure to find out Arrival Time.  Do not eat or drink anything after midnight prior to surgery(includes water, chewing gum, mints and ice chips)  Sips of water am of surgery with allowed medications  May brush teeth   Do not smoke or chew tobacco, drink alcoholic beverages or use any illicit drugs for 24 hours prior to surgery  Bring insurance info and photo ID  Bring pertinent paperwork with you from Doctor or surgeons's office  Wear clean comfortable, loose-fitting clothing  No make-up, nail polish, jewelry, piercings, or contact lenses to be worn day of surgery  No glue on dentures morning of surgery; you will be asked to remove them for surgery. Case for glasses.  Shower the night before and the morning of surgery with cleansing soap provided or a liquid antibacterial soap, dry with new fresh clean towel after each shower, no lotions, creams or powder.  Clean sheets and pillowcase on bed night before surgery  Bring rescue inhalers with you  Bring CPAP/BIPAP machine if you have one ( you may be charged if one is needed in recovery room )    Do you have a DNR?   Please Bring Healthcare Directive or Healthcare Power of  in so we can scan it into your chart.    Our pharmacy has a Meds to Beds program where they will deliver any new prescriptions you may have to your room before you leave. Our Pharmacy will clear it through your insurance; for example (same co pay). This enables you to take your new RX as soon as you need when you get home and avoids stop/wait delays on the way home.  Please have a form of payment with you and have someone designated as your Pharmacy contact with their phone # as you may not feel well or still be under the influence of anesthesia.    Please refer to the SSI-Surgical Site Infection Flyer you hopefully received in the mail-together we can prevent infections; signs and

## 2025-05-09 NOTE — PROGRESS NOTES
PAT call attempted, patient unavailable, left message to please call us back at your earliest convenience; [phone]

## 2025-05-15 ENCOUNTER — ANESTHESIA (OUTPATIENT)
Dept: OPERATING ROOM | Age: 65
End: 2025-05-15
Payer: COMMERCIAL

## 2025-05-15 ENCOUNTER — HOSPITAL ENCOUNTER (OUTPATIENT)
Age: 65
Setting detail: OUTPATIENT SURGERY
Discharge: HOME | End: 2025-05-15
Attending: UROLOGY | Admitting: UROLOGY
Payer: COMMERCIAL

## 2025-05-15 ENCOUNTER — ANESTHESIA EVENT (OUTPATIENT)
Dept: OPERATING ROOM | Age: 65
End: 2025-05-15
Payer: COMMERCIAL

## 2025-05-15 VITALS
BODY MASS INDEX: 27.12 KG/M2 | RESPIRATION RATE: 16 BRPM | HEIGHT: 67 IN | SYSTOLIC BLOOD PRESSURE: 140 MMHG | TEMPERATURE: 97.8 F | OXYGEN SATURATION: 95 % | HEART RATE: 70 BPM | DIASTOLIC BLOOD PRESSURE: 70 MMHG | WEIGHT: 172.8 LBS

## 2025-05-15 PROCEDURE — 6360000002 HC RX W HCPCS: Performed by: UROLOGY

## 2025-05-15 PROCEDURE — 7100000010 HC PHASE II RECOVERY - FIRST 15 MIN: Performed by: UROLOGY

## 2025-05-15 PROCEDURE — 3600000012 HC SURGERY LEVEL 2 ADDTL 15MIN: Performed by: UROLOGY

## 2025-05-15 PROCEDURE — 2500000003 HC RX 250 WO HCPCS: Performed by: UROLOGY

## 2025-05-15 PROCEDURE — 6370000000 HC RX 637 (ALT 250 FOR IP): Performed by: UROLOGY

## 2025-05-15 PROCEDURE — 3700000000 HC ANESTHESIA ATTENDED CARE: Performed by: UROLOGY

## 2025-05-15 PROCEDURE — 6360000002 HC RX W HCPCS: Performed by: ANESTHESIOLOGY

## 2025-05-15 PROCEDURE — 3600000002 HC SURGERY LEVEL 2 BASE: Performed by: UROLOGY

## 2025-05-15 PROCEDURE — 2580000003 HC RX 258: Performed by: UROLOGY

## 2025-05-15 PROCEDURE — 7100000000 HC PACU RECOVERY - FIRST 15 MIN: Performed by: UROLOGY

## 2025-05-15 PROCEDURE — 2709999900 HC NON-CHARGEABLE SUPPLY: Performed by: UROLOGY

## 2025-05-15 PROCEDURE — 3700000001 HC ADD 15 MINUTES (ANESTHESIA): Performed by: UROLOGY

## 2025-05-15 PROCEDURE — 7100000011 HC PHASE II RECOVERY - ADDTL 15 MIN: Performed by: UROLOGY

## 2025-05-15 PROCEDURE — 7100000001 HC PACU RECOVERY - ADDTL 15 MIN: Performed by: UROLOGY

## 2025-05-15 PROCEDURE — 6360000002 HC RX W HCPCS: Performed by: NURSE ANESTHETIST, CERTIFIED REGISTERED

## 2025-05-15 RX ORDER — CEPHALEXIN 500 MG/1
500 CAPSULE ORAL 2 TIMES DAILY
Qty: 20 CAPSULE | Refills: 0 | Status: SHIPPED | OUTPATIENT
Start: 2025-05-15 | End: 2025-05-25

## 2025-05-15 RX ORDER — SODIUM CHLORIDE 0.9 % (FLUSH) 0.9 %
5-40 SYRINGE (ML) INJECTION PRN
Status: DISCONTINUED | OUTPATIENT
Start: 2025-05-15 | End: 2025-05-15 | Stop reason: HOSPADM

## 2025-05-15 RX ORDER — NALOXONE HYDROCHLORIDE 0.4 MG/ML
INJECTION, SOLUTION INTRAMUSCULAR; INTRAVENOUS; SUBCUTANEOUS PRN
Status: DISCONTINUED | OUTPATIENT
Start: 2025-05-15 | End: 2025-05-15 | Stop reason: HOSPADM

## 2025-05-15 RX ORDER — PROPOFOL 10 MG/ML
INJECTION, EMULSION INTRAVENOUS
Status: DISCONTINUED | OUTPATIENT
Start: 2025-05-15 | End: 2025-05-15 | Stop reason: SDUPTHER

## 2025-05-15 RX ORDER — SODIUM CHLORIDE 9 MG/ML
INJECTION, SOLUTION INTRAVENOUS PRN
Status: DISCONTINUED | OUTPATIENT
Start: 2025-05-15 | End: 2025-05-15 | Stop reason: HOSPADM

## 2025-05-15 RX ORDER — LIDOCAINE HYDROCHLORIDE 20 MG/ML
INJECTION, SOLUTION INTRAVENOUS
Status: DISCONTINUED | OUTPATIENT
Start: 2025-05-15 | End: 2025-05-15 | Stop reason: SDUPTHER

## 2025-05-15 RX ORDER — TRAMADOL HYDROCHLORIDE 50 MG/1
50 TABLET ORAL ONCE
Status: COMPLETED | OUTPATIENT
Start: 2025-05-15 | End: 2025-05-15

## 2025-05-15 RX ORDER — HYDRALAZINE HYDROCHLORIDE 20 MG/ML
10 INJECTION INTRAMUSCULAR; INTRAVENOUS
Status: DISCONTINUED | OUTPATIENT
Start: 2025-05-15 | End: 2025-05-15 | Stop reason: HOSPADM

## 2025-05-15 RX ORDER — LABETALOL HYDROCHLORIDE 5 MG/ML
10 INJECTION, SOLUTION INTRAVENOUS
Status: DISCONTINUED | OUTPATIENT
Start: 2025-05-15 | End: 2025-05-15 | Stop reason: HOSPADM

## 2025-05-15 RX ORDER — BUPIVACAINE HYDROCHLORIDE 5 MG/ML
INJECTION, SOLUTION PERINEURAL PRN
Status: DISCONTINUED | OUTPATIENT
Start: 2025-05-15 | End: 2025-05-15 | Stop reason: ALTCHOICE

## 2025-05-15 RX ORDER — DEXAMETHASONE SODIUM PHOSPHATE 4 MG/ML
INJECTION, SOLUTION INTRA-ARTICULAR; INTRALESIONAL; INTRAMUSCULAR; INTRAVENOUS; SOFT TISSUE
Status: DISCONTINUED | OUTPATIENT
Start: 2025-05-15 | End: 2025-05-15 | Stop reason: SDUPTHER

## 2025-05-15 RX ORDER — SODIUM CHLORIDE 0.9 % (FLUSH) 0.9 %
5-40 SYRINGE (ML) INJECTION EVERY 12 HOURS SCHEDULED
Status: DISCONTINUED | OUTPATIENT
Start: 2025-05-15 | End: 2025-05-15 | Stop reason: HOSPADM

## 2025-05-15 RX ORDER — ONDANSETRON 2 MG/ML
INJECTION INTRAMUSCULAR; INTRAVENOUS
Status: DISCONTINUED | OUTPATIENT
Start: 2025-05-15 | End: 2025-05-15 | Stop reason: SDUPTHER

## 2025-05-15 RX ORDER — FENTANYL CITRATE 50 UG/ML
INJECTION, SOLUTION INTRAMUSCULAR; INTRAVENOUS
Status: DISCONTINUED | OUTPATIENT
Start: 2025-05-15 | End: 2025-05-15 | Stop reason: SDUPTHER

## 2025-05-15 RX ORDER — ONDANSETRON 2 MG/ML
4 INJECTION INTRAMUSCULAR; INTRAVENOUS
Status: DISCONTINUED | OUTPATIENT
Start: 2025-05-15 | End: 2025-05-15 | Stop reason: HOSPADM

## 2025-05-15 RX ORDER — MIDAZOLAM HYDROCHLORIDE 1 MG/ML
INJECTION, SOLUTION INTRAMUSCULAR; INTRAVENOUS
Status: DISCONTINUED | OUTPATIENT
Start: 2025-05-15 | End: 2025-05-15 | Stop reason: SDUPTHER

## 2025-05-15 RX ADMIN — HYDROMORPHONE HYDROCHLORIDE 0.5 MG: 1 INJECTION, SOLUTION INTRAMUSCULAR; INTRAVENOUS; SUBCUTANEOUS at 09:54

## 2025-05-15 RX ADMIN — DEXAMETHASONE SODIUM PHOSPHATE 10 MG: 4 INJECTION, SOLUTION INTRAMUSCULAR; INTRAVENOUS at 08:38

## 2025-05-15 RX ADMIN — SODIUM CHLORIDE: 0.9 INJECTION, SOLUTION INTRAVENOUS at 07:32

## 2025-05-15 RX ADMIN — FENTANYL CITRATE 50 MCG: 50 INJECTION, SOLUTION INTRAMUSCULAR; INTRAVENOUS at 08:51

## 2025-05-15 RX ADMIN — PROPOFOL 200 MG: 10 INJECTION, EMULSION INTRAVENOUS at 08:32

## 2025-05-15 RX ADMIN — ONDANSETRON 2 MG: 2 INJECTION, SOLUTION INTRAMUSCULAR; INTRAVENOUS at 08:37

## 2025-05-15 RX ADMIN — MIDAZOLAM 2 MG: 1 INJECTION INTRAMUSCULAR; INTRAVENOUS at 08:27

## 2025-05-15 RX ADMIN — TRAMADOL HYDROCHLORIDE 50 MG: 50 TABLET, COATED ORAL at 11:05

## 2025-05-15 RX ADMIN — FENTANYL CITRATE 50 MCG: 50 INJECTION, SOLUTION INTRAMUSCULAR; INTRAVENOUS at 08:37

## 2025-05-15 RX ADMIN — WATER 2000 MG: 1 INJECTION INTRAMUSCULAR; INTRAVENOUS; SUBCUTANEOUS at 08:27

## 2025-05-15 RX ADMIN — Medication 100 MG: at 08:32

## 2025-05-15 RX ADMIN — HYDROMORPHONE HYDROCHLORIDE 0.5 MG: 1 INJECTION, SOLUTION INTRAMUSCULAR; INTRAVENOUS; SUBCUTANEOUS at 09:59

## 2025-05-15 ASSESSMENT — PAIN SCALES - GENERAL
PAINLEVEL_OUTOF10: 4
PAINLEVEL_OUTOF10: 7
PAINLEVEL_OUTOF10: 5
PAINLEVEL_OUTOF10: 5
PAINLEVEL_OUTOF10: 0
PAINLEVEL_OUTOF10: 7
PAINLEVEL_OUTOF10: 2
PAINLEVEL_OUTOF10: 5

## 2025-05-15 ASSESSMENT — PAIN DESCRIPTION - DESCRIPTORS
DESCRIPTORS: ACHING
DESCRIPTORS: BURNING
DESCRIPTORS: ACHING
DESCRIPTORS: ACHING;DISCOMFORT

## 2025-05-15 ASSESSMENT — PAIN DESCRIPTION - ONSET
ONSET: ON-GOING
ONSET: ON-GOING

## 2025-05-15 ASSESSMENT — PAIN - FUNCTIONAL ASSESSMENT
PAIN_FUNCTIONAL_ASSESSMENT: ACTIVITIES ARE NOT PREVENTED
PAIN_FUNCTIONAL_ASSESSMENT: NONE - DENIES PAIN

## 2025-05-15 ASSESSMENT — PAIN DESCRIPTION - PAIN TYPE
TYPE: SURGICAL PAIN

## 2025-05-15 ASSESSMENT — PAIN DESCRIPTION - LOCATION
LOCATION: PENIS
LOCATION: GROIN

## 2025-05-15 ASSESSMENT — PAIN DESCRIPTION - FREQUENCY
FREQUENCY: CONTINUOUS
FREQUENCY: CONTINUOUS

## 2025-05-15 ASSESSMENT — PAIN DESCRIPTION - ORIENTATION: ORIENTATION: RIGHT;LEFT

## 2025-05-15 NOTE — PROGRESS NOTES
Patient admitted to [facility] room 11 with wife at bedside. Bed in low position side rails up call light in reach. Patient denies questions at this time.     Harika [phone]

## 2025-05-15 NOTE — DISCHARGE INSTRUCTIONS
Remove drain next week with samuel (7 days from now)  Samuel again in 4 weeks    take Tramadol for pain, Keflex is an antibiotic, take as prescribed    Pt ok to discharge home in good condition  No heavy lifting, >10 lbs for today  Pt should avoid strenuous activity for today  Pt should walk moderately at home  Pt ok to shower   Pt may resume diet as tolerated  Pt should take Rx as directed  No driving while on narcotics  Please call attending physician or hospital  with questions  Call or Present to ED if fever (> 101F), intractable nausea vomiting or pain.  Rx in chart     Pt should follow up with Dr Orozco, call to confirm appointment

## 2025-05-15 NOTE — ANESTHESIA PRE PROCEDURE
Department of Anesthesiology  Preprocedure Note       Name:  Richi Proctor   Age:  64 y.o.  :  1960                                          MRN:  [id number]         Date:  5/15/2025      Surgeon: Surgeon(s):  Luis Orozco MD    Procedure: Procedure(s):  LEFT HYDROCELECTOMY WITH DRAIN PLACEMENT    Medications prior to admission:   Prior to Admission medications    Medication Sig Start Date End Date Taking? Authorizing Provider   traMADol (ULTRAM) 50 MG tablet Take 1 tablet by mouth every 6 hours as needed for Pain for up to 150 days. Take lowest dose possible to manage pain Max Daily Amount: 200 mg 3/28/25 8/25/25 Yes Eloy Lopez MD   predniSONE (DELTASONE) 10 MG tablet 3 tabs per day for 3 days, 2 tabs per day for 3 days, 1 tab per day for 3 days 3/17/25  Yes Eloy Lopez MD   albuterol sulfate HFA (VENTOLIN HFA) 108 (90 Base) MCG/ACT inhaler Inhale 2 puffs into the lungs every 6 hours as needed for Wheezing   Yes Brad Yoo MD   amLODIPine (NORVASC) 5 MG tablet Take 1 tablet by mouth daily  Patient taking differently: Take 1 tablet by mouth every evening 24  Yes Eloy Lopez MD   celecoxib (CELEBREX) 100 MG capsule Take 1 capsule by mouth 2 times daily 24  Yes Eloy Lopez MD   lisinopril (PRINIVIL;ZESTRIL) 20 MG tablet Take 1 tablet by mouth daily 24  Yes Eloy Lopez MD   predniSONE (DELTASONE) 5 MG tablet Take 1 tablet by mouth daily 24  Yes Eloy Lopez MD   traZODone (DESYREL) 50 MG tablet Take 1 tablet by mouth nightly 1 tablet by mouth nightly 24 Yes Eloy Lopez MD   cloNIDine (CATAPRES) 0.1 MG tablet Take 1 tablet by mouth 2 times daily 24  Yes Eloy Lopez MD       Current medications:    Current Facility-Administered Medications   Medication Dose Route Frequency Provider Last Rate Last Admin    sodium chloride flush 0.9 % injection 5-40 mL  5-40 mL IntraVENous 2 times per day Luis Orozco MD        sodium chloride flush

## 2025-05-15 NOTE — PROGRESS NOTES
Pt returned to [facility] room 11. Vitals and assessment as charted. 0.9 infusing,  to count from PACU. Pt has crackers and water. Family at the bedside. Pt and family verbalized understanding of discharge criteria and call light use. Call light in reach.

## 2025-05-15 NOTE — BRIEF OP NOTE
Brief Postoperative Note      Patient: Richi Proctor  YOB: 1960  MRN: [id number]    Date of Procedure: 5/15/2025    Pre-Op Diagnosis Codes:      * Testicular pain, left [N50.812]     * Hydrocele, left [N43.3]     * Left inguinal pain [R10.32]    Post-Op Diagnosis: Same       Procedure(s):  LEFT HYDROCELECTOMY WITH DRAIN PLACEMENT    Surgeon(s):  Luis Orozco MD    Assistant:  * No surgical staff found *    Anesthesia: General    Estimated Blood Loss (mL): Minimal    Complications: None    Specimens:   * No specimens in log *    Implants:  * No implants in log *      Drains:   Open Drain 05/15/25 Left Groin (Active)       Findings:  Drain placed. Recurrence of hydrocele. Remove drain next week with samuel (7 days from now)  Samuel again in 4 weeks   Pain meds abx for ten days    Electronically signed by LUIS OROZCO MD on 5/15/2025 at 9:34 AM

## 2025-05-15 NOTE — PROGRESS NOTES
0936: Pt arrives to pacu, awakens to verbal stimuli. Pt on room air, respirations easy and unlabored. Denies pain currently. VSS    0945: Pt resting off and on with eyes closed, easily awakens to voice. Continues to deny pain or nausea.     0954: Pt more awake, c/o pain 7/10, 0.5mg of dilaudid administered    0958: Pt denies nausea, ice chips provided. Tolerated well    0959: No change in pain, 0.5mg of dilaudid administered    1005: Pt resting off and on with eyes closed, easily awakens to voice. Noted some improvement in pain. States pain tolerable    1019: Pt meets criteria for discharge from pacu, transported back to [facility] in stable condition. VSS

## 2025-05-15 NOTE — PROGRESS NOTES
Pt has met discharge criteria and states he is ready for discharge to home. IV removed, gauze and tape applied. Dressed in own clothes and personal belongings gathered. Discharge instructions (with opioid medication education information) given to pt and family; pt and family verbalized understanding of discharge instructions, prescriptions and follow up appointments. Pt transported to discharge lobby by [facility] staff.  '

## 2025-05-15 NOTE — ANESTHESIA POSTPROCEDURE EVALUATION
Department of Anesthesiology  Postprocedure Note    Patient: Richi Proctor  MRN: [id number]  YOB: 1960  Date of evaluation: 5/15/2025    Procedure Summary       Date: 05/15/25 [facility]    Anesthesia Start: 0827 Anesthesia Stop: 0938    Procedure: LEFT HYDROCELECTOMY WITH DRAIN PLACEMENT (Left) Diagnosis:       Testicular pain, left      Hydrocele, left      Left inguinal pain      (Testicular pain, left [N50.812])      (Hydrocele, left [N43.3])      (Left inguinal pain [R10.32])    Surgeons: Luis Orozco MD Responsible Provider: Maverick Kim MD    Anesthesia Type: general ASA Status: 2            Anesthesia Type: No value filed.    Juvenal Phase I: Juvenal Score: 9    Juvenal Phase II: Juvenal Score: 10    Anesthesia Post Evaluation    Patient location during evaluation: PACU  Patient participation: complete - patient participated  Level of consciousness: awake and alert  Airway patency: patent  Nausea & Vomiting: no nausea  Cardiovascular status: blood pressure returned to baseline and hemodynamically stable  Respiratory status: acceptable and spontaneous ventilation  Hydration status: euvolemic  Pain management: adequate    No notable events documented.

## 2025-05-15 NOTE — PROGRESS NOTES
Patient discharge orders placed per recommendations of Dr. Luis Orozco as written in the brief operative report.     PDMP reviewed  Allergies, renal function, and urine studies reviewed.         Discharge with Keflex, cont tramadol for pain    Follow-up in  Remove drain next week with samuel (7 days from now)  Samuel again in 4 weeks

## 2025-05-15 NOTE — H&P
MAYELIN CLAY MD  History and Physical    Patient:  Richi Proctor  MRN: [id number]  YOB: 1960    HISTORY OF PRESENT ILLNESS:     The patient is a 64 y.o. male who presents with hydrocele. Here for procedure.    Patient's old records, notes and chart reviewed and summarized above.     MAYELIN CLAY MD independently reviewed the images and verified the radiology reports from:    No results found.      Past Medical History:    Past Medical History:   Diagnosis Date    Asthma     Cancer (HCC)     prostate cancer, skin cancer on right hand - removed    Difficult intubation     in 8/25/2020 difficult to intubate when he had cervical sx; also in 2023 difficulty - PLEASE USE SMALLER EQUIPMENT    Hypertension     Rheumatoid arthritis (HCC)     Stomach ulcer        Past Surgical History:    Past Surgical History:   Procedure Laterality Date    BACK SURGERY  08/2020    [place]     CARPAL TUNNEL RELEASE Left 03/05/2020    CARPAL TUNNEL RELEASE Right 04/30/2025    CERVICAL DISC SURGERY      fusion in 2005    HYDROCELE EXCISION Left 01/22/2025    LEFT HYDROCELECTOMY performed by Jason Pruitt MD at [facility] OR    NECK SURGERY  2006    fusion C3 C4 C5    ROTATOR CUFF REPAIR Right 12/21/2023    and bicep repair    TURP N/A 12/23/2020    CYSTOSCOPY TRANSURETHRAL RESECTION PROSTATE performed by Jason Pruitt MD at [facility] OR    ULTRASOUND PROSTATE/TRANSRECTAL N/A 09/23/2020    CYSTOSCOPY AND TRANSRECTAL ULTRASOUND GUIDED PROSTATE BIOPSY performed by Jason Pruitt MD at [facility] OR       Medications Prior to Admission:    Prior to Admission medications    Medication Sig Start Date End Date Taking? Authorizing Provider   traMADol (ULTRAM) 50 MG tablet Take 1 tablet by mouth every 6 hours as needed for Pain for up to 150 days. Take lowest dose possible to manage pain Max Daily Amount: 200 mg 3/28/25 8/25/25 Yes Eloy Lopez MD   predniSONE (DELTASONE) 10 MG tablet 3 tabs per day for 3 days, 2 tabs per day for 3 days, 1

## 2025-05-20 ENCOUNTER — OFFICE VISIT (OUTPATIENT)
Dept: UROLOGY | Age: 65
End: 2025-05-20

## 2025-05-20 VITALS — BODY MASS INDEX: 27 KG/M2 | WEIGHT: 172 LBS | RESPIRATION RATE: 16 BRPM | HEIGHT: 67 IN

## 2025-05-20 DIAGNOSIS — N13.8 BPH WITH OBSTRUCTION/LOWER URINARY TRACT SYMPTOMS: ICD-10-CM

## 2025-05-20 DIAGNOSIS — N43.3 HYDROCELE, LEFT: Primary | ICD-10-CM

## 2025-05-20 DIAGNOSIS — N40.1 BPH WITH OBSTRUCTION/LOWER URINARY TRACT SYMPTOMS: ICD-10-CM

## 2025-05-20 DIAGNOSIS — C61 PROSTATE CANCER (HCC): ICD-10-CM

## 2025-05-20 DIAGNOSIS — N50.812 PAIN IN LEFT TESTICLE: ICD-10-CM

## 2025-05-20 PROCEDURE — 99024 POSTOP FOLLOW-UP VISIT: CPT

## 2025-05-20 NOTE — PROGRESS NOTES
traZODone (DESYREL) 50 MG tablet Take 1 tablet by mouth nightly 1 tablet by mouth nightly 30 tablet 11    cloNIDine (CATAPRES) 0.1 MG tablet Take 1 tablet by mouth 2 times daily 60 tablet 5       Codeine  Social History     Tobacco Use   Smoking Status Former    Current packs/day: 0.00    Average packs/day: 0.3 packs/day for 18.1 years (4.5 ttl pk-yrs)    Types: Cigarettes    Start date: 1990    Quit date: 2008    Years since quittin.2   Smokeless Tobacco Never       Social History     Substance and Sexual Activity   Alcohol Use Never       REVIEW OF SYSTEMS:  Constitutional: negative  Eyes: negative  Respiratory: negative  Cardiovascular: negative  Gastrointestinal: negative  Musculoskeletal: negative  Genitourinary: negative except for what is in HPI  Skin: negative   Neurological: negative  Hematological/Lymphatic: negative  Psychological: negative    Physical Exam:      Vitals:    25 0901   Resp: 16       Patient is a 64 y.o. male in no acute distress and alert and oriented to person, place and time.    Pulmonary: Non-labored respiration.  Cardiovascular: Normal rate, regular rhythm, normal peripheral pulses.  Skin: Warm and dry.  Psych: Normal mood and affect.  Genitourinary: Large left-sided hydrocele tender to palpation. Right hemiscrotal contents normal to evaluation. Bladder non-distended and non-tender.      Assessment and Plan   Left Hydrocele  Left Inguinal Pain  - Large, approximately the size of racquet ball pre-op per patient account.  - Now S/P Left Hydrocelectomy w/ Drain Placement by Dr. Orozco on 05/15/25. Doing well post-operatively. Pain is minimal. Swelling and ecchymosis are mild.  - Drainage resolved. I personally removed patient's drain in the office today which he tolerated well.  - Lifting and activity restrictions discussed. Return to work date of 25.    Prostate CA (HCC)  - Active Surveillance. Marilu 3+3=6, low volume. Detected in  on TRUS Bx and

## 2025-05-20 NOTE — OP NOTE
Luis Orozco MD.  Urologic Surgery      [facility]    DATE: 5/15/2025  Patient:  Richi Proctor  MRN: [id number]  YOB: 1960    SURGEON: Luis Orozco MD.    ASSISTANT: none    PREOPERATIVE DIAGNOSIS: left hydrocele    POSTOPERATIVE DIAGNOSIS:  left hydrocele    PROCEDURE PERFORMED: left hydrocelectomy with ilioinguinal and iliohypogastric nerve block for postoperative pain control.    ANESTHESIA: General    COMPLICATIONS: none    OR BLOOD LOSS:  Minimal    FLUIDS: Cystalloids per Anesthesia    SPECIMENS:  * No specimens in log *      DRAINS: penrose drain    INDICATIONS FOR PROCEDURE:  The patient is a 64 y.o. male who presents today with Testicular pain, left [N50.812]  Hydrocele, left [N43.3]  Left inguinal pain [R10.32] here for LEFT HYDROCELECTOMY WITH DRAIN PLACEMENT. After risks, benefits and alternatives of the procedure were discussed with the patient, the patient elected to proceed.     DETAILS OF PROCEDURE:  After informed consent was obtained in the preoperative area, the patient was taken back to the operating room and transferred to the operating table in supine position.  Anesthesia was induced and antibiotics were given.  A timeout occurred.  Two patient identifiers were used.     A transverse scrotal incision was made over the left hemiscrotum.  The dartos was divided and the tunica vaginalis was cleaned off. The testis was delivered. The vaginalis was then opened and the hydrocele was evacuated.  The testis was inspected.    Excess tunica vaginalis was excised. The tunica vaginalis was then everted with  3-0 Vicryl. Exquisite hemostasis was achieved globally. The lateral sulcus was located and the testicle was placed back in the scrotum in proper position. The dartos layer was closed with  3-0 Vicryl suture.       Afterwards, the skin was closed with  3-0 Vicryl and 3-0 Chromic.  Bacitracin ointment was placed on the wound..  a penrose drain was placed. ilioinguinal and

## 2025-06-13 ENCOUNTER — TELEPHONE (OUTPATIENT)
Dept: UROLOGY | Age: 65
End: 2025-06-13

## 2025-06-13 NOTE — TELEPHONE ENCOUNTER
Ice/elevation/NSAID's/tylenol/scrotal supportive underwear.     I can offer U/S scrotum to assess/rule out for abscess since he reports grape size area that is tender (per his report to RN on phone this area was present since surgery)    Needs follow up Monday since office is closing soon today already    If fevers, chills, uncontrolled pain go to ER. Or if otherwise concerned.

## 2025-06-13 NOTE — TELEPHONE ENCOUNTER
Patient states he still has quite of pain from the surgery. The surgical site is not healing and clothing will cause a raw spot. He still has some drainage that is red that is scant amount.    He is suppose to return to work Monday 06/23/2025. He does not think he will be able to go back work.    The surgical site feels like bee stings and he has a hard area approximately grape size.    He denies any infection and scheduled an appointment on Monday to be evaluated

## 2025-06-16 ENCOUNTER — OFFICE VISIT (OUTPATIENT)
Dept: UROLOGY | Age: 65
End: 2025-06-16
Payer: COMMERCIAL

## 2025-06-16 VITALS — HEIGHT: 67 IN | WEIGHT: 172 LBS | RESPIRATION RATE: 18 BRPM | BODY MASS INDEX: 27 KG/M2

## 2025-06-16 DIAGNOSIS — N43.3 HYDROCELE, LEFT: Primary | ICD-10-CM

## 2025-06-16 DIAGNOSIS — C61 PROSTATE CANCER (HCC): ICD-10-CM

## 2025-06-16 DIAGNOSIS — K40.90 LEFT INGUINAL HERNIA: ICD-10-CM

## 2025-06-16 DIAGNOSIS — N40.1 BPH WITH OBSTRUCTION/LOWER URINARY TRACT SYMPTOMS: ICD-10-CM

## 2025-06-16 DIAGNOSIS — N13.8 BPH WITH OBSTRUCTION/LOWER URINARY TRACT SYMPTOMS: ICD-10-CM

## 2025-06-16 PROCEDURE — 99213 OFFICE O/P EST LOW 20 MIN: CPT

## 2025-06-16 NOTE — PROGRESS NOTES
[facility] [place] [facility]  [facility] UROLOGY  [address]  [address]  [facility] [address]  Dept: [phone]  Loc: [phone]  Visit Date: 6/16/2025      HPI  Richi Proctor is a 64 y.o. male that presents to the urology clinic for left hydrocelectomy follow-up.    Left Hydrocele & Left Inguinal Pain  Present for > 2 years. Cosmetically bothersome and causing left inguinal pain.     History of Left Hydrocelectomy by Dr. Pruitt on 01/22/25... patient unfortunately did have recurrence of his left hydrocele requiring a 2nd procedure, now S/P Left Hydrocelectomy w/ Drain Placement by Dr. Orozco on 05/15/25. Initially patient did well post-operatively, however, now endorsing increased left inguinal pain and incisional pain.    Prostate CA (HCC)  Active Surveillance. [place] 3+3=6, low volume. Detected in 2020 on TRUS Bx and re-demonstrated in April 2022. PSA of 6.19 at time of initial detection.    BPH w/ LUTS  S/P TURP in December of 2020 by Dr. Pruitt. Doing well post-operatively. Stream remains strong. Denies hesitancy, straining, feelings of incomplete emptying.      PSA Trend  5.14   (11/13/24)  4.50   (05/13/24)  5.69   (10/04/23)  5.07   (09/27/22)  4.29   (06/14/21)  4.31   (06/07/21)      Last BUN and Creatinine:  Lab Results   Component Value Date    BUN 20 05/09/2025     Lab Results   Component Value Date    CREATININE 1.2 05/09/2025           PAST MEDICAL, FAMILY AND SOCIAL HISTORY UPDATE:  Past Medical History:   Diagnosis Date    Asthma     Cancer (HCC)     prostate cancer, skin cancer on right hand - removed    Difficult intubation     in 8/25/2020 difficult to intubate when he had cervical sx; also in 2023 difficulty - PLEASE USE SMALLER EQUIPMENT    Hypertension     Rheumatoid arthritis (HCC)     Stomach ulcer      Past Surgical History:   Procedure Laterality Date    BACK SURGERY  08/2020    [place]     CARPAL TUNNEL RELEASE Left 03/05/2020    CARPAL TUNNEL RELEASE Right

## 2025-06-19 DIAGNOSIS — K40.90 LEFT INGUINAL HERNIA: Primary | ICD-10-CM

## 2025-06-27 ENCOUNTER — LAB (OUTPATIENT)
Dept: LAB | Age: 65
End: 2025-06-27

## 2025-06-27 DIAGNOSIS — C61 PROSTATE CANCER (HCC): ICD-10-CM

## 2025-06-27 LAB — PSA SERPL-MCNC: 3.73 NG/ML (ref 0–1)

## 2025-07-02 ENCOUNTER — OFFICE VISIT (OUTPATIENT)
Dept: UROLOGY | Age: 65
End: 2025-07-02

## 2025-07-02 VITALS
DIASTOLIC BLOOD PRESSURE: 80 MMHG | WEIGHT: 172.5 LBS | HEIGHT: 67 IN | BODY MASS INDEX: 27.07 KG/M2 | SYSTOLIC BLOOD PRESSURE: 132 MMHG

## 2025-07-02 DIAGNOSIS — N43.3 HYDROCELE, LEFT: Primary | ICD-10-CM

## 2025-07-02 DIAGNOSIS — C61 PROSTATE CANCER (HCC): ICD-10-CM

## 2025-07-02 PROCEDURE — 99024 POSTOP FOLLOW-UP VISIT: CPT | Performed by: UROLOGY

## 2025-07-02 NOTE — PROGRESS NOTES
-- Reviewed goals of therapy are to get the best control we can without hypoglycemia  Check labs on RTC    A1c above goal due to late/decreased doses of Mounjaro. Would prefer Mounjaro 15 mg weekly     Continue   Synjardy 12.5/1000 mg twice daily  Mounjaro 12.5/15 mg weekly   Lyumjev/Humalog 10 units before breakfast and dinner (meals === fast acting)  Continue Starlix 120 mg before lunch -don't miss doses and set alarm to take     Discussed desirae or dexcom and he declines.    SATNAM slightly +     -- Reviewed patient's current insulin regimen. Clarified proper insulin dose and timing in relation to meals, etc. Insulin injection sites and proper rotation instructed.    -- Advised frequent self blood glucose monitoring.  Patient encouraged to document glucose results and bring them to every clinic visit    -- Hypoglycemia precautions discussed. Instructed on precautions before driving.    -- Call for Bg repeatedly < 90 or > 180.   -- Close adherence to lifestyle changes recommended.   -- Periodic follow ups for eye evaluations, foot care and dental care suggested.   Still having some seeping from his wound  Antibiotics did not help.   Discussed sitz baths +/- antibotics.  Will do sitz baths and consider repair of scar at the time of left hernia repair.

## 2025-07-23 ENCOUNTER — OFFICE VISIT (OUTPATIENT)
Dept: SURGERY | Age: 65
End: 2025-07-23
Payer: COMMERCIAL

## 2025-07-23 VITALS
RESPIRATION RATE: 18 BRPM | BODY MASS INDEX: 27.62 KG/M2 | SYSTOLIC BLOOD PRESSURE: 124 MMHG | WEIGHT: 176 LBS | TEMPERATURE: 97.7 F | DIASTOLIC BLOOD PRESSURE: 80 MMHG | OXYGEN SATURATION: 95 % | HEIGHT: 67 IN | HEART RATE: 65 BPM

## 2025-07-23 DIAGNOSIS — I10 ESSENTIAL HYPERTENSION: ICD-10-CM

## 2025-07-23 DIAGNOSIS — R10.32 LEFT INGUINAL PAIN: Primary | ICD-10-CM

## 2025-07-23 DIAGNOSIS — L82.1 SEBORRHEIC KERATOSIS: ICD-10-CM

## 2025-07-23 DIAGNOSIS — Z01.818 PRE-OP TESTING: ICD-10-CM

## 2025-07-23 PROBLEM — K40.90 INGUINAL HERNIA WITHOUT OBSTRUCTION OR GANGRENE: Status: ACTIVE | Noted: 2025-07-23

## 2025-07-23 PROBLEM — L98.9 SKIN LESION: Status: ACTIVE | Noted: 2025-07-23

## 2025-07-23 PROCEDURE — 99204 OFFICE O/P NEW MOD 45 MIN: CPT | Performed by: SURGERY

## 2025-07-23 PROCEDURE — 3079F DIAST BP 80-89 MM HG: CPT | Performed by: SURGERY

## 2025-07-23 PROCEDURE — 3074F SYST BP LT 130 MM HG: CPT | Performed by: SURGERY

## 2025-07-24 ASSESSMENT — ENCOUNTER SYMPTOMS
CHOKING: 0
DIARRHEA: 0
ABDOMINAL PAIN: 0
COLOR CHANGE: 0
ALLERGIC/IMMUNOLOGIC NEGATIVE: 1
NAUSEA: 0
EYE ITCHING: 0
COUGH: 0
STRIDOR: 0
BACK PAIN: 0
BLOOD IN STOOL: 0
EYE REDNESS: 0
RHINORRHEA: 0
SHORTNESS OF BREATH: 0
VOMITING: 0
ABDOMINAL DISTENTION: 0
ANAL BLEEDING: 0
PHOTOPHOBIA: 0
EYE DISCHARGE: 0
FACIAL SWELLING: 0
SINUS PRESSURE: 0
CHEST TIGHTNESS: 0
CONSTIPATION: 0
SORE THROAT: 0
TROUBLE SWALLOWING: 0
RECTAL PAIN: 0
WHEEZING: 0
EYE PAIN: 0
VOICE CHANGE: 0
APNEA: 0

## 2025-07-24 NOTE — PROGRESS NOTES
Richi Proctor (:  1960)     ASSESSMENT:  1.  Left inguinal hernia  2.  Probable right inguinal hernia  3.  Left groin skin lesion (seborrheic keratosis)    PLAN:  1. Schedule Richi for robotic left inguinal hernia repair with mesh; right inguinal hernia repair with mesh if identified intraoperatively; excision left groin skin lesion.  2. He will undergo pre-operative clearance per anesthesia guidelines with risk factors listed under the past medical history diagnosis & problem list.  3. The risks, benefits and alternatives were discussed with Richi including non-operative management.  Robotic, laparoscopic and open techniques discussed.  Pros and cons of mesh insertion discussed.  All questions answered. He understands and wishes to proceed with surgical intervention.  4. Restrictions discussed with Richi and he expresses understanding.  5. He is advised to call back directly if there are further questions/concerns, or if his symptoms worsen prior to surgery.    SUBJECTIVE/OBJECTIVE:    Chief Complaint   Patient presents with    Surgical Consult     NP ref by Moose Wright PA-C - Left inguinal hernia     HPI  Richi is a 65-year-old male who presents for initial evaluation secondary to a left inguinal hernia.  He has recently noticed a bulge in this area.  Has had discomfort in this area that extended down into the scrotum for more than 2 years.  Had a left hydrocele repair 2025.  He then had this done again in May of this year.  Still having issues with groin discomfort.  Found to have a left inguinal hernia by urology.  He thinks he may have a small bulge on the right side as well.  No new urinary complaints.  Admits to a cystoscopy with TURP back in .  No generalized abdominal pain.  Tolerating diet.  Normal bowel function.  No hematochezia or melena.  Denies chest pain or shortness of breath.  Denies any major abdominal surgeries.    Review of Systems   Constitutional:  Negative for activity

## 2025-08-11 ENCOUNTER — TELEPHONE (OUTPATIENT)
Dept: SURGERY | Age: 65
End: 2025-08-11

## 2025-08-15 ENCOUNTER — LAB (OUTPATIENT)
Dept: LAB | Age: 65
End: 2025-08-15

## 2025-08-15 DIAGNOSIS — Z01.818 PRE-OP TESTING: ICD-10-CM

## 2025-08-15 DIAGNOSIS — R10.32 LEFT INGUINAL PAIN: ICD-10-CM

## 2025-08-15 DIAGNOSIS — I10 ESSENTIAL HYPERTENSION: ICD-10-CM

## 2025-08-15 LAB
ANION GAP SERPL CALC-SCNC: 10 MEQ/L (ref 8–16)
BUN SERPL-MCNC: 21 MG/DL (ref 8–23)
CALCIUM SERPL-MCNC: 8.8 MG/DL (ref 8.5–10.5)
CHLORIDE SERPL-SCNC: 103 MEQ/L (ref 98–111)
CO2 SERPL-SCNC: 25 MEQ/L (ref 22–29)
CREAT SERPL-MCNC: 1 MG/DL (ref 0.7–1.2)
GFR SERPL CREATININE-BSD FRML MDRD: 84 ML/MIN/1.73M2
GLUCOSE SERPL-MCNC: 97 MG/DL (ref 74–109)
HCT VFR BLD AUTO: 42.8 % (ref 42–52)
HGB BLD-MCNC: 14.3 GM/DL (ref 14–18)
POTASSIUM SERPL-SCNC: 4.3 MEQ/L (ref 3.5–5.2)
SODIUM SERPL-SCNC: 138 MEQ/L (ref 135–145)

## 2025-08-21 ENCOUNTER — ANESTHESIA EVENT (OUTPATIENT)
Dept: OPERATING ROOM | Age: 65
End: 2025-08-21
Payer: COMMERCIAL

## 2025-08-21 ENCOUNTER — HOSPITAL ENCOUNTER (OUTPATIENT)
Age: 65
Setting detail: OUTPATIENT SURGERY
Discharge: HOME | End: 2025-08-21
Attending: SURGERY | Admitting: SURGERY
Payer: COMMERCIAL

## 2025-08-21 ENCOUNTER — ANESTHESIA (OUTPATIENT)
Dept: OPERATING ROOM | Age: 65
End: 2025-08-21
Payer: COMMERCIAL

## 2025-08-21 VITALS
BODY MASS INDEX: 26.81 KG/M2 | SYSTOLIC BLOOD PRESSURE: 147 MMHG | WEIGHT: 170.8 LBS | RESPIRATION RATE: 16 BRPM | OXYGEN SATURATION: 97 % | HEART RATE: 55 BPM | TEMPERATURE: 96.6 F | DIASTOLIC BLOOD PRESSURE: 79 MMHG | HEIGHT: 67 IN

## 2025-08-21 DIAGNOSIS — L98.9 SKIN LESION: ICD-10-CM

## 2025-08-21 DIAGNOSIS — Z98.890 S/P LEFT INGUINAL HERNIA REPAIR: Primary | ICD-10-CM

## 2025-08-21 DIAGNOSIS — M79.641 PAIN IN BOTH HANDS: ICD-10-CM

## 2025-08-21 DIAGNOSIS — M79.642 PAIN IN BOTH HANDS: ICD-10-CM

## 2025-08-21 DIAGNOSIS — Z87.19 S/P LEFT INGUINAL HERNIA REPAIR: Primary | ICD-10-CM

## 2025-08-21 DIAGNOSIS — K40.90 INGUINAL HERNIA WITHOUT OBSTRUCTION OR GANGRENE: ICD-10-CM

## 2025-08-21 LAB — POTASSIUM SERPL-SCNC: 4 MEQ/L (ref 3.5–5.2)

## 2025-08-21 PROCEDURE — 6370000000 HC RX 637 (ALT 250 FOR IP): Performed by: SURGERY

## 2025-08-21 PROCEDURE — 7100000000 HC PACU RECOVERY - FIRST 15 MIN: Performed by: SURGERY

## 2025-08-21 PROCEDURE — 6360000002 HC RX W HCPCS: Performed by: SURGERY

## 2025-08-21 PROCEDURE — S2900 ROBOTIC SURGICAL SYSTEM: HCPCS | Performed by: SURGERY

## 2025-08-21 PROCEDURE — 88305 TISSUE EXAM BY PATHOLOGIST: CPT

## 2025-08-21 PROCEDURE — 84132 ASSAY OF SERUM POTASSIUM: CPT

## 2025-08-21 PROCEDURE — 3700000000 HC ANESTHESIA ATTENDED CARE: Performed by: SURGERY

## 2025-08-21 PROCEDURE — 3600000019 HC SURGERY ROBOT ADDTL 15MIN: Performed by: SURGERY

## 2025-08-21 PROCEDURE — C1781 MESH (IMPLANTABLE): HCPCS | Performed by: SURGERY

## 2025-08-21 PROCEDURE — 2500000003 HC RX 250 WO HCPCS: Performed by: NURSE PRACTITIONER

## 2025-08-21 PROCEDURE — 3700000001 HC ADD 15 MINUTES (ANESTHESIA): Performed by: SURGERY

## 2025-08-21 PROCEDURE — 7100000011 HC PHASE II RECOVERY - ADDTL 15 MIN: Performed by: SURGERY

## 2025-08-21 PROCEDURE — 7100000001 HC PACU RECOVERY - ADDTL 15 MIN: Performed by: SURGERY

## 2025-08-21 PROCEDURE — 2580000003 HC RX 258: Performed by: NURSE PRACTITIONER

## 2025-08-21 PROCEDURE — 36415 COLL VENOUS BLD VENIPUNCTURE: CPT

## 2025-08-21 PROCEDURE — 11421 EXC H-F-NK-SP B9+MARG 0.6-1: CPT | Performed by: SURGERY

## 2025-08-21 PROCEDURE — 49650 LAP ING HERNIA REPAIR INIT: CPT | Performed by: SURGERY

## 2025-08-21 PROCEDURE — 7100000010 HC PHASE II RECOVERY - FIRST 15 MIN: Performed by: SURGERY

## 2025-08-21 PROCEDURE — 3600000009 HC SURGERY ROBOT BASE: Performed by: SURGERY

## 2025-08-21 PROCEDURE — 6360000002 HC RX W HCPCS: Performed by: NURSE PRACTITIONER

## 2025-08-21 PROCEDURE — 2709999900 HC NON-CHARGEABLE SUPPLY: Performed by: SURGERY

## 2025-08-21 PROCEDURE — 2500000003 HC RX 250 WO HCPCS: Performed by: NURSE ANESTHETIST, CERTIFIED REGISTERED

## 2025-08-21 PROCEDURE — 6360000002 HC RX W HCPCS: Performed by: NURSE ANESTHETIST, CERTIFIED REGISTERED

## 2025-08-21 DEVICE — MESH CS LEFT LARGE 10CM X 16CM: Type: IMPLANTABLE DEVICE | Site: ABDOMEN | Status: FUNCTIONAL

## 2025-08-21 RX ORDER — SODIUM CHLORIDE 9 MG/ML
INJECTION, SOLUTION INTRAVENOUS PRN
Status: CANCELLED | OUTPATIENT
Start: 2025-08-21

## 2025-08-21 RX ORDER — FENTANYL CITRATE 50 UG/ML
50 INJECTION, SOLUTION INTRAMUSCULAR; INTRAVENOUS EVERY 5 MIN PRN
Status: DISCONTINUED | OUTPATIENT
Start: 2025-08-21 | End: 2025-08-21 | Stop reason: HOSPADM

## 2025-08-21 RX ORDER — SODIUM CHLORIDE 0.9 % (FLUSH) 0.9 %
5-40 SYRINGE (ML) INJECTION PRN
Status: DISCONTINUED | OUTPATIENT
Start: 2025-08-21 | End: 2025-08-21 | Stop reason: HOSPADM

## 2025-08-21 RX ORDER — ONDANSETRON 2 MG/ML
4 INJECTION INTRAMUSCULAR; INTRAVENOUS
Status: DISCONTINUED | OUTPATIENT
Start: 2025-08-21 | End: 2025-08-21 | Stop reason: HOSPADM

## 2025-08-21 RX ORDER — SODIUM CHLORIDE 9 MG/ML
INJECTION, SOLUTION INTRAVENOUS PRN
Status: DISCONTINUED | OUTPATIENT
Start: 2025-08-21 | End: 2025-08-21 | Stop reason: HOSPADM

## 2025-08-21 RX ORDER — LIDOCAINE HYDROCHLORIDE 20 MG/ML
INJECTION, SOLUTION INTRAVENOUS
Status: DISCONTINUED | OUTPATIENT
Start: 2025-08-21 | End: 2025-08-21 | Stop reason: SDUPTHER

## 2025-08-21 RX ORDER — PHENYLEPHRINE HCL IN 0.9% NACL 1 MG/10 ML
SYRINGE (ML) INTRAVENOUS
Status: DISCONTINUED | OUTPATIENT
Start: 2025-08-21 | End: 2025-08-21 | Stop reason: SDUPTHER

## 2025-08-21 RX ORDER — ONDANSETRON 2 MG/ML
INJECTION INTRAMUSCULAR; INTRAVENOUS
Status: DISCONTINUED | OUTPATIENT
Start: 2025-08-21 | End: 2025-08-21 | Stop reason: SDUPTHER

## 2025-08-21 RX ORDER — ONDANSETRON 4 MG/1
4 TABLET, ORALLY DISINTEGRATING ORAL EVERY 8 HOURS PRN
Status: CANCELLED | OUTPATIENT
Start: 2025-08-21

## 2025-08-21 RX ORDER — ENOXAPARIN SODIUM 100 MG/ML
40 INJECTION SUBCUTANEOUS DAILY
Status: CANCELLED | OUTPATIENT
Start: 2025-08-21

## 2025-08-21 RX ORDER — OXYCODONE HYDROCHLORIDE 5 MG/1
5 TABLET ORAL
Status: DISCONTINUED | OUTPATIENT
Start: 2025-08-21 | End: 2025-08-21 | Stop reason: HOSPADM

## 2025-08-21 RX ORDER — SUCCINYLCHOLINE/SOD CL,ISO/PF 200MG/10ML
SYRINGE (ML) INTRAVENOUS
Status: DISCONTINUED | OUTPATIENT
Start: 2025-08-21 | End: 2025-08-21 | Stop reason: SDUPTHER

## 2025-08-21 RX ORDER — SODIUM CHLORIDE 0.9 % (FLUSH) 0.9 %
5-40 SYRINGE (ML) INJECTION PRN
Status: CANCELLED | OUTPATIENT
Start: 2025-08-21

## 2025-08-21 RX ORDER — SODIUM CHLORIDE 0.9 % (FLUSH) 0.9 %
5-40 SYRINGE (ML) INJECTION EVERY 12 HOURS SCHEDULED
Status: DISCONTINUED | OUTPATIENT
Start: 2025-08-21 | End: 2025-08-21 | Stop reason: HOSPADM

## 2025-08-21 RX ORDER — FENTANYL CITRATE 50 UG/ML
INJECTION, SOLUTION INTRAMUSCULAR; INTRAVENOUS
Status: DISCONTINUED | OUTPATIENT
Start: 2025-08-21 | End: 2025-08-21 | Stop reason: SDUPTHER

## 2025-08-21 RX ORDER — DEXAMETHASONE SODIUM PHOSPHATE 10 MG/ML
INJECTION, EMULSION INTRAMUSCULAR; INTRAVENOUS
Status: DISCONTINUED | OUTPATIENT
Start: 2025-08-21 | End: 2025-08-21 | Stop reason: SDUPTHER

## 2025-08-21 RX ORDER — SODIUM CHLORIDE 9 MG/ML
INJECTION, SOLUTION INTRAVENOUS CONTINUOUS
Status: DISCONTINUED | OUTPATIENT
Start: 2025-08-21 | End: 2025-08-21 | Stop reason: HOSPADM

## 2025-08-21 RX ORDER — BUPIVACAINE HYDROCHLORIDE 5 MG/ML
INJECTION, SOLUTION PERINEURAL PRN
Status: DISCONTINUED | OUTPATIENT
Start: 2025-08-21 | End: 2025-08-21 | Stop reason: ALTCHOICE

## 2025-08-21 RX ORDER — PROPOFOL 10 MG/ML
INJECTION, EMULSION INTRAVENOUS
Status: DISCONTINUED | OUTPATIENT
Start: 2025-08-21 | End: 2025-08-21 | Stop reason: SDUPTHER

## 2025-08-21 RX ORDER — ONDANSETRON 2 MG/ML
4 INJECTION INTRAMUSCULAR; INTRAVENOUS EVERY 6 HOURS PRN
Status: CANCELLED | OUTPATIENT
Start: 2025-08-21

## 2025-08-21 RX ORDER — MIDAZOLAM HYDROCHLORIDE 1 MG/ML
INJECTION, SOLUTION INTRAMUSCULAR; INTRAVENOUS
Status: DISCONTINUED | OUTPATIENT
Start: 2025-08-21 | End: 2025-08-21 | Stop reason: SDUPTHER

## 2025-08-21 RX ORDER — HYDROCODONE BITARTRATE AND ACETAMINOPHEN 5; 325 MG/1; MG/1
2 TABLET ORAL EVERY 4 HOURS PRN
Status: CANCELLED | OUTPATIENT
Start: 2025-08-21

## 2025-08-21 RX ORDER — HYDROCODONE BITARTRATE AND ACETAMINOPHEN 5; 325 MG/1; MG/1
1 TABLET ORAL ONCE
Refills: 0 | Status: COMPLETED | OUTPATIENT
Start: 2025-08-21 | End: 2025-08-21

## 2025-08-21 RX ORDER — SODIUM CHLORIDE 0.9 % (FLUSH) 0.9 %
5-40 SYRINGE (ML) INJECTION EVERY 12 HOURS SCHEDULED
Status: CANCELLED | OUTPATIENT
Start: 2025-08-21

## 2025-08-21 RX ORDER — ROCURONIUM BROMIDE 10 MG/ML
INJECTION, SOLUTION INTRAVENOUS
Status: DISCONTINUED | OUTPATIENT
Start: 2025-08-21 | End: 2025-08-21 | Stop reason: SDUPTHER

## 2025-08-21 RX ORDER — HYDROCODONE BITARTRATE AND ACETAMINOPHEN 5; 325 MG/1; MG/1
1 TABLET ORAL EVERY 4 HOURS PRN
Qty: 28 TABLET | Refills: 0 | Status: SHIPPED | OUTPATIENT
Start: 2025-08-21 | End: 2025-08-28

## 2025-08-21 RX ORDER — TRAMADOL HYDROCHLORIDE 50 MG/1
50 TABLET ORAL EVERY 6 HOURS PRN
Qty: 90 TABLET | Refills: 4
Start: 2025-08-21 | End: 2026-01-18

## 2025-08-21 RX ORDER — HYDROCODONE BITARTRATE AND ACETAMINOPHEN 5; 325 MG/1; MG/1
1 TABLET ORAL EVERY 4 HOURS PRN
Status: CANCELLED | OUTPATIENT
Start: 2025-08-21

## 2025-08-21 RX ADMIN — PROPOFOL 150 MG: 10 INJECTION, EMULSION INTRAVENOUS at 07:32

## 2025-08-21 RX ADMIN — Medication 120 MG: at 07:32

## 2025-08-21 RX ADMIN — Medication 100 MCG: at 07:54

## 2025-08-21 RX ADMIN — ROCURONIUM BROMIDE 5 MG: 10 INJECTION, SOLUTION INTRAVENOUS at 07:32

## 2025-08-21 RX ADMIN — PROPOFOL 50 MG: 10 INJECTION, EMULSION INTRAVENOUS at 08:16

## 2025-08-21 RX ADMIN — FENTANYL CITRATE 50 MCG: 50 INJECTION, SOLUTION INTRAMUSCULAR; INTRAVENOUS at 08:28

## 2025-08-21 RX ADMIN — SODIUM CHLORIDE: 0.9 INJECTION, SOLUTION INTRAVENOUS at 07:23

## 2025-08-21 RX ADMIN — FENTANYL CITRATE 50 MCG: 50 INJECTION, SOLUTION INTRAMUSCULAR; INTRAVENOUS at 07:59

## 2025-08-21 RX ADMIN — HYDROCODONE BITARTRATE AND ACETAMINOPHEN 1 TABLET: 5; 325 TABLET ORAL at 09:28

## 2025-08-21 RX ADMIN — ONDANSETRON 4 MG: 2 INJECTION, SOLUTION INTRAMUSCULAR; INTRAVENOUS at 08:11

## 2025-08-21 RX ADMIN — SUGAMMADEX 200 MG: 100 INJECTION, SOLUTION INTRAVENOUS at 08:21

## 2025-08-21 RX ADMIN — Medication 100 MG: at 07:32

## 2025-08-21 RX ADMIN — MIDAZOLAM 2 MG: 1 INJECTION INTRAMUSCULAR; INTRAVENOUS at 07:30

## 2025-08-21 RX ADMIN — FENTANYL CITRATE 100 MCG: 50 INJECTION, SOLUTION INTRAMUSCULAR; INTRAVENOUS at 07:32

## 2025-08-21 RX ADMIN — ROCURONIUM BROMIDE 45 MG: 10 INJECTION, SOLUTION INTRAVENOUS at 07:40

## 2025-08-21 RX ADMIN — DEXAMETHASONE SODIUM PHOSPHATE 10 MG: 10 INJECTION, EMULSION INTRAMUSCULAR; INTRAVENOUS at 07:32

## 2025-08-21 RX ADMIN — WATER 2000 MG: 1 INJECTION, SOLUTION INTRAVENOUS at 07:45

## 2025-08-21 RX ADMIN — Medication 100 MCG: at 07:51

## 2025-08-21 ASSESSMENT — PAIN DESCRIPTION - LOCATION
LOCATION: ABDOMEN
LOCATION: ABDOMEN

## 2025-08-21 ASSESSMENT — PAIN DESCRIPTION - ORIENTATION: ORIENTATION: LEFT;MID

## 2025-08-21 ASSESSMENT — PAIN SCALES - GENERAL
PAINLEVEL_OUTOF10: 5
PAINLEVEL_OUTOF10: 6

## 2025-08-21 ASSESSMENT — PAIN - FUNCTIONAL ASSESSMENT
PAIN_FUNCTIONAL_ASSESSMENT: ADULT NONVERBAL PAIN SCALE (NPVS)
PAIN_FUNCTIONAL_ASSESSMENT: ACTIVITIES ARE NOT PREVENTED
PAIN_FUNCTIONAL_ASSESSMENT: ACTIVITIES ARE NOT PREVENTED
PAIN_FUNCTIONAL_ASSESSMENT: 0-10

## 2025-08-21 ASSESSMENT — PAIN DESCRIPTION - DESCRIPTORS
DESCRIPTORS: CRAMPING
DESCRIPTORS: CRAMPING
DESCRIPTORS: ACHING

## 2025-08-22 ENCOUNTER — TELEPHONE (OUTPATIENT)
Dept: SURGERY | Age: 65
End: 2025-08-22

## (undated) DEVICE — GOWN,SIRUS,NONRNF,SETINSLV,XL,20/CS: Brand: MEDLINE

## (undated) DEVICE — PENCIL SMK EVAC ALL IN 1 DSGN ENH VISIBILITY IMPROVED AIR

## (undated) DEVICE — SUTURE VICRYL SZ 4-0 L27IN ABSRB UD L17MM RB-1 1/2 CIR J214H

## (undated) DEVICE — GLOVE ORANGE PI 7 1/2   MSG9075

## (undated) DEVICE — SUTURE CHROMIC GUT SZ 3-0 L27IN ABSRB BRN L17MM RB-1 1/2 U204H

## (undated) DEVICE — GOWN SURG L L43IN BLU SMS NONREINFORCED W/ SET IN SLV AAMI

## (undated) DEVICE — GLOVE ORANGE PI 7   MSG9070

## (undated) DEVICE — SUTURE VICRYL + SZ 2-0 L27IN ABSRB WHT SH 1/2 CIR TAPERCUT VCP417H

## (undated) DEVICE — SEAL UNIVERSAL 5-12MM

## (undated) DEVICE — UROLOGY MINOR: Brand: MEDLINE INDUSTRIES, INC.

## (undated) DEVICE — ADHESIVE SKIN CLOSURE TOP 0.8 CC PREM PUR LIQUIBAND RAPID LF

## (undated) DEVICE — DRAPE ARM W21XH19XL10.5IN DA VINCI XI INTUITIVE SURGICAL

## (undated) DEVICE — TUBING INSUFFLATOR HEAT HUMIDIFIED SMK EVAC SET PNEUMOCLEAR

## (undated) DEVICE — DRAPE EQUIP CLMN ROBOTIC DISP DA VINCI XI

## (undated) DEVICE — SYRINGE CATH TIP 50ML

## (undated) DEVICE — COVER TIP DIA8MM DA VINCI SI XI X ENDOWRIST

## (undated) DEVICE — GOWN,SIRUS,NON REINFRCD,LARGE,SET IN SL: Brand: MEDLINE

## (undated) DEVICE — TRAY BSIN SGL PIGMENT FREE RNG W/ W50XL50IN TBL CVR

## (undated) DEVICE — TOWEL,OR,DSP,ST,BLUE,STD,4/PK,20PK/CS: Brand: MEDLINE

## (undated) DEVICE — SUTURE MONOCRYL SZ 4-0 L27IN ABSRB UD L19MM PS-2 1/2 CIR PRIM Y426H

## (undated) DEVICE — GLOVE ORANGE PI 8   MSG9080

## (undated) DEVICE — DRAINBAG,ANTI-REFLUX TOWER,L/F,2000ML,LL: Brand: MEDLINE

## (undated) DEVICE — SUTURE VICRYL + SZ 3-0 L27IN ABSRB UD L26MM SH 1/2 CIR VCP416H

## (undated) DEVICE — TUBING, SUCTION, 1/4" X 20', STRAIGHT: Brand: MEDLINE INDUSTRIES, INC.

## (undated) DEVICE — GAUZE,SPONGE,8"X4",12PLY,XRAY,STRL,LF: Brand: MEDLINE

## (undated) DEVICE — SOLUTION ELECTROCAUTERY 4ML YEL BRN AMPHIPHILIC PHOSLIP

## (undated) DEVICE — NEEDLE INSUF 13GA L120MM

## (undated) DEVICE — BINDER ABD UNISX 4 PNL PREM 6INX6INX12IN L XL 4

## (undated) DEVICE — SOLUTION SCRB 4OZ 4% CHG H2O AIDED FOR PREOPERATIVE SKIN

## (undated) DEVICE — SYRINGE MED 30ML STD CLR PLAS LUERLOCK TIP N CTRL DISP

## (undated) DEVICE — SUTURE V-LOC 180 SZ 3-0 L9IN ABSRB GRN L26MM V-20 1/2 CIR VLOCL0644

## (undated) DEVICE — KIT DETRGNT ENZYMATIC SOLUTION 100 ML SOAK SHLD 5 VI LG HUMD

## (undated) DEVICE — OBTURATOR ROBOTIC DIA8MM STD OPT BLDELSS

## (undated) DEVICE — SUTURE ETHIBOND EXCEL SZ 0 L36IN NONABSORBABLE GRN SH L26MM X524H

## (undated) DEVICE — Device